# Patient Record
Sex: MALE | Race: WHITE | Employment: FULL TIME | URBAN - METROPOLITAN AREA
[De-identification: names, ages, dates, MRNs, and addresses within clinical notes are randomized per-mention and may not be internally consistent; named-entity substitution may affect disease eponyms.]

---

## 2017-03-13 ENCOUNTER — HOSPITAL ENCOUNTER (OUTPATIENT)
Dept: LAB | Age: 57
Discharge: HOME | End: 2017-03-13

## 2017-03-13 ENCOUNTER — TELEPHONE (OUTPATIENT)
Dept: INTERNAL MEDICINE CLINIC | Age: 57
End: 2017-03-13

## 2017-03-13 DIAGNOSIS — E66.3 OVERWEIGHT (BMI 25.0-29.9): ICD-10-CM

## 2017-03-13 DIAGNOSIS — E78.5 DYSLIPIDEMIA: Primary | ICD-10-CM

## 2017-03-13 LAB — SENTARA SPECIMEN COL,SENBCF: NORMAL

## 2017-03-13 PROCEDURE — 99001 SPECIMEN HANDLING PT-LAB: CPT | Performed by: INTERNAL MEDICINE

## 2017-03-13 NOTE — TELEPHONE ENCOUNTER
Pt is getting PE on 03/17-he only has a lipid in CC- does he need more labs done? Call PT when this has been  Done.  He goes to  for labs

## 2017-03-13 NOTE — TELEPHONE ENCOUNTER
Nurse,     Do we need to notify someone  that RD added additional labs today? Pt went off site and had them drawn.

## 2017-03-21 NOTE — PROGRESS NOTES
61 y.o. WHITE OR  male who presents for RPE    He seems to be doing well. No exercise although he does walk quite a bit with his work. No cardiovascular complaints    Denied Gi or  complaints. The sleep apnea is controlled and energy level is ok    The allergies are controlled at this time    Past Medical History:   Diagnosis Date    Allergic rhinitis     Dyslipidemia     calculated 10 year risk score was 6.7%(2/14); 7.7% (3/15); 7.9% (2/15)    Overweight(278.02)     Sleep apnea     Dr. Moustapha Longo [place] 1990s    right T10     Past Surgical History:   Procedure Laterality Date    CARDIAC SURG PROCEDURE UNLIST  11/08    echo nl lv, ef 55%    CARDIAC SURG PROCEDURE UNLIST  2007    thallium negative    HX COLONOSCOPY      Dr Curtis Castano 7/16/10 negative     Social History     Social History    Marital status:      Spouse name: N/A    Number of children: 2    Years of education: N/A     Occupational History    , storage mgr      Social History Main Topics    Smoking status: Never Smoker    Smokeless tobacco: Never Used    Alcohol use No    Drug use: No    Sexual activity: Not on file     Other Topics Concern    Not on file     Social History Narrative     Current Outpatient Prescriptions   Medication Sig    fluticasone (FLONASE) 50 mcg/actuation nasal spray One to two sprays each nostril daily     No current facility-administered medications for this visit.       Allergies   Allergen Reactions    Pcn [Penicillins] Shortness of Breath     REVIEW OF SYSTEMS: colo 2010 Dr Chandler [facility]  Ophtho - no vision change or eye pain  Oral - no mouth pain, tongue or tooth problems  Ears - no hearing loss, ear pain, fullness, no swallowing problems  Cardiac - no CP, PND, orthopnea, edema, palpitations or syncope  Chest - no breast masses  Resp - no wheezing, chronic coughing, dyspnea  GI - no heartburn, nausea, vomiting, change in bowel habits, bleeding, hemorrhoids  Urinary - no dysuria, hematuria, flank pain, urgency, frequency  Psych - denies any anxiety or depression symptoms, no hallucinations or violent ideation  Constitutional - no wt loss, night sweats, unexplained fevers  Neuro - no focal weakness, numbness, paresthesias, incoordination, ataxia, involuntary movements  Endo - no polyuria, polydipsia, nocturia, hot flashes    Visit Vitals    /80    Pulse 62    Temp 98.1 °F (36.7 °C) (Oral)    Ht 6' 0.25\" (1.835 m)    Wt 199 lb (90.3 kg)    SpO2 98%    BMI 26.8 kg/m2     Affect is appropriate. Mood stable  No apparent distress  HEENT --Anicteric sclerae, tympanic membranes normal,  ear canals normal.  PERRL, EOMI, conjunctiva and lids normal.  Disks were sharp  Sinuses were nontender, turbinates normal, hearing normal.  Oropharynx without  erythema, normal tongue, oral mucosa and tonsils. No thyromegaly, JVD, or bruits. Lungs --Clear to auscultation, normal percussion. Heart --Regular rate and rhythm, no murmurs, rubs, gallops, or clicks. Chest wall --Nontender to palpation. PMI normal.  Abdomen -- Soft and nontender, no hepatosplenomegaly or masses.   -- normal penis, no scrotal masses, testicular tenderness or hernias  Prostate  -- no asymmetry, nodularity, tenderness or enlargement  Rectal  -- normal tone, guiaiac negative brown stool  Extremities -- Without cyanosis, clubbing, edema. 2+ pulses equally and bilaterally.     LABS  From 5/10 showed   gluc 97, cr 0.90, gfr>60,          wbc 5.5, hb 12.9, plt 219  From 12/10 showed gluc 89, cr 0.90,    alt 52,         chol 207, tg 104, hdl 41, ldl-c 145, wbc 5.8, hb 13.3, plt 295, psa 0.20  From 12/11 showed gluc 83, cr 0.82, gfr 102, alt 26, ldl-p 2121, chol 196, tg 163, hdl 36, ldl-c 139,  wbc 4.6, hb 13.0, plt 267, psa 0.30, tsh 3.51  From 7/12 showed       ldl-p 1614, chol 180, tg 92,   hdl 35, ldl-c 127  From 12/12 showed       ldl-p 1671, chol 159, tg 67,   hdl 41, ldl-c 105  From 2/14 showed   gluc 90, cr 0.90, gfr>60, alt 38,         chol 188, tg 86,   hdl 37, ldl-c 134, wbc 4.9, hb 13.0, plt 337, psa 0.27, ua neg  From 2/15 showed   gluc 92, cr 0.90, gfr>60, alt 28,         chol 197, tg 127, hdl 38, ldl-c 133, wbc 5.3, hb 12.9, plt 293, psa 0.20, ua neg  From 2/16 showed   gluc 85, cr 0.70, gfr>60, alt 43,         chol 198, tg 121, hdl 39, ldl-c 135, wbc 5.5, hb 13.3, plt 305, psa 0.30, ua neg, ast 44, alk phos 71, hep c neg  From 3/17 showed              chol 175, tg 85,   hdl 41, ldl-c 117    Calculated 10 year risk score was 6.5%    Patient Active Problem List   Diagnosis Code    Sleep apnea Dr. Verena Woods G47.30    Dyslipidemia E78.5    Overweight (BMI 25.0-29. 9) E66.3     ASSESSMENT AND PLAN:  1. Dyslipidemia. Continue lifestyle and dietary modification  2. Sleep apnea. Continue CPAP and f/u Dr. Verena Woods   3. His forms were filled out        RTC 3/18    Above conditions discussed at length and patient vocalized understanding.   All questions answered to patient satifaction

## 2017-03-24 ENCOUNTER — OFFICE VISIT (OUTPATIENT)
Dept: INTERNAL MEDICINE CLINIC | Age: 57
End: 2017-03-24

## 2017-03-24 VITALS
OXYGEN SATURATION: 98 % | WEIGHT: 199 LBS | SYSTOLIC BLOOD PRESSURE: 124 MMHG | BODY MASS INDEX: 26.95 KG/M2 | TEMPERATURE: 98.1 F | HEART RATE: 62 BPM | HEIGHT: 72 IN | DIASTOLIC BLOOD PRESSURE: 80 MMHG

## 2017-03-24 DIAGNOSIS — Z00.00 PHYSICAL EXAM: Primary | ICD-10-CM

## 2017-03-24 DIAGNOSIS — E66.3 OVERWEIGHT (BMI 25.0-29.9): ICD-10-CM

## 2017-03-24 DIAGNOSIS — E78.5 DYSLIPIDEMIA: ICD-10-CM

## 2017-03-24 DIAGNOSIS — G47.33 OBSTRUCTIVE SLEEP APNEA SYNDROME: ICD-10-CM

## 2017-03-24 NOTE — MR AVS SNAPSHOT
Visit Information Date & Time Provider Department Dept. Phone Encounter #  
 3/24/2017  1:00 PM Keyonna Ramires MD [facility]ist of [address] [id number] Upcoming Health Maintenance Date Due FOBT Q 1 YEAR AGE 50-75 7/23/2009 DTaP/Tdap/Td series (1 - Tdap) 11/13/2009 INFLUENZA AGE 9 TO ADULT 8/1/2016 Allergies as of 3/24/2017  Review Complete On: 3/24/2017 By: Keyonna Ramires MD  
  
 Severity Noted Reaction Type Reactions Pcn [Penicillins]  01/05/2012    Shortness of Breath Current Immunizations  Reviewed on 2/21/2016 Name Date Hepatitis B Vaccine 1/1/2001 Influenza Vaccine 1/9/2013 Influenza Vaccine Split 1/5/2012 Influenza Vaccine Whole 11/25/2009 PPD 1/7/2013, 11/25/2009 TB Skin Test (PPD) Intradermal 3/16/2015 10:23 AM, 2/24/2015, 2/12/2014 TD Vaccine 11/12/2009, 1/1/2004 Not reviewed this visit Vitals BP Pulse Temp Height(growth percentile) Weight(growth percentile) SpO2  
 124/80 62 98.1 °F (36.7 °C) (Oral) 6' 0.25\" (1.835 m) 199 lb (90.3 kg) 98% BMI Smoking Status 26.8 kg/m2 Never Smoker Vitals History BMI and BSA Data Body Mass Index Body Surface Area  
 26.8 kg/m 2 2.15 m 2 Your Updated Medication List  
  
   
This list is accurate as of: 3/24/17  1:47 PM.  Always use your most recent med list.  
  
  
  
  
 fluticasone 50 mcg/actuation nasal spray Commonly known as:  Lennice Boss One to two sprays each nostril daily Introducing [facility]! [address] introduces [employer] patient portal. Now you can access parts of your medical record, email your doctor's office, and request medication refills online. 1. In your internet browser, go to [email or web address] 2. Click on the First Time User? Click Here link in the Sign In box. You will see the New Member Sign Up page. 3. Enter your [employer] Access Code exactly as it appears below. You will not need to use this code after youve completed the sign-up process. If you do not sign up before the expiration date, you must request a new code. · [employer] Access Code: [email or web address] Expires: 6/9/2017  8:32 AM 
 
4. Enter the last four digits of your Social Security Number (xxxx) and Date of Birth (mm/dd/yyyy) as indicated and click Submit. You will be taken to the next sign-up page. 5. Create a [employer] ID. This will be your [employer] login ID and cannot be changed, so think of one that is secure and easy to remember. 6. Create a [employer] password. You can change your password at any time. 7. Enter your Password Reset Question and Answer. This can be used at a later time if you forget your password. 8. Enter your e-mail address. You will receive e-mail notification when new information is available in [address] 9. Click Sign Up. You can now view and download portions of your medical record. 10. Click the Download Summary menu link to download a portable copy of your medical information. If you have questions, please visit the Frequently Asked Questions section of the [employer] website. Remember, [employer] is NOT to be used for urgent needs. For medical emergencies, dial 911. Now available from your iPhone and Android! Please provide this summary of care documentation to your next provider. Your primary care clinician is listed as Twan Hilton. If you have any questions after today's visit, please call [phone].

## 2017-03-24 NOTE — PROGRESS NOTES
1. Have you been to the ER, urgent care clinic or hospitalized since your last visit? NO.     2. Have you seen or consulted any other health care providers outside of the [address] [place] [address] since your last visit (Include any pap smears or colon screening)? NO      Do you have an Advanced Directive? NO    Would you like information on Advanced Directives?  YES

## 2017-03-27 ENCOUNTER — DOCUMENTATION ONLY (OUTPATIENT)
Dept: INTERNAL MEDICINE CLINIC | Age: 57
End: 2017-03-27

## 2017-09-21 DIAGNOSIS — Z00.00 PHYSICAL EXAM: ICD-10-CM

## 2018-01-26 ENCOUNTER — TELEPHONE (OUTPATIENT)
Dept: INTERNAL MEDICINE CLINIC | Age: 58
End: 2018-01-26

## 2018-01-26 DIAGNOSIS — Z00.00 ROUTINE GENERAL MEDICAL EXAMINATION AT A HEALTH CARE FACILITY: ICD-10-CM

## 2018-01-26 DIAGNOSIS — E78.5 DYSLIPIDEMIA: ICD-10-CM

## 2018-01-26 DIAGNOSIS — Z00.00 ROUTINE GENERAL MEDICAL EXAMINATION AT A HEALTH CARE FACILITY: Primary | ICD-10-CM

## 2018-03-27 ENCOUNTER — HOSPITAL ENCOUNTER (OUTPATIENT)
Dept: LAB | Age: 58
Discharge: HOME | End: 2018-03-27

## 2018-03-27 LAB
A-G RATIO,AGRAT: 1.8 RATIO (ref 1.1–2.6)
ABSOLUTE LYMPHOCYTE COUNT, 10803: 1.5 K/UL (ref 1–4.8)
ALBUMIN SERPL-MCNC: 4.5 G/DL (ref 3.5–5)
ALP SERPL-CCNC: 64 U/L (ref 25–115)
ALT SERPL-CCNC: 34 U/L (ref 5–40)
ANION GAP SERPL CALC-SCNC: 15 MMOL/L
AST SERPL W P-5'-P-CCNC: 36 U/L (ref 10–37)
BASOPHILS # BLD: 0.1 K/UL (ref 0–0.2)
BASOPHILS NFR BLD: 2 % (ref 0–2)
BILIRUB SERPL-MCNC: 0.4 MG/DL (ref 0.2–1.2)
BUN SERPL-MCNC: 13 MG/DL (ref 6–22)
CALCIUM SERPL-MCNC: 9.6 MG/DL (ref 8.4–10.4)
CHLORIDE SERPL-SCNC: 103 MMOL/L (ref 98–110)
CHOLEST SERPL-MCNC: 202 MG/DL (ref 110–200)
CO2 SERPL-SCNC: 26 MMOL/L (ref 20–32)
CREAT SERPL-MCNC: 0.8 MG/DL (ref 0.5–1.2)
EOSINOPHIL # BLD: 0.3 K/UL (ref 0–0.5)
EOSINOPHIL NFR BLD: 5 % (ref 0–6)
ERYTHROCYTE [DISTWIDTH] IN BLOOD BY AUTOMATED COUNT: 12.8 % (ref 10–15.5)
GFRAA, 66117: >60
GFRNA, 66118: >60
GLOBULIN,GLOB: 2.5 G/DL (ref 2–4)
GLUCOSE SERPL-MCNC: 93 MG/DL (ref 70–99)
GRANULOCYTES,GRANS: 54 % (ref 40–75)
HCT VFR BLD AUTO: 38.9 % (ref 39.3–51.6)
HDLC SERPL-MCNC: 37 MG/DL (ref 40–59)
HDLC SERPL-MCNC: 5.5 MG/DL (ref 0–5)
HGB BLD-MCNC: 12.7 G/DL (ref 13.1–17.2)
LDLC SERPL CALC-MCNC: 143 MG/DL (ref 50–99)
LYMPHOCYTES, LYMLT: 28 % (ref 20–45)
MCH RBC QN AUTO: 31 PG (ref 26–34)
MCHC RBC AUTO-ENTMCNC: 33 G/DL (ref 31–36)
MCV RBC AUTO: 95 FL (ref 80–95)
MONOCYTES # BLD: 0.6 K/UL (ref 0.1–1)
MONOCYTES NFR BLD: 11 % (ref 3–12)
NEUTROPHILS # BLD AUTO: 2.9 K/UL (ref 1.8–7.7)
PLATELET # BLD AUTO: 290 K/UL (ref 140–440)
PMV BLD AUTO: 10.8 FL (ref 9–13)
POTASSIUM SERPL-SCNC: 4.5 MMOL/L (ref 3.5–5.5)
PROT SERPL-MCNC: 7 G/DL (ref 6.4–8.3)
RBC # BLD AUTO: 4.09 M/UL (ref 3.8–5.8)
SENTARA SPECIMEN COL,SENBCF: NORMAL
SODIUM SERPL-SCNC: 144 MMOL/L (ref 133–145)
TRIGL SERPL-MCNC: 113 MG/DL (ref 40–149)
TSH SERPL DL<=0.005 MIU/L-ACNC: 3 MCU/ML (ref 0.27–4.2)
VLDLC SERPL CALC-MCNC: 23 MG/DL (ref 8–30)
WBC # BLD AUTO: 5.3 K/UL (ref 4–11)

## 2018-03-27 PROCEDURE — 99001 SPECIMEN HANDLING PT-LAB: CPT | Performed by: INTERNAL MEDICINE

## 2018-03-29 NOTE — PROGRESS NOTES
61 y.o. WHITE OR  male who presents for RPE    No exercise outside of walking for his work. No cardiovascular complaints    Denied Gi or  complaints. The sleep apnea is controlled and energy level is ok    The allergies are controlled at this time    Past Medical History:   Diagnosis Date    Allergic rhinitis     Dyslipidemia     calculated 10 year risk score was 6.7%(2/14); 7.7% (3/15); 7.9% (2/15); 6.3% (3/17); 9.3% (4/18)    Overweight(278.02)     Sleep apnea     Dr. Ericka Wan Zoster 1990s    right T10     Past Surgical History:   Procedure Laterality Date    CARDIAC SURG PROCEDURE UNLIST  11/08    echo nl lv, ef 55%    CARDIAC SURG PROCEDURE UNLIST  2007    thallium negative    HX COLONOSCOPY      Dr Alisson Abernathy 7/16/10 negative     Social History     Social History    Marital status:      Spouse name: N/A    Number of children: 2    Years of education: N/A     Occupational History          Social History Main Topics    Smoking status: Never Smoker    Smokeless tobacco: Never Used    Alcohol use No    Drug use: No    Sexual activity: Not on file     Other Topics Concern    Not on file     Social History Narrative     Current Outpatient Prescriptions   Medication Sig    fluticasone (FLONASE) 50 mcg/actuation nasal spray One to two sprays each nostril daily     No current facility-administered medications for this visit.       Allergies   Allergen Reactions    Pcn [Penicillins] Shortness of Breath     REVIEW OF SYSTEMS: colo 7/10 Dr Alonzo Cockayne  Ophtho  no vision change or eye pain  Oral  no mouth pain, tongue or tooth problems  Ears  no hearing loss, ear pain, fullness, no swallowing problems  Cardiac  no CP, PND, orthopnea, edema, palpitations or syncope  Chest  no breast masses  Resp  no wheezing, chronic coughing, dyspnea  GI  no heartburn, nausea, vomiting, change in bowel habits, bleeding, hemorrhoids  Urinary  no dysuria, hematuria, flank pain, urgency, frequency  Psych  denies any anxiety or depression symptoms, no hallucinations or violent ideation  Constitutional  no wt loss, night sweats, unexplained fevers  Neuro  no focal weakness, numbness, paresthesias, incoordination, ataxia, involuntary movements  Endo - no polyuria, polydipsia, nocturia, hot flashes    Visit Vitals    /84    Pulse 65    Temp 98.1 °F (36.7 °C) (Oral)    Resp 14    Ht 6' 0.25\" (1.835 m)    Wt 207 lb (93.9 kg)    SpO2 98%    BMI 27.88 kg/m2     Affect is appropriate. Mood stable  No apparent distress  HEENT --Anicteric sclerae, tympanic membranes normal,  ear canals normal.  PERRL, EOMI, conjunctiva and lids normal.  Disks were sharp  Sinuses were nontender, turbinates normal, hearing normal.  Oropharynx without  erythema, normal tongue, oral mucosa and tonsils. No thyromegaly, JVD, or bruits. Lungs --Clear to auscultation, normal percussion. Heart --Regular rate and rhythm, no murmurs, rubs, gallops, or clicks. Chest wall --Nontender to palpation. PMI normal.  Abdomen -- Soft and nontender, no hepatosplenomegaly or masses.   -- normal penis, no scrotal masses, testicular tenderness or hernias  Prostate  -- no asymmetry, nodularity, tenderness or enlargement  Rectal  -- normal tone, guiaiac negative brown stool  Extremities -- Without cyanosis, clubbing, edema. 2+ pulses equally and bilaterally.     LABS  From 5/10 showed   gluc 97, cr 0.90, gfr>60,           wbc 5.5, hb 12.9, plt 219  From 12/10 showed gluc 89, cr 0.90,    alt 52,         chol 207, tg 104, hdl 41, ldl-c 145, wbc 5.8, hb 13.3, plt 295, psa 0.20  From 12/11 showed gluc 83, cr 0.82, gfr 102, alt 26, ldl-p 2121, chol 196, tg 163, hdl 36, ldl-c 139,  wbc 4.6, hb 13.0, plt 267, psa 0.30, tsh 3.51  From 7/12 showed       ldl-p 1614, chol 180, tg 92,   hdl 35, ldl-c 127  From 12/12 showed       ldl-p 1671, chol 159, tg 67,   hdl 41, ldl-c 105  From 2/14 showed   gluc 90, cr 0.90, gfr>60, alt 38, chol 188, tg 86,   hdl 37, ldl-c 134, wbc 4.9, hb 13.0, plt 337, psa 0.27, ua neg  From 2/15 showed   gluc 92, cr 0.90, gfr>60, alt 28,         chol 197, tg 127, hdl 38, ldl-c 133, wbc 5.3, hb 12.9, plt 293, psa 0.20, ua neg  From 2/16 showed   gluc 85, cr 0.70, gfr>60, alt 43,         chol 198, tg 121, hdl 39, ldl-c 135, wbc 5.5, hb 13.3, plt 305, psa 0.30, ua neg, ast 44, alk phos 71, hep c neg  From 3/17 showed              chol 175, tg 85,   hdl 41, ldl-c 117    Results for orders placed or performed in visit on 03/27/18   LIPID PANEL   Result Value Ref Range    Triglyceride 113 40 - 149 mg/dL    HDL Cholesterol 37 (L) 40 - 59 mg/dL    Cholesterol, total 202 (H) 110 - 200 mg/dL    CHOLESTEROL/HDL 5.5 0.0 - 5.0    LDL, calculated 143 (H) 50 - 99 mg/dL    VLDL, calculated 23 8 - 30 mg/dL   METABOLIC PANEL, COMPREHENSIVE   Result Value Ref Range    Glucose 93 70 - 99 mg/dL    BUN 13 6 - 22 mg/dL    Creatinine 0.8 0.5 - 1.2 mg/dL    Sodium 144 133 - 145 mmol/L    Potassium 4.5 3.5 - 5.5 mmol/L    Chloride 103 98 - 110 mmol/L    CO2 26 20 - 32 mmol/L    AST (SGOT) 36 10 - 37 U/L    ALT (SGPT) 34 5 - 40 U/L    Alk.  phosphatase 64 25 - 115 U/L    Bilirubin, total 0.4 0.2 - 1.2 mg/dL    Calcium 9.6 8.4 - 10.4 mg/dL    Protein, total 7.0 6.4 - 8.3 g/dL    Albumin 4.5 3.5 - 5.0 g/dL    A-G Ratio 1.8 1.1 - 2.6 ratio    Globulin 2.5 2.0 - 4.0 g/dL    Anion gap 15.0 mmol/L    GFRAA >60.0 >60.0    GFRNA >60.0 >60.0   TSH 3RD GENERATION   Result Value Ref Range    TSH 3.00 0.27 - 4.20 mcU/mL   CBC WITH AUTOMATED DIFF   Result Value Ref Range    WBC 5.3 4.0 - 11.0 K/uL    RBC 4.09 3.80 - 5.80 M/uL    HGB 12.7 (L) 13.1 - 17.2 g/dL    HCT 38.9 (L) 39.3 - 51.6 %    MCV 95 80 - 95 fL    MCH 31 26 - 34 pg    MCHC 33 31 - 36 g/dL    RDW 12.8 10.0 - 15.5 %    PLATELET 785 549 - 194 K/uL    MPV 10.8 9.0 - 13.0 fL    NEUTROPHILS 54 40 - 75 %    Lymphocytes 28 20 - 45 %    MONOCYTES 11 3 - 12 %    EOSINOPHILS 5 0 - 6 %    BASOPHILS 2 0 - 2 %    ABS. NEUTROPHILS 2.9 1.8 - 7.7 K/uL    ABSOLUTE LYMPHOCYTE COUNT 1.5 1.0 - 4.8 K/uL    ABS. MONOCYTES 0.6 0.1 - 1.0 K/uL    ABS. EOSINOPHILS 0.3 0.0 - 0.5 K/uL    ABS. BASOPHILS 0.1 0.0 - 0.2 K/uL     Calculated 10 year risk score was 9.3%    Patient Active Problem List   Diagnosis Code    Sleep apnea Dr. Katty Drake G47.30    Dyslipidemia E78.5    Overweight (BMI 25.0-29. 9) E66.3     ASSESSMENT AND PLAN:  1. Dyslipidemia. Long discussion. Declined statin or further risk stratification w ca score. Risks and benefits of statins discussed at length, call if he changes his mind  2. Sleep apnea. Continue CPAP and f/u Dr. Katty Drake   3. His forms were filled out  4. Mild anemia. Declined eval for now but will recheck in 6 mos and go from there        RTC 10/18    Above conditions discussed at length and patient vocalized understanding.   All questions answered to patient satifaction

## 2018-04-02 ENCOUNTER — OFFICE VISIT (OUTPATIENT)
Dept: INTERNAL MEDICINE CLINIC | Age: 58
End: 2018-04-02

## 2018-04-02 VITALS
OXYGEN SATURATION: 98 % | SYSTOLIC BLOOD PRESSURE: 126 MMHG | DIASTOLIC BLOOD PRESSURE: 84 MMHG | RESPIRATION RATE: 14 BRPM | HEART RATE: 65 BPM | WEIGHT: 207 LBS | HEIGHT: 72 IN | BODY MASS INDEX: 28.04 KG/M2 | TEMPERATURE: 98.1 F

## 2018-04-02 DIAGNOSIS — D64.9 ANEMIA, UNSPECIFIED TYPE: ICD-10-CM

## 2018-04-02 DIAGNOSIS — G47.33 OBSTRUCTIVE SLEEP APNEA SYNDROME: ICD-10-CM

## 2018-04-02 DIAGNOSIS — E78.5 DYSLIPIDEMIA: ICD-10-CM

## 2018-04-02 DIAGNOSIS — Z00.00 PHYSICAL EXAM: Primary | ICD-10-CM

## 2018-04-02 DIAGNOSIS — E66.3 OVERWEIGHT (BMI 25.0-29.9): ICD-10-CM

## 2018-04-02 NOTE — PROGRESS NOTES
1. Have you been to the ER, urgent care clinic or hospitalized since your last visit? NO.     2. Have you seen or consulted any other health care providers outside of the [address] [place] [address] since your last visit (Include any pap smears or colon screening)? NO      Do you have an Advanced Directive? NO    Would you like information on Advanced Directives?  NO

## 2018-04-03 ENCOUNTER — TELEPHONE (OUTPATIENT)
Dept: INTERNAL MEDICINE CLINIC | Age: 58
End: 2018-04-03

## 2018-09-30 DIAGNOSIS — D64.9 ANEMIA, UNSPECIFIED TYPE: ICD-10-CM

## 2019-04-08 ENCOUNTER — HOSPITAL ENCOUNTER (OUTPATIENT)
Dept: LAB | Age: 59
Discharge: HOME | End: 2019-04-08

## 2019-04-08 PROCEDURE — 99001 SPECIMEN HANDLING PT-LAB: CPT

## 2019-04-09 LAB
ERYTHROCYTE [DISTWIDTH] IN BLOOD BY AUTOMATED COUNT: 13 % (ref 10–15.5)
HCT VFR BLD AUTO: 40.1 % (ref 39.3–51.6)
HGB BLD-MCNC: 12.4 G/DL (ref 13.1–17.2)
MCH RBC QN AUTO: 31 PG (ref 26–34)
MCHC RBC AUTO-ENTMCNC: 31 G/DL (ref 31–36)
MCV RBC AUTO: 100 FL (ref 80–95)
PLATELET # BLD AUTO: 260 K/UL (ref 140–440)
PMV BLD AUTO: 11.1 FL (ref 9–13)
PSA SERPL-MCNC: 0.19 NG/ML
RBC # BLD AUTO: 4.02 M/UL (ref 3.8–5.8)
WBC # BLD AUTO: 4.6 K/UL (ref 4–11)

## 2019-04-15 NOTE — PROGRESS NOTES
60 y.o. WHITE OR  male who presents for RPE No exercise outside of walking for his work. Walks the grandsons a few times a week also  No cardiovascular complaints Denied Gi or  complaints. Specifically, no bleeding episodes The sleep apnea is controlled and energy level is good The allergies are controlled at this time Past Medical History:  
Diagnosis Date  Allergic rhinitis  Dyslipidemia   
 calculated 10 year risk score was 6.7%(2/14); 7.7% (3/15); 7.9% (2/15); 6.3% (3/17); 9.3% (4/18)  Overweight(278.02)  Sleep apnea Dr. Esquivel Mac  Zoster 1990s  
 right T10 Past Surgical History:  
Procedure Laterality Date  CARDIAC SURG PROCEDURE UNLIST  11/08  
 echo nl lv, ef 55%  CARDIAC SURG PROCEDURE UNLIST  2007  
 thallium negative  HX COLONOSCOPY Dr Jozef Hameed 7/16/10 negative Social History Socioeconomic History  Marital status:  Spouse name: Not on file  Number of children: 2  
 Years of education: Not on file  Highest education level: Not on file Occupational History  Occupation:  Social Needs  Financial resource strain: Not on file  Food insecurity:  
  Worry: Not on file Inability: Not on file  Transportation needs:  
  Medical: Not on file Non-medical: Not on file Tobacco Use  Smoking status: Never Smoker  Smokeless tobacco: Never Used Substance and Sexual Activity  Alcohol use: No  
 Drug use: No  
 Sexual activity: Not on file Lifestyle  Physical activity:  
  Days per week: Not on file Minutes per session: Not on file  Stress: Not on file Relationships  Social connections:  
  Talks on phone: Not on file Gets together: Not on file Attends Buddhist service: Not on file Active member of club or organization: Not on file Attends meetings of clubs or organizations: Not on file Relationship status: Not on file  Intimate partner violence: Fear of current or ex partner: Not on file Emotionally abused: Not on file Physically abused: Not on file Forced sexual activity: Not on file Other Topics Concern  Not on file Social History Narrative  Not on file Current Outpatient Medications Medication Sig  
 fluticasone (FLONASE) 50 mcg/actuation nasal spray One to two sprays each nostril daily No current facility-administered medications for this visit. Allergies Allergen Reactions  Pcn [Penicillins] Shortness of Breath REVIEW OF SYSTEMS: colo 7/10 Dr Chandrakant Nicolas Ophtho  no vision change or eye pain Oral  no mouth pain, tongue or tooth problems Ears  no hearing loss, ear pain, fullness, no swallowing problems Cardiac  no CP, PND, orthopnea, edema, palpitations or syncope Chest  no breast masses Resp  no wheezing, chronic coughing, dyspnea GI  no heartburn, nausea, vomiting, change in bowel habits, bleeding, hemorrhoids Urinary  no dysuria, hematuria, flank pain, urgency, frequency Psych  denies any anxiety or depression symptoms, no hallucinations or violent ideation Constitutional  no wt loss, night sweats, unexplained fevers Neuro  no focal weakness, numbness, paresthesias, incoordination, ataxia, involuntary movements Endo - no polyuria, polydipsia, nocturia, hot flashes Visit Vitals /85 Pulse 73 Temp 98.2 °F (36.8 °C) (Oral) Resp 14 Ht 6' 0.25\" (1.835 m) Wt 201 lb (91.2 kg) SpO2 99% BMI 27.07 kg/m² Affect is appropriate. Mood stable No apparent distress HEENT --Anicteric sclerae, tympanic membranes normal,  ear canals normal. 
PERRL, EOMI, conjunctiva and lids normal.  Disks were sharp Sinuses were nontender, turbinates normal, hearing normal.  Oropharynx without 
erythema, normal tongue, oral mucosa and tonsils. No thyromegaly, JVD, or bruits. Lungs --Clear to auscultation, normal percussion. Heart --Regular rate and rhythm, no murmurs, rubs, gallops, or clicks. Chest wall --Nontender to palpation. PMI normal. 
Abdomen -- Soft and nontender, no hepatosplenomegaly or masses.   -- normal penis, no scrotal masses, testicular tenderness or hernias Prostate  -- no asymmetry, nodularity, tenderness or enlargement Rectal  -- normal tone, guiaiac negative brown stool Extremities -- Without cyanosis, clubbing, edema. 2+ pulses equally and bilaterally. LABS From 5/10 showed   gluc 97, cr 0.90, gfr>60,           wbc 5.5, hb 12.9, plt 219 From 12/10 showed gluc 89, cr 0.90,    alt 52,         chol 207, tg 104, hdl 41, ldl-c 145, wbc 5.8, hb 13.3, plt 295, psa 0.20 From 12/11 showed gluc 83, cr 0.82, gfr 102, alt 26, ldl-p 2121, chol 196, tg 163, hdl 36, ldl-c 139,  wbc 4.6, hb 13.0, plt 267, psa 0.30, tsh 3.51 From 7/12 showed       ldl-p 1614, chol 180, tg 92,   hdl 35, ldl-c 127 From 12/12 showed       ldl-p 1671, chol 159, tg 67,   hdl 41, ldl-c 105 From 2/14 showed   gluc 90, cr 0.90, gfr>60, alt 38,         chol 188, tg 86,   hdl 37, ldl-c 134, wbc 4.9, hb 13.0, plt 337, psa 0.27, ua neg From 2/15 showed   gluc 92, cr 0.90, gfr>60, alt 28,         chol 197, tg 127, hdl 38, ldl-c 133, wbc 5.3, hb 12.9, plt 293, psa 0.20, ua neg From 2/16 showed   gluc 85, cr 0.70, gfr>60, alt 43,         chol 198, tg 121, hdl 39, ldl-c 135, wbc 5.5, hb 13.3, plt 305, psa 0.30, ua neg, ast 44, alk phos 71, hep c neg From 3/17 showed              chol 175, tg 85,   hdl 41, ldl-c 117 From 4/18 showed   gluc 93, cr 0.80, gfr>60, alt 34,         chol 202, tg 113, hdl 37, ldl-c 143, wbc 5.3, hb 12.7, plt 290, tsh 3.00 From 4/19 showed               wbc 4.4, hb 12.4, plt 268, psa 0.19 Results for orders placed or performed in visit on 04/16/19 AMB POC FECAL BLOOD, OCCULT, QL 1 CARD Result Value Ref Range VALID INTERNAL CONTROL POC Yes Hemoccult (POC) Negative Negative Patient Active Problem List  
Diagnosis Code  Sleep apnea Dr. Hilary Grider G47.30  Dyslipidemia E78.5  Overweight (BMI 25.0-29. 9) E66.3 ASSESSMENT AND PLAN: 
1. Dyslipidemia. Check labs, previously declined statin 2. Sleep apnea. Continue CPAP and f/u Dr. Hilary Grider  
3. His forms were filled out 4. Mild anemia. Proceed with workup RTC 4/20 Above conditions discussed at length and patient vocalized understanding. All questions answered to patient satisfaction

## 2019-04-16 ENCOUNTER — OFFICE VISIT (OUTPATIENT)
Dept: INTERNAL MEDICINE CLINIC | Age: 59
End: 2019-04-16

## 2019-04-16 VITALS
OXYGEN SATURATION: 99 % | DIASTOLIC BLOOD PRESSURE: 85 MMHG | WEIGHT: 201 LBS | RESPIRATION RATE: 14 BRPM | HEIGHT: 72 IN | SYSTOLIC BLOOD PRESSURE: 139 MMHG | HEART RATE: 73 BPM | BODY MASS INDEX: 27.22 KG/M2 | TEMPERATURE: 98.2 F

## 2019-04-16 DIAGNOSIS — D64.9 ANEMIA, UNSPECIFIED TYPE: ICD-10-CM

## 2019-04-16 DIAGNOSIS — D64.9 ANEMIA, UNSPECIFIED TYPE: Primary | ICD-10-CM

## 2019-04-16 DIAGNOSIS — Z00.00 PHYSICAL EXAM: ICD-10-CM

## 2019-04-16 LAB
HEMOCCULT STL QL: NEGATIVE
VALID INTERNAL CONTROL?: YES

## 2019-04-16 NOTE — PROGRESS NOTES
Chief Complaint Patient presents with  Physical  
  labs 1. Have you been to the ER, urgent care clinic or hospitalized since your last visit? NO.  
 
2. Have you seen or consulted any other health care providers outside of the [address] [place] [address] since your last visit (Include any pap smears or colon screening)?  NO

## 2019-04-17 ENCOUNTER — HOSPITAL ENCOUNTER (OUTPATIENT)
Dept: LAB | Age: 59
Discharge: HOME | End: 2019-04-17

## 2019-04-17 ENCOUNTER — TELEPHONE (OUTPATIENT)
Dept: INTERNAL MEDICINE CLINIC | Age: 59
End: 2019-04-17

## 2019-04-17 LAB
A-G RATIO,AGRAT: 1.6 RATIO (ref 1.1–2.6)
ALBUMIN SERPL-MCNC: 4.5 G/DL (ref 3.5–5)
ALP SERPL-CCNC: 74 U/L (ref 25–115)
ALT SERPL-CCNC: 30 U/L (ref 5–40)
ANION GAP SERPL CALC-SCNC: 13 MMOL/L
AST SERPL W P-5'-P-CCNC: 31 U/L (ref 10–37)
BILIRUB SERPL-MCNC: 0.4 MG/DL (ref 0.2–1.2)
BILIRUB UR QL: NEGATIVE
BUN SERPL-MCNC: 17 MG/DL (ref 6–22)
CALCIUM SERPL-MCNC: 9.7 MG/DL (ref 8.4–10.4)
CHLORIDE SERPL-SCNC: 104 MMOL/L (ref 98–110)
CHOLEST SERPL-MCNC: 190 MG/DL (ref 110–200)
CO2 SERPL-SCNC: 26 MMOL/L (ref 20–32)
CREAT SERPL-MCNC: 0.9 MG/DL (ref 0.5–1.2)
FOLATE,FOL: 13.37 NG/ML
GFRAA, 66117: >60
GFRNA, 66118: >60
GLOBULIN,GLOB: 2.8 G/DL (ref 2–4)
GLUCOSE SERPL-MCNC: 93 MG/DL (ref 70–99)
GLUCOSE UR QL: NEGATIVE MG/DL
HDLC SERPL-MCNC: 38 MG/DL (ref 40–59)
HDLC SERPL-MCNC: 5 MG/DL (ref 0–5)
HGB UR QL STRIP: ABNORMAL
KETONES UR QL STRIP.AUTO: NEGATIVE MG/DL
LDLC SERPL CALC-MCNC: 130 MG/DL (ref 50–99)
LEUKOCYTE ESTERASE: NEGATIVE
NITRITE UR QL STRIP.AUTO: NEGATIVE
PH UR STRIP: 5 PH (ref 5–8)
POTASSIUM SERPL-SCNC: 4.1 MMOL/L (ref 3.5–5.5)
PROT SERPL-MCNC: 7.3 G/DL (ref 6.4–8.3)
PROT UR QL STRIP: NEGATIVE MG/DL
RBC #/AREA URNS HPF: ABNORMAL /HPF
RETIC COUNT, AUTOMATED,RETIC: 0.6 % (ref 0.5–2)
SENTARA SPECIMEN COL,SENBCF: NORMAL
SODIUM SERPL-SCNC: 143 MMOL/L (ref 133–145)
SP GR UR: 1.02 (ref 1–1.03)
TRIGL SERPL-MCNC: 111 MG/DL (ref 40–149)
UROBILINOGEN UR STRIP-MCNC: <2 MG/DL
VIT B12 SERPL-MCNC: 468 PG/ML (ref 211–911)
VLDLC SERPL CALC-MCNC: 22 MG/DL (ref 8–30)
WBC URNS QL MICRO: NEGATIVE /HPF (ref 0–2)

## 2019-04-17 PROCEDURE — 99001 SPECIMEN HANDLING PT-LAB: CPT

## 2019-04-18 NOTE — TELEPHONE ENCOUNTER
pls call    Cmp, ua, cmp, b12/fol ok  Chol shows improved lipids but still qualifying for statin - is he interested in lipitor?

## 2019-04-18 NOTE — TELEPHONE ENCOUNTER
Pt aware of message below and verbalized understanding. No further questions or concerns from pt at this time. Pt is not interested in a statin at this time.

## 2019-04-19 ENCOUNTER — TELEPHONE (OUTPATIENT)
Dept: INTERNAL MEDICINE CLINIC | Age: 59
End: 2019-04-19

## 2019-04-19 LAB
DISCLAIMER, 150294: NORMAL
METHYLMALONIC ACID: 137 NMOL/L (ref 0–378)

## 2019-04-19 NOTE — TELEPHONE ENCOUNTER
Pt notified form is at  to  for  application. Needs to come into office to do eye test with nurse. He will call back on Monday to schedule. Form is in medical records.

## 2019-04-21 LAB
ALBUMIN, 001488: 53.5 % (ref 46.6–62.6)
ALPHA-1-GLOBULIN, 001489: 1.6 % (ref 1.7–4.1)
ALPHA-2-GLOBULIN, 001490: 11.8 % (ref 5.9–13.5)
BETA GLOBULIN, 001491: 16.4 % (ref 10.9–18.9)
GAMMA GLOBULIN, 001492: 16.8 % (ref 11.6–24.4)
PE INTERPRETATION, 107352: ABNORMAL
PROT SERPL-MCNC: 7 G/DL (ref 6.4–8.3)

## 2019-04-29 ENCOUNTER — CLINICAL SUPPORT (OUTPATIENT)
Dept: INTERNAL MEDICINE CLINIC | Age: 59
End: 2019-04-29

## 2019-04-29 DIAGNOSIS — Z01.00 ENCOUNTER FOR EYE EXAM: Primary | ICD-10-CM

## 2019-10-02 ENCOUNTER — TELEPHONE (OUTPATIENT)
Dept: INTERNAL MEDICINE CLINIC | Age: 59
End: 2019-10-02

## 2019-10-02 NOTE — TELEPHONE ENCOUNTER
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 I questioned him about this request, usually not required for vacation days, but he states City requires it for 3 or more days off for any reason. He states he can say for stress relief or whatever. Call pt at [phone] if RD can do letter and when it is ready for pick-up.

## 2019-10-04 ENCOUNTER — DOCUMENTATION ONLY (OUTPATIENT)
Dept: INTERNAL MEDICINE CLINIC | Age: 59
End: 2019-10-04

## 2020-04-02 DIAGNOSIS — Z00.00 PHYSICAL EXAM: Primary | ICD-10-CM

## 2020-07-17 ENCOUNTER — HOSPITAL ENCOUNTER (OUTPATIENT)
Dept: LAB | Age: 60
Discharge: HOME | End: 2020-07-17

## 2020-07-17 LAB
A-G RATIO,AGRAT: 1.7 RATIO (ref 1.1–2.6)
ALBUMIN SERPL-MCNC: 4.4 G/DL (ref 3.5–5)
ALP SERPL-CCNC: 71 U/L (ref 40–125)
ALT SERPL-CCNC: 30 U/L (ref 5–40)
ANION GAP SERPL CALC-SCNC: 13 MMOL/L (ref 3–15)
AST SERPL W P-5'-P-CCNC: 31 U/L (ref 10–37)
BILIRUB SERPL-MCNC: 0.3 MG/DL (ref 0.2–1.2)
BUN SERPL-MCNC: 16 MG/DL (ref 6–22)
CALCIUM SERPL-MCNC: 9.7 MG/DL (ref 8.4–10.5)
CHLORIDE SERPL-SCNC: 106 MMOL/L (ref 98–110)
CHOLEST SERPL-MCNC: 200 MG/DL (ref 110–200)
CO2 SERPL-SCNC: 23 MMOL/L (ref 20–32)
CREAT SERPL-MCNC: 0.8 MG/DL (ref 0.8–1.6)
ERYTHROCYTE [DISTWIDTH] IN BLOOD BY AUTOMATED COUNT: 12.6 % (ref 10–15.5)
GFRAA, 66117: >60
GFRNA, 66118: >60
GLOBULIN,GLOB: 2.6 G/DL (ref 2–4)
GLUCOSE SERPL-MCNC: 93 MG/DL (ref 70–99)
HCT VFR BLD AUTO: 41.2 % (ref 39.3–51.6)
HDLC SERPL-MCNC: 33 MG/DL
HDLC SERPL-MCNC: 6.1 MG/DL (ref 0–5)
HGB BLD-MCNC: 12.9 G/DL (ref 13.1–17.2)
LDL/HDL RATIO,LDHD: 4.4
LDLC SERPL CALC-MCNC: 148 MG/DL (ref 50–99)
MCH RBC QN AUTO: 31 PG (ref 26–34)
MCHC RBC AUTO-ENTMCNC: 31 G/DL (ref 31–36)
MCV RBC AUTO: 98 FL (ref 80–95)
NON-HDL CHOLESTEROL, 011976: 167 MG/DL
PLATELET # BLD AUTO: 250 K/UL (ref 140–440)
PMV BLD AUTO: 11.1 FL (ref 9–13)
POTASSIUM SERPL-SCNC: 4.8 MMOL/L (ref 3.5–5.5)
PROT SERPL-MCNC: 7 G/DL (ref 6.2–8.1)
PSA SERPL-MCNC: 0.2 NG/ML
RBC # BLD AUTO: 4.2 M/UL (ref 3.8–5.8)
SENTARA SPECIMEN COL,SENBCF: NORMAL
SODIUM SERPL-SCNC: 142 MMOL/L (ref 133–145)
TRIGL SERPL-MCNC: 95 MG/DL (ref 40–149)
VLDLC SERPL CALC-MCNC: 19 MG/DL (ref 8–30)
WBC # BLD AUTO: 5.3 K/UL (ref 4–11)

## 2020-07-17 PROCEDURE — 99001 SPECIMEN HANDLING PT-LAB: CPT

## 2020-07-19 NOTE — PROGRESS NOTES
63 y.o. WHITE OR  male who presents for RPE    No exercise outside of walking for his work. They watch the grandkids several days of the week also    Denied Gi or  complaints.      The sleep apnea is controlled and energy level is good    The allergies are controlled at this time    Past Medical History:   Diagnosis Date    Allergic rhinitis     Dyslipidemia     calculated 10 year risk score was 6.7%(2/14); 7.7% (3/15); 7.9% (2/15); 6.3% (3/17); 9.3% (4/18); declined statin repeatedly; declined ca score    KAVYA (obstructive sleep apnea)     Dr. Margie Feliciano Overweight(278.02)     Zoster 1990s    right T10     Past Surgical History:   Procedure Laterality Date    CARDIAC SURG PROCEDURE UNLIST  11/08    echo nl lv, ef 55%    CARDIAC SURG PROCEDURE UNLIST  2007    thallium negative    HX COLONOSCOPY      Dr Sherren Grade 7/16/10 negative     Social History     Socioeconomic History    Marital status:      Spouse name: Not on file    Number of children: 2    Years of education: Not on file    Highest education level: Not on file   Occupational History    Occupation:    Social Needs    Financial resource strain: Not on file    Food insecurity     Worry: Not on file     Inability: Not on file   [employer] needs     Medical: Not on file     Non-medical: Not on file   Tobacco Use    Smoking status: Never Smoker    Smokeless tobacco: Never Used   Substance and Sexual Activity    Alcohol use: No    Drug use: No    Sexual activity: Not on file   Lifestyle    Physical activity     Days per week: Not on file     Minutes per session: Not on file    Stress: Not on file   Relationships    Social connections     Talks on phone: Not on file     Gets together: Not on file     Attends [employer] service: Not on file     Active member of club or organization: Not on file     Attends meetings of clubs or organizations: Not on file     Relationship status: Not on file    Intimate partner violence     Fear of current or ex partner: Not on file     Emotionally abused: Not on file     Physically abused: Not on file     Forced sexual activity: Not on file   Other Topics Concern    Not on file   Social History Narrative    Not on file     No family history on file. Immunization History   Administered Date(s) Administered    Hepatitis B Vaccine 01/01/2001    Influenza Vaccine 01/09/2013    Influenza Vaccine Split 01/05/2012    Influenza Vaccine Whole 11/25/2009    PPD 11/25/2009, 01/07/2013    TB Skin Test (PPD) Intradermal 02/12/2014, 02/24/2015, 03/16/2015    TD Vaccine 01/01/2004, 11/12/2009     Current Outpatient Medications   Medication Sig    fluticasone (FLONASE) 50 mcg/actuation nasal spray One to two sprays each nostril daily     No current facility-administered medications for this visit. Allergies   Allergen Reactions    Pcn [Penicillins] Shortness of Breath     REVIEW OF SYSTEMS: colo 7/10 Dr Dao Frazier  Ophtho  no vision change or eye pain  Oral  no mouth pain, tongue or tooth problems  Ears  no hearing loss, ear pain, fullness, no swallowing problems  Cardiac  no CP, PND, orthopnea, edema, palpitations or syncope  Chest  no breast masses  Resp  no wheezing, chronic coughing, dyspnea  GI  no heartburn, nausea, vomiting, change in bowel habits, bleeding, hemorrhoids  Urinary  no dysuria, hematuria, flank pain, urgency, frequency  Psych  denies any anxiety or depression symptoms, no hallucinations or violent ideation  Constitutional  no wt loss, night sweats, unexplained fevers  Neuro  no focal weakness, numbness, paresthesias, incoordination, ataxia, involuntary movements  Endo - no polyuria, polydipsia, nocturia, hot flashes    Visit Vitals  /80   Pulse 72   Temp 96.8 °F (36 °C) (Temporal)   Resp 14   Ht 6' 0.25\" (1.835 m)   Wt 202 lb (91.6 kg)   SpO2 95%   BMI 27.21 kg/m²     Affect is appropriate.   Mood stable  No apparent distress  HEENT --Anicteric sclerae, tympanic membranes normal,  ear canals normal.  PERRL, EOMI, conjunctiva and lids normal.  Disks were sharp  Sinuses were nontender, turbinates normal, hearing normal.  Oropharynx without  erythema, normal tongue, oral mucosa and tonsils. No thyromegaly, JVD, or bruits. Lungs --Clear to auscultation, normal percussion. Heart --Regular rate and rhythm, no murmurs, rubs, gallops, or clicks. Chest wall --Nontender to palpation. PMI normal.  Abdomen -- Soft and nontender, no hepatosplenomegaly or masses.   And rectal deferred as he is going for colo  Extremities -- Without cyanosis, clubbing, edema. 2+ pulses equally and bilaterally.     LABS  From 5/10 showed   gluc 97, cr 0.90, gfr>60,          wbc 5.5, hb 12.9, plt 219  From 12/10 showed gluc 89, cr 0.90,    alt 52,         chol 207, tg 104, hdl 41, ldl-c 145, wbc 5.8, hb 13.3, plt 295, psa 0.20  From 12/11 showed gluc 83, cr 0.82, gfr 102, alt 26, ldl-p 2121, chol 196, tg 163, hdl 36, ldl-c 139,  wbc 4.6, hb 13.0, plt 267, psa 0.30, tsh 3.51  From 7/12 showed       ldl-p 1614, chol 180, tg 92,   hdl 35, ldl-c 127  From 12/12 showed       ldl-p 1671, chol 159, tg 67,   hdl 41, ldl-c 105  From 2/14 showed   gluc 90, cr 0.90, gfr>60, alt 38,         chol 188, tg 86,   hdl 37, ldl-c 134, wbc 4.9, hb 13.0, plt 337, psa 0.27, ua neg  From 2/15 showed   gluc 92, cr 0.90, gfr>60, alt 28,         chol 197, tg 127, hdl 38, ldl-c 133, wbc 5.3, hb 12.9, plt 293, psa 0.20, ua neg  From 2/16 showed   gluc 85, cr 0.70, gfr>60, alt 43,         chol 198, tg 121, hdl 39, ldl-c 135, wbc 5.5, hb 13.3, plt 305, psa 0.30, ua neg, ast 44, alk phos 71, hep c neg  From 3/17 showed              chol 175, tg 85,   hdl 41, ldl-c 117  From 4/18 showed   gluc 93, cr 0.80, gfr>60, alt 34,         chol 202, tg 113, hdl 37, ldl-c 143, wbc 5.3, hb 12.7, plt 290, tsh 3.00  From 4/19 showed                         wbc 4.4, hb 12.4, plt 268, psa 0.19    Patient Active Problem List Diagnosis Code    Sleep apnea Dr. Espana Erp G47.30    Dyslipidemia E78.5    Overweight with body mass index (BMI) of 25 to 25.9 in adult E66.3, Z68.25     ASSESSMENT AND PLAN:  1. Dyslipidemia. Declined statin and further stratification w ca score despite another long discussion  2. Sleep apnea. Continue CPAP and f/u Dr. Espana Erp   3. His forms were filled out  4. Colon screening. Referral sent  5. Dysphagia. Long discussion, referral sent  6. Yearly flu, shingrix advocated      RTC 7/21    Above conditions discussed at length and patient vocalized understanding.   All questions answered to patient satisfaction

## 2020-07-24 ENCOUNTER — OFFICE VISIT (OUTPATIENT)
Dept: INTERNAL MEDICINE CLINIC | Age: 60
End: 2020-07-24

## 2020-07-24 VITALS
DIASTOLIC BLOOD PRESSURE: 80 MMHG | RESPIRATION RATE: 14 BRPM | BODY MASS INDEX: 27.36 KG/M2 | OXYGEN SATURATION: 95 % | HEIGHT: 72 IN | TEMPERATURE: 96.8 F | SYSTOLIC BLOOD PRESSURE: 136 MMHG | WEIGHT: 202 LBS | HEART RATE: 72 BPM

## 2020-07-24 DIAGNOSIS — E78.5 DYSLIPIDEMIA: ICD-10-CM

## 2020-07-24 DIAGNOSIS — Z00.00 PHYSICAL EXAM: ICD-10-CM

## 2020-07-24 DIAGNOSIS — G47.33 OBSTRUCTIVE SLEEP APNEA SYNDROME: ICD-10-CM

## 2020-07-24 DIAGNOSIS — Z00.00 PHYSICAL EXAM: Primary | ICD-10-CM

## 2020-07-24 DIAGNOSIS — R13.10 DYSPHAGIA, UNSPECIFIED TYPE: ICD-10-CM

## 2020-07-24 DIAGNOSIS — Z12.11 SCREEN FOR COLON CANCER: ICD-10-CM

## 2020-07-24 DIAGNOSIS — E66.3 OVERWEIGHT WITH BODY MASS INDEX (BMI) OF 25 TO 25.9 IN ADULT: ICD-10-CM

## 2021-01-08 LAB — SARS-COV-2, NAA: DETECTED

## 2021-01-21 DIAGNOSIS — Z00.00 PHYSICAL EXAM: ICD-10-CM

## 2021-06-01 ENCOUNTER — HOSPITAL ENCOUNTER (INPATIENT)
Age: 61
LOS: 1 days | Discharge: HOME HEALTH | DRG: 065 | End: 2021-06-02
Attending: EMERGENCY MEDICINE | Admitting: INTERNAL MEDICINE
Payer: COMMERCIAL

## 2021-06-01 ENCOUNTER — TELEPHONE (OUTPATIENT)
Dept: INTERNAL MEDICINE CLINIC | Age: 61
End: 2021-06-01

## 2021-06-01 ENCOUNTER — APPOINTMENT (OUTPATIENT)
Dept: MRI IMAGING | Age: 61
DRG: 065 | End: 2021-06-01
Attending: INTERNAL MEDICINE
Payer: COMMERCIAL

## 2021-06-01 ENCOUNTER — APPOINTMENT (OUTPATIENT)
Dept: VASCULAR SURGERY | Age: 61
DRG: 065 | End: 2021-06-01
Attending: INTERNAL MEDICINE
Payer: COMMERCIAL

## 2021-06-01 ENCOUNTER — APPOINTMENT (OUTPATIENT)
Dept: CT IMAGING | Age: 61
DRG: 065 | End: 2021-06-01
Attending: EMERGENCY MEDICINE
Payer: COMMERCIAL

## 2021-06-01 DIAGNOSIS — E78.5 DYSLIPIDEMIA: ICD-10-CM

## 2021-06-01 DIAGNOSIS — I63.9 ACUTE STROKE DUE TO ISCHEMIA (HCC): ICD-10-CM

## 2021-06-01 DIAGNOSIS — G47.33 OBSTRUCTIVE SLEEP APNEA SYNDROME: ICD-10-CM

## 2021-06-01 DIAGNOSIS — I10 ESSENTIAL HYPERTENSION: ICD-10-CM

## 2021-06-01 DIAGNOSIS — R29.90 STROKE-LIKE SYMPTOMS: Primary | ICD-10-CM

## 2021-06-01 LAB
ANION GAP SERPL CALC-SCNC: 3 MMOL/L (ref 3–18)
ATRIAL RATE: 49 BPM
BASOPHILS # BLD: 0.1 K/UL (ref 0–0.1)
BASOPHILS NFR BLD: 1 % (ref 0–2)
BUN SERPL-MCNC: 21 MG/DL (ref 7–18)
BUN/CREAT SERPL: 24 (ref 12–20)
CALCIUM SERPL-MCNC: 9.3 MG/DL (ref 8.5–10.1)
CALCULATED P AXIS, ECG09: 48 DEGREES
CALCULATED R AXIS, ECG10: -10 DEGREES
CALCULATED T AXIS, ECG11: 27 DEGREES
CHLORIDE SERPL-SCNC: 112 MMOL/L (ref 100–111)
CO2 SERPL-SCNC: 27 MMOL/L (ref 21–32)
CREAT SERPL-MCNC: 0.86 MG/DL (ref 0.6–1.3)
DIAGNOSIS, 93000: NORMAL
DIFFERENTIAL METHOD BLD: ABNORMAL
EOSINOPHIL # BLD: 0.1 K/UL (ref 0–0.4)
EOSINOPHIL NFR BLD: 3 % (ref 0–5)
ERYTHROCYTE [DISTWIDTH] IN BLOOD BY AUTOMATED COUNT: 12.1 % (ref 11.6–14.5)
GLUCOSE BLD STRIP.AUTO-MCNC: 98 MG/DL (ref 70–110)
GLUCOSE SERPL-MCNC: 99 MG/DL (ref 74–99)
HCT VFR BLD AUTO: 40.5 % (ref 36–48)
HGB BLD-MCNC: 13.6 G/DL (ref 13–16)
INR PPP: 1 (ref 0.8–1.2)
LYMPHOCYTES # BLD: 1.3 K/UL (ref 0.9–3.6)
LYMPHOCYTES NFR BLD: 24 % (ref 21–52)
MCH RBC QN AUTO: 31.4 PG (ref 24–34)
MCHC RBC AUTO-ENTMCNC: 33.6 G/DL (ref 31–37)
MCV RBC AUTO: 93.5 FL (ref 74–97)
MONOCYTES # BLD: 0.5 K/UL (ref 0.05–1.2)
MONOCYTES NFR BLD: 9 % (ref 3–10)
NEUTS SEG # BLD: 3.3 K/UL (ref 1.8–8)
NEUTS SEG NFR BLD: 63 % (ref 40–73)
P-R INTERVAL, ECG05: 176 MS
PLATELET # BLD AUTO: 249 K/UL (ref 135–420)
PMV BLD AUTO: 10.1 FL (ref 9.2–11.8)
POTASSIUM SERPL-SCNC: 3.9 MMOL/L (ref 3.5–5.5)
PROTHROMBIN TIME: 13.3 SEC (ref 11.5–15.2)
Q-T INTERVAL, ECG07: 438 MS
QRS DURATION, ECG06: 98 MS
QTC CALCULATION (BEZET), ECG08: 395 MS
RBC # BLD AUTO: 4.33 M/UL (ref 4.35–5.65)
SODIUM SERPL-SCNC: 142 MMOL/L (ref 136–145)
TROPONIN I SERPL-MCNC: <0.02 NG/ML (ref 0–0.04)
TSH SERPL DL<=0.05 MIU/L-ACNC: 3.49 UIU/ML (ref 0.36–3.74)
VENTRICULAR RATE, ECG03: 49 BPM
WBC # BLD AUTO: 5.3 K/UL (ref 4.6–13.2)

## 2021-06-01 PROCEDURE — 99223 1ST HOSP IP/OBS HIGH 75: CPT | Performed by: PSYCHIATRY & NEUROLOGY

## 2021-06-01 PROCEDURE — 74011250636 HC RX REV CODE- 250/636: Performed by: PSYCHIATRY & NEUROLOGY

## 2021-06-01 PROCEDURE — 84484 ASSAY OF TROPONIN QUANT: CPT

## 2021-06-01 PROCEDURE — 74011250637 HC RX REV CODE- 250/637: Performed by: INTERNAL MEDICINE

## 2021-06-01 PROCEDURE — 99285 EMERGENCY DEPT VISIT HI MDM: CPT

## 2021-06-01 PROCEDURE — 70450 CT HEAD/BRAIN W/O DYE: CPT

## 2021-06-01 PROCEDURE — 74011250636 HC RX REV CODE- 250/636: Performed by: INTERNAL MEDICINE

## 2021-06-01 PROCEDURE — 70551 MRI BRAIN STEM W/O DYE: CPT

## 2021-06-01 PROCEDURE — 2709999900 HC NON-CHARGEABLE SUPPLY

## 2021-06-01 PROCEDURE — 85610 PROTHROMBIN TIME: CPT

## 2021-06-01 PROCEDURE — 80048 BASIC METABOLIC PNL TOTAL CA: CPT

## 2021-06-01 PROCEDURE — 84443 ASSAY THYROID STIM HORMONE: CPT

## 2021-06-01 PROCEDURE — 99223 1ST HOSP IP/OBS HIGH 75: CPT | Performed by: INTERNAL MEDICINE

## 2021-06-01 PROCEDURE — 85025 COMPLETE CBC W/AUTO DIFF WBC: CPT

## 2021-06-01 PROCEDURE — 82962 GLUCOSE BLOOD TEST: CPT

## 2021-06-01 PROCEDURE — 74011250637 HC RX REV CODE- 250/637: Performed by: EMERGENCY MEDICINE

## 2021-06-01 PROCEDURE — 65660000000 HC RM CCU STEPDOWN

## 2021-06-01 PROCEDURE — 93880 EXTRACRANIAL BILAT STUDY: CPT

## 2021-06-01 PROCEDURE — 74011250636 HC RX REV CODE- 250/636: Performed by: EMERGENCY MEDICINE

## 2021-06-01 PROCEDURE — 93005 ELECTROCARDIOGRAM TRACING: CPT

## 2021-06-01 RX ORDER — CLOPIDOGREL BISULFATE 75 MG/1
75 TABLET ORAL DAILY
Status: DISCONTINUED | OUTPATIENT
Start: 2021-06-02 | End: 2021-06-01

## 2021-06-01 RX ORDER — FAMOTIDINE 20 MG/1
20 TABLET, FILM COATED ORAL EVERY EVENING
Status: DISCONTINUED | OUTPATIENT
Start: 2021-06-01 | End: 2021-06-02 | Stop reason: HOSPADM

## 2021-06-01 RX ORDER — ACETAMINOPHEN 325 MG/1
650 TABLET ORAL
Status: DISCONTINUED | OUTPATIENT
Start: 2021-06-01 | End: 2021-06-01 | Stop reason: SDUPTHER

## 2021-06-01 RX ORDER — SODIUM CHLORIDE 0.9 % (FLUSH) 0.9 %
5-40 SYRINGE (ML) INJECTION AS NEEDED
Status: DISCONTINUED | OUTPATIENT
Start: 2021-06-01 | End: 2021-06-02 | Stop reason: HOSPADM

## 2021-06-01 RX ORDER — CLOPIDOGREL BISULFATE 75 MG/1
75 TABLET ORAL DAILY
Status: DISCONTINUED | OUTPATIENT
Start: 2021-06-02 | End: 2021-06-02 | Stop reason: HOSPADM

## 2021-06-01 RX ORDER — ATORVASTATIN CALCIUM 40 MG/1
80 TABLET, FILM COATED ORAL
Status: DISCONTINUED | OUTPATIENT
Start: 2021-06-01 | End: 2021-06-02 | Stop reason: HOSPADM

## 2021-06-01 RX ORDER — GUAIFENESIN 100 MG/5ML
324 LIQUID (ML) ORAL
Status: COMPLETED | OUTPATIENT
Start: 2021-06-01 | End: 2021-06-01

## 2021-06-01 RX ORDER — ACETAMINOPHEN 325 MG/1
650 TABLET ORAL
Status: DISCONTINUED | OUTPATIENT
Start: 2021-06-01 | End: 2021-06-02 | Stop reason: HOSPADM

## 2021-06-01 RX ORDER — CLOPIDOGREL 300 MG/1
300 TABLET, FILM COATED ORAL ONCE
Status: COMPLETED | OUTPATIENT
Start: 2021-06-01 | End: 2021-06-01

## 2021-06-01 RX ORDER — ACETAMINOPHEN 650 MG/1
650 SUPPOSITORY RECTAL
Status: DISCONTINUED | OUTPATIENT
Start: 2021-06-01 | End: 2021-06-02 | Stop reason: HOSPADM

## 2021-06-01 RX ORDER — GUAIFENESIN 100 MG/5ML
81 LIQUID (ML) ORAL DAILY
Status: DISCONTINUED | OUTPATIENT
Start: 2021-06-02 | End: 2021-06-01

## 2021-06-01 RX ORDER — SODIUM CHLORIDE 0.9 % (FLUSH) 0.9 %
5-40 SYRINGE (ML) INJECTION EVERY 8 HOURS
Status: DISCONTINUED | OUTPATIENT
Start: 2021-06-01 | End: 2021-06-02 | Stop reason: HOSPADM

## 2021-06-01 RX ORDER — PROMETHAZINE HYDROCHLORIDE 12.5 MG/1
12.5 TABLET ORAL
Status: DISCONTINUED | OUTPATIENT
Start: 2021-06-01 | End: 2021-06-02 | Stop reason: HOSPADM

## 2021-06-01 RX ORDER — POLYETHYLENE GLYCOL 3350 17 G/17G
17 POWDER, FOR SOLUTION ORAL DAILY PRN
Status: DISCONTINUED | OUTPATIENT
Start: 2021-06-01 | End: 2021-06-02 | Stop reason: HOSPADM

## 2021-06-01 RX ORDER — GUAIFENESIN 100 MG/5ML
81 LIQUID (ML) ORAL DAILY
Status: DISCONTINUED | OUTPATIENT
Start: 2021-06-01 | End: 2021-06-02 | Stop reason: HOSPADM

## 2021-06-01 RX ORDER — ONDANSETRON 2 MG/ML
4 INJECTION INTRAMUSCULAR; INTRAVENOUS
Status: DISCONTINUED | OUTPATIENT
Start: 2021-06-01 | End: 2021-06-02 | Stop reason: HOSPADM

## 2021-06-01 RX ORDER — SODIUM CHLORIDE 9 MG/ML
125 INJECTION, SOLUTION INTRAVENOUS CONTINUOUS
Status: DISCONTINUED | OUTPATIENT
Start: 2021-06-01 | End: 2021-06-02 | Stop reason: HOSPADM

## 2021-06-01 RX ORDER — ASPIRIN 81 MG/1
81 TABLET ORAL DAILY
Status: DISCONTINUED | OUTPATIENT
Start: 2021-06-02 | End: 2021-06-01

## 2021-06-01 RX ORDER — SODIUM CHLORIDE 9 MG/ML
1000 INJECTION, SOLUTION INTRAVENOUS ONCE
Status: COMPLETED | OUTPATIENT
Start: 2021-06-01 | End: 2021-06-01

## 2021-06-01 RX ORDER — LABETALOL HCL 20 MG/4 ML
5 SYRINGE (ML) INTRAVENOUS
Status: DISCONTINUED | OUTPATIENT
Start: 2021-06-01 | End: 2021-06-02 | Stop reason: HOSPADM

## 2021-06-01 RX ORDER — HEPARIN SODIUM 5000 [USP'U]/ML
5000 INJECTION, SOLUTION INTRAVENOUS; SUBCUTANEOUS EVERY 8 HOURS
Status: DISCONTINUED | OUTPATIENT
Start: 2021-06-01 | End: 2021-06-02 | Stop reason: HOSPADM

## 2021-06-01 RX ADMIN — FAMOTIDINE 20 MG: 20 TABLET ORAL at 18:28

## 2021-06-01 RX ADMIN — HEPARIN SODIUM 5000 UNITS: 5000 INJECTION INTRAVENOUS; SUBCUTANEOUS at 21:00

## 2021-06-01 RX ADMIN — ASPIRIN 324 MG: 81 TABLET, CHEWABLE ORAL at 09:50

## 2021-06-01 RX ADMIN — HEPARIN SODIUM 5000 UNITS: 5000 INJECTION INTRAVENOUS; SUBCUTANEOUS at 14:46

## 2021-06-01 RX ADMIN — Medication 10 ML: at 21:00

## 2021-06-01 RX ADMIN — ATORVASTATIN CALCIUM 80 MG: 40 TABLET, FILM COATED ORAL at 21:10

## 2021-06-01 RX ADMIN — CLOPIDOGREL BISULFATE 300 MG: 300 TABLET, FILM COATED ORAL at 14:45

## 2021-06-01 RX ADMIN — SODIUM CHLORIDE 125 ML/HR: 900 INJECTION, SOLUTION INTRAVENOUS at 17:05

## 2021-06-01 RX ADMIN — SODIUM CHLORIDE 1000 ML/HR: 900 INJECTION, SOLUTION INTRAVENOUS at 17:05

## 2021-06-01 RX ADMIN — ACETAMINOPHEN 650 MG: 325 TABLET ORAL at 21:10

## 2021-06-01 NOTE — ED TRIAGE NOTES
Patient arrives via EMS d/t complaints of dizizness and left arm weakness that started at approximately 4 this morning.  Patient states it felt as though \"my arm and brain were not communicating\" Patient denies headache, numbness, tingling

## 2021-06-01 NOTE — CONSULTS
NEUROLOGY CONSULT NOTE    Patient ID:  Maggie West  [id number]  63 y.o.  1960    Date of Consultation:  June 1, 2021    Referring Physician: Tawanda Rojas MD    Reason for Consultation: Left arm more than leg weakness        Subjective:       History of Present Illness:     Mr Alexis Street is a 67-year-old man, , right-hand-dominant, with history of hyperlipidemia, KAVYA, evaluated today for left arm more than leg weakness. The patient is a . When he woke up this morning at 4:00 he felt left arm weakness and a sensation like the arm was not really doing what it supposed to do. The daughter is present at the bedside and stated that few years ago he had left arm and leg tingling/numbness. He is not using any illicit drugs. He has no other complaints today. No swallowing problems. No headaches. No vision problems. The patient had Covid infection in January 2021. He has no history of irregular heart rate.     Patient Active Problem List    Diagnosis Date Noted    Stroke-like symptom 06/01/2021    Acute stroke due to ischemia ([facility] [address] 06/01/2021    Overweight with body mass index (BMI) of 25 to 25.9 in adult 03/13/2017    Dyslipidemia 02/04/2014    Sleep apnea Dr. Kindra Hopper      Past Medical History:   Diagnosis Date    Allergic rhinitis     Benign fundic gland polyps of stomach 09/15/2020    Dr Selene Goff    COVID-19 virus infection 01/2021    urgent care    Dyslipidemia     calculated 10 year risk score was 6.7%(2/14); 7.7% (3/15); 7.9% (2/15); 6.3% (3/17); 9.3% (4/18); declined statin repeatedly; declined ca score    KAVYA (obstructive sleep apnea)     Dr. Frederick Essex Overweight(278.02)     Zoster 1990s    right T10      Past Surgical History:   Procedure Laterality Date    HX COLONOSCOPY      Dr Melissa Diaz 7/16/10 neg; Dr Selene Goff 9/15/20 neg    [id number] [facility]  11/08    echo nl lv, ef 55%    [place] CARDIAC SURG PROCEDURE UNLIST  2007    thallium negative      Prior to Admission medications    Not on File     Allergies   Allergen Reactions    Pcn [Penicillins] Shortness of Breath      Social History     Tobacco Use    Smoking status: Never Smoker    Smokeless tobacco: Never Used   Substance Use Topics    Alcohol use: No      No family history on file. Review of Systems    Constitutional: No recent weight change, fever,fatigue, sleep difficulties, or loss of appetite. ENT/Mouth:  No hearing loss, ringing in the ears, chronic sinus problem, nose bleeds sore throat, voice change, hoarseness, swollen glands in neck, or difficulties with chewing and swallowing. Cardiovascular:  No chest pain/angina pectoris, palpitations,swelling of feet/ankles/hands, or calf pain while walking. Respiratory: No chronic or frequent coughs, spitting up blood, shortness of breath, asthma, or wheezing. Gastrointestinal: No abdominal pain, heartburn, nausea, vomiting, constipation, frequent diarrhea, rectal bleeding, or blood in stool. Genitourinary: No frequent urination, burning or painful urination, blood in urine, incontinence or dribbling. Musculoskeletal:   No joint pain, stiffness/swelling, weakness of muscles, muscle pain/cramp, or back pain. Integument:   No rash/itching, change in skin color, change in hair/nails, or change in color/size of moles. Neurological:  No dizziness/vertigo, loss of consciousness, numbness/tingling sensation, tremors, weakness in limbs, difficulty with balance, frequent or recurring headaches, memory loss or confusion. Psychiatric:   No nervousness, depression, hallucinations, paranoia or suspiciousness. Endocrine: No excessive thirst or urination, heat or cold intolerance. Hematologic/Lymphatic: No bleeding/bruising tendency, phlebitis, or past transfusion.        Objective:     Patient Vitals for the past 8 hrs:   BP Temp Pulse Resp SpO2 Height Weight   06/01/21 1500 (!) 133/91 -- 64 14 95 % -- --   06/01/21 1345 (!) 144/90 -- 63 14 98 % -- --   06/01/21 1330 (!) 145/90 -- 62 16 99 % -- --   06/01/21 1315 (!) 183/95 -- 62 13 98 % -- --   06/01/21 1300 (!) 144/97 -- (!) 49 10 98 % -- --   06/01/21 1254 139/73 -- -- -- 98 % -- --   06/01/21 1145 (!) 157/96 -- (!) 59 16 97 % -- --   06/01/21 1130 (!) 153/105 -- (!) 56 13 98 % -- --   06/01/21 1115 (!) 165/105 -- (!) 56 14 98 % -- --   06/01/21 1100 (!) 168/108 -- (!) 54 10 97 % -- --   06/01/21 1045 (!) 145/119 -- (!) 58 13 97 % -- --   06/01/21 1030 (!) 169/89 -- (!) 54 13 96 % -- --   06/01/21 1000 (!) 156/89 -- -- 15 94 % -- --   06/01/21 0945 (!) 171/100 -- (!) 55 15 94 % -- --   06/01/21 0930 (!) 175/101 -- 61 15 97 % -- --   06/01/21 0915 (!) 164/85 -- 66 11 97 % -- --   06/01/21 0857 -- -- -- -- 97 % -- --   06/01/21 0855 (!) 174/99 98 °F (36.7 °C) 71 16 97 % 6' 1\" (1.854 m) 86.2 kg (190 lb)       General Exam  No acute distress, normal body habitus    HEENT: Normocephalic, atraumatic, Sclera anicteric, normal conjunctiva  Mucous membranes: normal color and hydration     CV: No carotid bruits,   Heart: regular to rate and rhythm. No murmurs . On monitor there are evidence of bigeminy at times. RESP:  CTAB     Neurologic Exam:    MENTAL STATUS:     The patient is awake, alert, and oriented x 4. Fund of knowledge is adequate. Speech is fluent and memory appears to be intact, both long and short term. CRANIAL NERVES:   II -Pupils are midline, symmetric, both reactive to light and accommodation. Visual fields are normal.    III, IV, VI - Extraocular movements are intact and there is no nystagmus. V - Facial sensation is intact to pinprick and light touch. VII - Face is with subtle asymmetry on the left lower face. VIII - Hearing is present. IX, X, XII- Palate rises symmetrically. Gag is present. Tongue is in the midline.                                                   XI - Shoulder shrugging and head turning intact    MOTOR:          Tone is normal.  Muscle bulk is normal.  The patient is 5/5 in all four limbs with exception of left upper extremity 4/5 with left pronator drift. No involuntary movements    SENSATION:  Sensory examination is intact to pinprick, light touch and position sense testing. REFLEXES: 2+ and symmetrical. Plantars are down going. COORDINATION: Cerebellar examination reveals no gross ataxia or dysmetria. GAIT: Gait is not tested at this time. Data Review:    Recent Results (from the past 24 hour(s))   CBC WITH AUTOMATED DIFF    Collection Time: 06/01/21  9:01 AM   Result Value Ref Range    WBC 5.3 4.6 - 13.2 K/uL    RBC 4.33 (L) 4.35 - 5.65 M/uL    HGB 13.6 13.0 - 16.0 g/dL    HCT 40.5 36.0 - 48.0 %    MCV 93.5 74.0 - 97.0 FL    MCH 31.4 24.0 - 34.0 PG    MCHC 33.6 31.0 - 37.0 g/dL    RDW 12.1 11.6 - 14.5 %    PLATELET 862 652 - 070 K/uL    MPV 10.1 9.2 - 11.8 FL    NEUTROPHILS 63 40 - 73 %    LYMPHOCYTES 24 21 - 52 %    MONOCYTES 9 3 - 10 %    EOSINOPHILS 3 0 - 5 %    BASOPHILS 1 0 - 2 %    ABS. NEUTROPHILS 3.3 1.8 - 8.0 K/UL    ABS. LYMPHOCYTES 1.3 0.9 - 3.6 K/UL    ABS. MONOCYTES 0.5 0.05 - 1.2 K/UL    ABS. EOSINOPHILS 0.1 0.0 - 0.4 K/UL    ABS.  BASOPHILS 0.1 0.0 - 0.1 K/UL    DF AUTOMATED     METABOLIC PANEL, BASIC    Collection Time: 06/01/21  9:01 AM   Result Value Ref Range    Sodium 142 136 - 145 mmol/L    Potassium 3.9 3.5 - 5.5 mmol/L    Chloride 112 (H) 100 - 111 mmol/L    CO2 27 21 - 32 mmol/L    Anion gap 3 3.0 - 18 mmol/L    Glucose 99 74 - 99 mg/dL    BUN 21 (H) 7.0 - 18 MG/DL    Creatinine 0.86 0.6 - 1.3 MG/DL    BUN/Creatinine ratio 24 (H) 12 - 20      GFR est AA >60 >60 ml/min/1.73m2    GFR est non-AA >60 >60 ml/min/1.73m2    Calcium 9.3 8.5 - 10.1 MG/DL   TROPONIN I    Collection Time: 06/01/21  9:01 AM   Result Value Ref Range    Troponin-I, QT <0.02 0.0 - 0.045 NG/ML   PROTHROMBIN TIME + INR    Collection Time: 06/01/21  9:01 AM   Result Value Ref Range    Prothrombin time 13.3 11.5 - 15.2 sec    INR 1.0 0.8 - 1.2 TSH 3RD GENERATION    Collection Time: 06/01/21  9:01 AM   Result Value Ref Range    TSH 3.49 0.36 - 3.74 uIU/mL   EKG, 12 LEAD, INITIAL    Collection Time: 06/01/21 10:23 AM   Result Value Ref Range    Ventricular Rate 49 BPM    Atrial Rate 49 BPM    P-R Interval 176 ms    QRS Duration 98 ms    Q-T Interval 438 ms    QTC Calculation (Bezet) 395 ms    Calculated P Axis 48 degrees    Calculated R Axis -10 degrees    Calculated T Axis 27 degrees    Diagnosis       Sinus bradycardia with sinus arrhythmia  Incomplete right bundle branch block  Borderline ECG  No previous ECGs available     DUPLEX CAROTID BILATERAL    Collection Time: 06/01/21  2:11 PM   Result Value Ref Range    Right cca dist sys 69.9 cm/s    Right CCA dist webb 14.9 cm/s    RIGHT COMMON CAROTID ARTERY MID S 80.59 cm/s    RIGHT COMMON CAROTID ARTERY MID D 14.37 cm/s    Right CCA prox sys 119.4 cm/s    Right CCA prox webb 12.8 cm/s    Right Bulb PSV 90.4 cm/s    Right Bulb EDV 9.1 cm/s    Right ICA dist sys 106.5 cm/s    Right ICA dist webb 27.6 cm/s    Right ICA mid sys 95.6 cm/s    Right ICA mid webb 31.5 cm/s    Right ICA prox sys 77.1 cm/s    Right ICA prox webb 21.5 cm/s    Right vertebral sys 40.9 cm/s    RIGHT VERTEBRAL ARTERY D 8.00 cm/s    Right ICA/CCA sys 1.5     Left CCA dist sys 60.7 cm/s    Left CCA dist webb 15.4 cm/s    LEFT COMMON CAROTID ARTERY MID S 114.66 cm/s    LEFT COMMON CAROTID ARTERY MID D 17.60 cm/s    Left CCA prox sys 100.4 cm/s    Left CCA prox webb 18.9 cm/s    Left Bulb PSV 72.9 cm/s    Left Bulb EDV 15.4 cm/s    Left ICA dist sys 105.9 cm/s    Left ICA dist webb 31.8 cm/s    Left ICA mid sys 73.2 cm/s    Left ICA mid webb 24.1 cm/s    Left ICA prox sys 69.5 cm/s    Left ICA prox webb 20.0 cm/s    Left vertebral sys 50.0 cm/s    LEFT VERTEBRAL ARTERY D 21.20 cm/s    Left ICA/CCA sys 1.74     Right eca sys 85.0 cm/s    Right subclavian sys 79.4 cm/s    Left ECA sys 85.3 cm/s    Left subclavian sys 127.5 cm/s    RIGHT EXTERNAL CAROTID ARTERY D 4.7 cm/s    LEFT SUBCLAVIAN ARTERY D 0.0 cm/s    LEFT EXTERNAL CAROTID ARTERY D 5.0 cm/s    RIGHT SUBCLAVIAN ARTERY D 0.0 cm/s         Radiology studies:   Head CT negative for acute pathology. Brain MRI significant for right basal ganglia acute stroke. Ultrasound carotids:   Right Carotid    There is intimal thickening present in the right CCA. Carotid bulb has intimal thickening plaque. There is mild stenosis in the right ICA (<50%) and at the proximal segment. The right ICA has mild and homogeneous plaque. There is no stenosis in the right ECA. The right vertebral is antegrade. There is no stenosis in the right vertebral artery. The right subclavian is normal.   Left Carotid    There is intimal thickening present in the left CCA. Carotid bulb has homogeneous plaque. There is mild stenosis in the left ICA (<50%) and at the proximal segment . The left ICA has mild and homogeneous plaque. There is no stenosis in the left ECA. The left vertebral is antegrade. There is no stenosis in the left vertebral artery. The left subclavian is normal.         80  minutes were spent on the patient of which more than 50% was spent in coordination of care and counseling (time spent with patient/family face to face, physical exam, reviewing laboratory and imaging investigations, speaking with physicians and nursing staff involved in this patient's care)    Assessment: This is a 50-year-old man, with past medical history of hyperlipidemia, KAVYA, who works as a , evaluated today for acute onset left hemiparesis particularly involving the arm. On exam the patient has mild left upper extremity weakness with pronator drift. Brain MRI confirmed an acute stroke right basal ganglia. Etiology suspected to be artery to artery or small vessel ischemic changes in the setting of hypertension.       Plan:     -Plavix 300 mg x 1 today loading dose, after that continue tomorrow 75 mg every day   -Aspirin 81 mg chewable start today  -Continue dual antiplatelet therapy for 3 months due to evidence of intracranial and  extracranial atherosclerotic disease and after that continue with aspirin 325 mg  -Lipitor 81 mg   -We will continue to monitor on telemetry -during my examination there was evidence of bigeminy which is short-lived that can place him at risk of underlying A. fib but we can talk with cardiology about this if any concerns  -Echo with bubble study  -PT OT  -Based on DMV recommendation unfortunately he should not drive for the next 6 months  - will follow          Suki Dorman MD  Adult Neurologist  6/1/2021

## 2021-06-01 NOTE — H&P
History & Physical    Patient: Steff Cervantes MRN: [id number]  CSN: [id number]    YOB: 1960  Age: 63 y.o. Sex: male      DOA: 6/1/2021  CC:  Left arm weakness and imbalance     PCP: Tammie Bosworth, MD       HPI:     Steff Cervantes is a 63 y.o. male with medical co-morbidities including KAVYA on CPAP, hyperlipidemia not on statin, presented from home with acute onset of left arm weakness, numbness and a sensation of imbalance started about 4 AM.  He has been in normal state of health without similar symptom in the past. He woke up this AM thinking that he slept on his left arm but the numbness and weakness lasted for a few hours. He denied feeling weak in his leg but felt imbalance and felt his body favored the left side. He had associated difficulty in swallowing when eating his breakfast this AM. No chest pain. No Nausea/vomiting. In the ER, he was found with elevated BP, bradycardia on his EKG. CT head was negative. Negative troponin. Aspirin 325 mg was given after he was evaluated by teleneurology for suspected stroke. His left arm weakness and numbness is now resolved         Review of Systems  GENERAL: No fever, No chill, No malaise   HEENT: No change in vision, no ear ache, no sore throat or sinus congestion. NECK: No pain or stiffness. PULMONARY: No shortness of breath, no cough or wheeze. Cardiovascular: no pnd / orthopnea, no Chest Pain  GASTROINTESTINAL: No abd pain, No nausea/vomiting, No diarrhea, No bright red blood per rectum. GENITOURINARY: No urinary frequency, No urgency or pain with urination. MUSCULOSKELETAL: No joint or muscle pain, no back pain, no recent trauma. DERMATOLOGIC: No rash, no itching, no lesions. ENDOCRINE: No polyuria, polydipsia, No recent change in weight. HEMATOLOGICAL: No easy bruising or bleeding.    NEUROLOGIC: No headache, No seizures, +left-sided weakness/numbness, imbalance         Past Medical History:   Diagnosis Date  Allergic rhinitis     Benign fundic gland polyps of stomach 09/15/2020    Dr Varun Rodríguez    COVID-19 virus infection 01/2021    urgent care    Dyslipidemia     calculated 10 year risk score was 6.7%(2/14); 7.7% (3/15); 7.9% (2/15); 6.3% (3/17); 9.3% (4/18); declined statin repeatedly; declined ca score    KAVYA (obstructive sleep apnea)     Dr. Marisol Mejia Overweight(278.02)     Zoster 1990s    right T10       Past Surgical History:   Procedure Laterality Date    HX COLONOSCOPY      Dr Bell Severino 7/16/10 neg; Dr Varun Rodríguez 9/15/20 neg    Christiano Lozano  11/08    echo nl lv, ef 55%    GA CARDIAC SURG PROCEDURE UNLIST  2007    thallium negative       No family history on file. Social History     Socioeconomic History    Marital status:      Spouse name: Not on file    Number of children: 2    Years of education: Not on file    Highest education level: Not on file   Occupational History    Occupation:    Tobacco Use    Smoking status: Never Smoker    Smokeless tobacco: Never Used   Substance and Sexual Activity    Alcohol use: No    Drug use: No     Social Determinants of Health     Financial Resource Strain:     Difficulty of Paying Living Expenses:    Food Insecurity:     Worried About Running Out of Food in the Last Year:     [address] in the Last Year:    Transportation Needs:     Lack of Transportation (Medical):      Lack of Transportation (Non-Medical):    Physical Activity:     Days of Exercise per Week:     Minutes of Exercise per Session:    Stress:     Feeling of Stress :    Social Connections:     Frequency of Communication with Friends and Family:     Frequency of Social Gatherings with Friends and Family:     Attends Judaism Services:     Active Member of Clubs or Organizations:     Attends Club or Organization Meetings:     Marital Status:        Prior to Admission medications    Not on File       Allergies   Allergen Reactions    Pcn [Penicillins] Shortness of Breath              Physical Exam:      Visit Vitals  BP (!) 156/89   Pulse (!) 55   Temp 98 °F (36.7 °C)   Resp 15   Ht 6' 1\" (1.854 m)   Wt 86.2 kg (190 lb)   SpO2 94%   BMI 25.07 kg/m²       Physical Exam:  Tele: sinus   General:  Cooperative, Not in acute distress, speaks in full sentence while in bed  HEENT: PERRL, EOMI, supple neck, no JVD, dry oral mucosa  Cardiovascular: S1S2 regular, no rub/gallop   Pulmonary: air entry bilaterally, no wheezing, no crackle  GI:  Soft, non tender, non distended, +bs, no guarding   Extremities:  No pedal edema, +distal pulses appreciated   Neuro: AOx3, moving all extremities, 4/5 weakness in the left upper extremity at proximal/distal end. Normal strength on the right upper extremity. 4/5 strength left lower extremity at proximal/distal end. Sensation intact. Normal strength on right lower extremity. CN intact     Lab/Data Review:  Labs: Results:       Chemistry Recent Labs     06/01/21  0901   GLU 99      K 3.9   *   CO2 27   BUN 21*   CREA 0.86   CA 9.3   AGAP 3   BUCR 24*      CBC w/Diff Recent Labs     06/01/21  0901   WBC 5.3   RBC 4.33*   HGB 13.6   HCT 40.5      GRANS 63   LYMPH 24   EOS 3      Coagulation Recent Labs     06/01/21  0901   PTP 13.3   INR 1.0       Iron/Ferritin No results for input(s): IRON in the last 72 hours. No lab exists for component: TIBCCALC   BNP No results for input(s): BNPP in the last 72 hours. Cardiac Enzymes No results for input(s): CPK, CKND1, MACK in the last 72 hours. No lab exists for component: CKRMB, TROIP   Liver Enzymes No results for input(s): TP, ALB, TBIL, AP in the last 72 hours.     No lab exists for component: SGOT, GPT, DBIL   Thyroid Studies Lab Results   Component Value Date/Time    TSH 3.00 03/27/2018 10:16 AM          All Micro Results     None          Imaging Reviewed:  CT Results (most recent):  Results from Hospital Encounter encounter on 06/01/21    CT HEAD WO CONT    Narrative  CT OF THE HEAD WITHOUT CONTRAST. CLINICAL HISTORY: CODE S. Left-sided weakness on awakening at 4:00 AM, last seen  normal at 11:00 PM.    TECHNIQUE: Helical scan obtained of the head were obtained from the skull vertex  through the base of the skull without intravenous contrast.    All CT scans are  performed using dose optimization techniques as appropriate to the performed  exam including the following: Automated exposure control, adjustment of mA  and/or kV according to patient size, and use of iterative reconstructive  technique. COMPARISON: 5/4/2010. FINDINGS:    The sulcal pattern and ventricular system are normal in size and configuration  for age. No intracranial hemorrhage. No mass effect. The visualized paranasal  sinuses are clear. The mastoid air cells are clear. The visualized bony  structures are unremarkable. Impression  No acute hemorrhage. Stable, unremarkable exam.    Findings called to Dr. Davonte Diaz at 9:19 AM on 6/1/2021. Assessment:   Active Problems:  1. Acute Left arm weakness, numbness, possible Stroke-like symptom   2. Gait imbalance, possible stroke associated   3. Hypertension   4. Hyperlipidemia  5. KAVYA on CPAP   6. Odynophagia, ? GERD    ADDENDUM: acute CVA on MRI brain. Spoke with neurology for further care. Add plavix for 21 days      Plan:     Admitted to stroke unit for further monitor. Stroke order set placed. Allow for permissive hypertension at this time. Labetalol prn for SBP>210. CT head noted. MRI brain and Echo ordered. SPT/PT/OT consult  Start ASA, Atorvastatin. Ordered for his CPAP at bedtime.    pepcid   Neurology consult follow up       Risk of deterioration:  []Low    [x]Moderate  []High     Prophylaxis:  []Lovenox  []Coumadin  [x]Hep SQ  []SCDs  [x]H2B/PPI     Disposition:  []Home w/ Family   [x] PT,OT,RN   []SNF/LTC   []SAH/Rehab     Discussed Code Status:         [x]Full Code      []DNR ___________________________________________________     Care Plan discussed with:    [x]Patient   [x]Family    []ED Care Manager  [x]ED Doc   []Specialist :  Total Time Coordinating Admission:  60   minutes    []Total Critical Care Time:       Stacey Pond MD  6/1/2021, 11:01 AM

## 2021-06-01 NOTE — TELEPHONE ENCOUNTER
Pt wife calling to let RD know patient did have a stroke. Says they are pulling his license to drive for 6 months. She doesn't know what to do. Should they file medical disability?     Advised her to talk to people at hospital

## 2021-06-01 NOTE — ED NOTES
Patient is in the ED awaiting an inpatient bed.   The neurologist came to the nurses station and asked for an order for 1000 mL of normal saline then 125 mL an hour and I placed that order for her however the patient is now under the care of the hospitalist and the neurologist.Hardeep Evans,  3:16 PM

## 2021-06-01 NOTE — ED NOTES
Patient presenting with slight facial droop and c/o feeling more \"off balance\" when ambulating.  Dr. Evelyn Simms made aware

## 2021-06-01 NOTE — PROGRESS NOTES
MRI Screening form needs to be filled out and faxed to [phone] MRI can be scheduled. If unable to obtain information from pt, [facility] needs to be contacted.  If pt is claustro or will need pain meds, please have ordered in advance in order to facilitate exam.

## 2021-06-01 NOTE — PROGRESS NOTES
completed the initial Spiritual Assessment of the patient in bed 7 of the emergency room where he came in this morning as a Code S patient was  sent directly to CT scan. and offered Pastoral Care support upon his return to the emergency room. Patient admits that he does  not have or wants an advance directive. Patient does not have any Jew/cultural needs that will affect patients preferences in health care. Chaplains will continue to follow and will provide pastoral care on an as needed/requested basis.     Cresenciano Nyhan  Spiritual Care Department  [phone]

## 2021-06-01 NOTE — PROGRESS NOTES
ARU/IPR REFERRAL CONTACT NOTE  [id number] [place] [address] for Physical Rehabilitation    RE:  Hari Warren Thank you for the opportunity to review this patient's case for admission to [id number] [place] [address] for Physical Rehabilitation. Based on our pre-admission screening:     [place] ] Our Team/Medical Director is following this case. Comments:  Referral received; pending therapy assessments and recommendations. Please be advised admission to ARU will be based on meeting admission requirements and authorization from Karyna Tidwell. Thank you for this referral.   Please do not hesitate to call if you need further information or have additional questions. Regards,    WILBUR Mills PTA for Rosita Gregorio, [facility]  Admissions [facility] for Physical Rehabilitation  [phone]

## 2021-06-01 NOTE — ED PROVIDER NOTES
EMERGENCY DEPARTMENT HISTORY AND PHYSICAL EXAM    8:54 AM    Date: 6/1/2021  Patient Name: Elfego Martínez    History of Presenting Illness     Chief Complaint   Patient presents with    Dizziness    Extremity Weakness       History Provided By: Patient  Location/Duration/Severity/Modifying factors   Patient is a 48-year-old male with no reported medical history presented to the emergency department with complaints of left upper extremity, hand forearm and bicep initially numbness upon awakening from sleep this morning which he reports progressed to discoordination when using the left hand and left upper extremity. He says it felt slightly weak and that he was not able to do what he wanted to do with the left arm. 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 He also notes that he was dizzy and felt off balance though he describes it more of a lightheaded sensation he feels like he was listing to his left side.           PCP: Rafael Hernandez MD    Current Facility-Administered Medications   Medication Dose Route Frequency Provider Last Rate Last Admin    atorvastatin (LIPITOR) tablet 80 mg  80 mg Oral QHS Brenda Hall MD        labetaloL (NORMODYNE;TRANDATE) 20 mg/4 mL (5 mg/mL) injection 5 mg  5 mg IntraVENous Q10MIN PRN Brenda Hall MD        heparin (porcine) injection 5,000 Units  5,000 Units SubCUTAneous Q8H Brenda Hall MD   5,000 Units at 06/01/21 1446    famotidine (PEPCID) tablet 20 mg  20 mg Oral QPM Brenda Hall MD        acetaminophen (TYLENOL) tablet 650 mg  650 mg Oral Q6H PRN Brenda Hall MD        Or   [facility] acetaminophen (TYLENOL) suppository 650 mg  650 mg Rectal Q6H PRN Brenda Hall MD        [START ON 6/2/2021] clopidogreL (PLAVIX) tablet 75 mg  75 mg Oral DAILY Brenda Hall MD        0.9% sodium chloride infusion  125 mL/hr IntraVENous CONTINUOUS Marian Strong MD        0.9% sodium chloride infusion  1,000 mL/hr IntraVENous ONCE Virl Felty, MD        aspirin chewable tablet 81 mg  81 mg Oral DAILY Virl Felty, MD           Past History     Past Medical History:  Past Medical History:   Diagnosis Date    Allergic rhinitis     Benign fundic gland polyps of stomach 09/15/2020    Dr Corinne Brought    COVID-19 virus infection 01/2021    urgent care    Dyslipidemia     calculated 10 year risk score was 6.7%(2/14); 7.7% (3/15); 7.9% (2/15); 6.3% (3/17); 9.3% (4/18); declined statin repeatedly; declined ca score    KAVYA (obstructive sleep apnea)     Dr. Rajeev Reece Overweight(278.02)     Zoster 1990s    right T10       Past Surgical History:  Past Surgical History:   Procedure Laterality Date   Kayla Lau 7/16/10 neg; Dr Corinne Brought 9/15/20 neg    [id number] [facility]  11/08    echo nl lv, ef 55%    [place] CARDIAC SURG PROCEDURE UNLIST  2007    thallium negative       Family History:  No family history on file. Social History:  Social History     Tobacco Use    Smoking status: Never Smoker    Smokeless tobacco: Never Used   Substance Use Topics    Alcohol use: No    Drug use: No       Allergies: Allergies   Allergen Reactions    Pcn [Penicillins] Shortness of Breath       I reviewed and confirmed the above information with patient and updated as necessary. Review of Systems     Review of Systems   Constitutional: Negative for fever. HENT: Negative for congestion and rhinorrhea. Eyes: Negative for visual disturbance. Respiratory: Negative for cough and shortness of breath. Cardiovascular: Negative for chest pain. Gastrointestinal: Negative for abdominal pain, diarrhea, nausea and vomiting. Genitourinary: Negative for dysuria and urgency. Musculoskeletal: Negative for back pain, gait problem, myalgias and neck pain. Skin: Negative for rash. Neurological: Positive for dizziness and numbness. Negative for light-headedness and headaches.        Physical Exam     Visit Vitals  BP (!) 133/91   Pulse 64   Temp 98 °F (36.7 °C)   Resp 14   Ht 6' 1\" (1.854 m)   Wt 86.2 kg (190 lb)   SpO2 95%   BMI 25.07 kg/m²       Physical Exam  Constitutional:       General: He is not in acute distress. Appearance: Normal appearance. He is normal weight. He is not ill-appearing or toxic-appearing. HENT:      Head: Normocephalic and atraumatic. Right Ear: External ear normal.      Left Ear: External ear normal.      Nose: Nose normal.      Mouth/Throat:      Mouth: Mucous membranes are moist.      Pharynx: No oropharyngeal exudate or posterior oropharyngeal erythema. Eyes:      Conjunctiva/sclera: Conjunctivae normal.      Pupils: Pupils are equal, round, and reactive to light. Cardiovascular:      Rate and Rhythm: Normal rate and regular rhythm. Pulses: Normal pulses. Heart sounds: Normal heart sounds. No murmur heard. No friction rub. Pulmonary:      Effort: Pulmonary effort is normal.      Breath sounds: Normal breath sounds. No wheezing, rhonchi or rales. Abdominal:      General: Abdomen is flat. Tenderness: There is no abdominal tenderness. There is no guarding or rebound. Musculoskeletal:         General: No swelling or tenderness. Normal range of motion. Cervical back: Normal range of motion and neck supple. Right lower leg: No edema. Left lower leg: No edema. Skin:     General: Skin is warm and dry. Capillary Refill: Capillary refill takes less than 2 seconds. Neurological:      General: No focal deficit present. Mental Status: He is alert. Motor: No weakness. Comments: Questionable leftward tongue deviation on protrusion. He is oriented x4. Subjective weakness and numbness. Has symmetric strength and no drift.          Diagnostic Study Results     Labs -  Recent Results (from the past 24 hour(s))   CBC WITH AUTOMATED DIFF    Collection Time: 06/01/21  9:01 AM   Result Value Ref Range    WBC 5.3 4.6 - 13.2 K/uL    RBC 4.33 (L) 4.35 - 5.65 M/uL    HGB 13.6 13.0 - 16.0 g/dL    HCT 40.5 36.0 - 48.0 %    MCV 93.5 74.0 - 97.0 FL    MCH 31.4 24.0 - 34.0 PG    MCHC 33.6 31.0 - 37.0 g/dL    RDW 12.1 11.6 - 14.5 %    PLATELET 311 567 - 621 K/uL    MPV 10.1 9.2 - 11.8 FL    NEUTROPHILS 63 40 - 73 %    LYMPHOCYTES 24 21 - 52 %    MONOCYTES 9 3 - 10 %    EOSINOPHILS 3 0 - 5 %    BASOPHILS 1 0 - 2 %    ABS. NEUTROPHILS 3.3 1.8 - 8.0 K/UL    ABS. LYMPHOCYTES 1.3 0.9 - 3.6 K/UL    ABS. MONOCYTES 0.5 0.05 - 1.2 K/UL    ABS. EOSINOPHILS 0.1 0.0 - 0.4 K/UL    ABS.  BASOPHILS 0.1 0.0 - 0.1 K/UL    DF AUTOMATED     METABOLIC PANEL, BASIC    Collection Time: 06/01/21  9:01 AM   Result Value Ref Range    Sodium 142 136 - 145 mmol/L    Potassium 3.9 3.5 - 5.5 mmol/L    Chloride 112 (H) 100 - 111 mmol/L    CO2 27 21 - 32 mmol/L    Anion gap 3 3.0 - 18 mmol/L    Glucose 99 74 - 99 mg/dL    BUN 21 (H) 7.0 - 18 MG/DL    Creatinine 0.86 0.6 - 1.3 MG/DL    BUN/Creatinine ratio 24 (H) 12 - 20      GFR est AA >60 >60 ml/min/1.73m2    GFR est non-AA >60 >60 ml/min/1.73m2    Calcium 9.3 8.5 - 10.1 MG/DL   TROPONIN I    Collection Time: 06/01/21  9:01 AM   Result Value Ref Range    Troponin-I, QT <0.02 0.0 - 0.045 NG/ML   PROTHROMBIN TIME + INR    Collection Time: 06/01/21  9:01 AM   Result Value Ref Range    Prothrombin time 13.3 11.5 - 15.2 sec    INR 1.0 0.8 - 1.2     TSH 3RD GENERATION    Collection Time: 06/01/21  9:01 AM   Result Value Ref Range    TSH 3.49 0.36 - 3.74 uIU/mL   EKG, 12 LEAD, INITIAL    Collection Time: 06/01/21 10:23 AM   Result Value Ref Range    Ventricular Rate 49 BPM    Atrial Rate 49 BPM    P-R Interval 176 ms    QRS Duration 98 ms    Q-T Interval 438 ms    QTC Calculation (Bezet) 395 ms    Calculated P Axis 48 degrees    Calculated R Axis -10 degrees    Calculated T Axis 27 degrees    Diagnosis       Sinus bradycardia with sinus arrhythmia  Incomplete right bundle branch block  Borderline ECG  No previous ECGs available     DUPLEX CAROTID BILATERAL    Collection Time: 06/01/21  2:11 PM   Result Value Ref Range    Right cca dist sys 69.9 cm/s    Right CCA dist webb 14.9 cm/s    RIGHT COMMON CAROTID ARTERY MID S 80.59 cm/s    RIGHT COMMON CAROTID ARTERY MID D 14.37 cm/s    Right CCA prox sys 119.4 cm/s    Right CCA prox webb 12.8 cm/s    Right Bulb PSV 90.4 cm/s    Right Bulb EDV 9.1 cm/s    Right ICA dist sys 106.5 cm/s    Right ICA dist webb 27.6 cm/s    Right ICA mid sys 95.6 cm/s    Right ICA mid webb 31.5 cm/s    Right ICA prox sys 77.1 cm/s    Right ICA prox webb 21.5 cm/s    Right vertebral sys 40.9 cm/s    RIGHT VERTEBRAL ARTERY D 8.00 cm/s    Right ICA/CCA sys 1.5     Left CCA dist sys 60.7 cm/s    Left CCA dist webb 15.4 cm/s    LEFT COMMON CAROTID ARTERY MID S 114.66 cm/s    LEFT COMMON CAROTID ARTERY MID D 17.60 cm/s    Left CCA prox sys 100.4 cm/s    Left CCA prox webb 18.9 cm/s    Left Bulb PSV 72.9 cm/s    Left Bulb EDV 15.4 cm/s    Left ICA dist sys 105.9 cm/s    Left ICA dist webb 31.8 cm/s    Left ICA mid sys 73.2 cm/s    Left ICA mid webb 24.1 cm/s    Left ICA prox sys 69.5 cm/s    Left ICA prox webb 20.0 cm/s    Left vertebral sys 50.0 cm/s    LEFT VERTEBRAL ARTERY D 21.20 cm/s    Left ICA/CCA sys 1.74     Right eca sys 85.0 cm/s    Right subclavian sys 79.4 cm/s    Left ECA sys 85.3 cm/s    Left subclavian sys 127.5 cm/s    RIGHT EXTERNAL CAROTID ARTERY D 4.7 cm/s    LEFT SUBCLAVIAN ARTERY D 0.0 cm/s    LEFT EXTERNAL CAROTID ARTERY D 5.0 cm/s    RIGHT SUBCLAVIAN ARTERY D 0.0 cm/s         Radiologic Studies -   MRI BRAIN WO CONT   Final Result         1. Acute right basal ganglia infarct. 2.  Brain is otherwise unremarkable. CT HEAD WO CONT   Final Result      No acute hemorrhage. Stable, unremarkable exam.      Findings called to Dr. Socorro Berg at 9:19 AM on 6/1/2021. Medical Decision Making   I am the first provider for this patient.     I reviewed the vital signs, available nursing notes, past medical history, past surgical history, family history and social history. Vital Signs-Reviewed the patient's vital signs. EKG: EKG interpretation of June 1, 2021 sinus bradycardia rate of 49 bpm, mild sinus rhythm is present, incomplete right bundle branch block. No ST elevation or depression. No pathologic Q waves. No T wave inversion. Overall sinus bradycardia with sinus arrhythmia and incomplete right bundle branch block. Otherwise no acute changes. Records Reviewed: Nursing Notes, Old Medical Records, Previous Radiology Studies and Previous Laboratory Studies (Time of Review: 8:54 AM)    ED Course: Progress Notes, Reevaluation, and Consults:  ED Course as of Jun 01 1603   Tue Jun 01, 2021   0848 First evaluation of the patient. [HANNAH]   0534 Case was discussed with teleneurology, they recommend a full dose aspirin, they recommend the patient be admitted for MRI and full stroke/TIA work-up. [HANNAH]      ED Course User Index  [HANNAH] Ly Gallardo DO         Provider Notes (Medical Decision Making):   MDM  Number of Diagnoses or Management Options  Stroke-like symptoms  Diagnosis management comments: 59-year-old male presenting with left-sided numbness as well as loss coordination of left upper extremity, on his arrival here his NIH is 0. Patient was made a stroke call down on arrival given unilateral symptoms. The differential diagnosis would be ischemic stroke, hemorrhagic stroke, TIA, peripheral neuropathy, electrolyte derangement, metabolic encephalopathy, etc.    Case was discussed with teleneurology, teleneurologist as recommended admission for a stroke work-up. Recommend MRI as well as risk modification. The patient was agreeable with the admission plan. Alteplase Exclusion Note    The inclusion/exclusion criteria for alteplase were reviewed with patient and/or decision maker at the bedside.  Patient is not candidate for alteplase because Symptom onset fall outside the therapeutic window or onset undetermined    DO Alia Ritchie DO 4:02 PM        Critical Care Time: CRITICAL CARE NOTE:    I have spent 50 minutes of critical care time involved in lab review, consultations with specialist, family decision-making, and documentation. During this entire length of time I was immediately available to the patient. Critical Care: The reason for providing this level of medical care for this critically ill patient was due a critical illness that impaired one or more vital organ systems such that there was a high probability of imminent or life threatening deterioration in the patients condition. This care involved high complexity decision making to assess, manipulate, and support vital system functions, to treat this vital organ system failure and to prevent further life threatening deterioration of the patients condition. Time is exclusive of procedural and teaching time. Alia Hartley DO      Core Measures:  For Hospitalized Patients:    1. Hospitalization Decision Time:  The decision to hospitalize the patient was made by Dr. Nilay Abdi at [phone] on 6/1/2021    2. Aspirin: Aspirin was given    4:02 PM  Patient is being admitted to the hospital by Dr. Hilda Price, Hospitalist. The results of their tests and reasons for their admission have been discussed with them and/or available family. They convey agreement and understanding for the need to be admitted and for their admission diagnosis. CONDITIONS ON ADMISSION:  Sepsis is not present at the time of admission. Deep Vein Thrombosis is not present at the time of admission. Thrombosis is not present at the time of admission. Urinary Tract Infection is not present at the time of admission. Pneumonia is not present at the time of admission. MRSA is not present at the time of admission. Wound infection is not present at the time of admission. Pressure Ulcer is not present at the time of admission. CLINICAL IMPRESSION:    1.  Stroke-like symptoms 2. Acute stroke due to ischemia ([facility] [address]    3. Dyslipidemia    4. Obstructive sleep apnea syndrome    5. Essential hypertension        Procedures    Diagnosis     Clinical Impression:   1. Stroke-like symptoms    2. Acute stroke due to ischemia ([facility] [address]    3. Dyslipidemia    4. Obstructive sleep apnea syndrome    5. Essential hypertension        Disposition: Admit    Follow-up Information    None          Patient's Medications   Start Taking    No medications on file   Continue Taking    No medications on file   These Medications have changed    No medications on file   Stop Taking    FLUTICASONE (FLONASE) 50 MCG/ACTUATION NASAL SPRAY    One to two sprays each nostril daily       Nathan Cuevas DO   Emergency Medicine   June 1, 2021, 8:54 AM     This note is dictated utilizing Dragon voice recognition software. Unfortunately this leads to occasional typographical errors using the voice recognition. I apologize in advance if the situation occurs. If questions occur please do not hesitate to contact me directly.     Nathan Cuevas, DO

## 2021-06-01 NOTE — Clinical Note
Patient Class[de-identified] OBSERVATION [104]   Type of Bed: Telemetry [19]   Cardiac Monitoring Required?: Yes   Reason for Observation: CVA/TIA   Admitting Diagnosis: Stroke-like symptom [[id number]]   Admitting Physician: Robert Brown [[id number]]   Attending Physician: Clydell Scheuermann

## 2021-06-02 ENCOUNTER — APPOINTMENT (OUTPATIENT)
Dept: GENERAL RADIOLOGY | Age: 61
DRG: 065 | End: 2021-06-02
Attending: FAMILY MEDICINE
Payer: COMMERCIAL

## 2021-06-02 ENCOUNTER — APPOINTMENT (OUTPATIENT)
Dept: NON INVASIVE DIAGNOSTICS | Age: 61
DRG: 065 | End: 2021-06-02
Attending: INTERNAL MEDICINE
Payer: COMMERCIAL

## 2021-06-02 VITALS
RESPIRATION RATE: 18 BRPM | TEMPERATURE: 97.6 F | HEIGHT: 73 IN | WEIGHT: 201 LBS | SYSTOLIC BLOOD PRESSURE: 160 MMHG | HEART RATE: 52 BPM | BODY MASS INDEX: 26.64 KG/M2 | OXYGEN SATURATION: 100 % | DIASTOLIC BLOOD PRESSURE: 94 MMHG

## 2021-06-02 LAB
ALBUMIN SERPL-MCNC: 3.5 G/DL (ref 3.4–5)
ALBUMIN/GLOB SERPL: 1.1 {RATIO} (ref 0.8–1.7)
ALP SERPL-CCNC: 62 U/L (ref 45–117)
ALT SERPL-CCNC: 41 U/L (ref 16–61)
ANION GAP SERPL CALC-SCNC: 5 MMOL/L (ref 3–18)
AST SERPL-CCNC: 36 U/L (ref 10–38)
BILIRUB SERPL-MCNC: 0.5 MG/DL (ref 0.2–1)
BUN SERPL-MCNC: 14 MG/DL (ref 7–18)
BUN/CREAT SERPL: 18 (ref 12–20)
CALCIUM SERPL-MCNC: 8.5 MG/DL (ref 8.5–10.1)
CHLORIDE SERPL-SCNC: 113 MMOL/L (ref 100–111)
CHOLEST SERPL-MCNC: 205 MG/DL
CO2 SERPL-SCNC: 24 MMOL/L (ref 21–32)
CREAT SERPL-MCNC: 0.78 MG/DL (ref 0.6–1.3)
ECHO AO ROOT DIAM: 4.31 CM
ECHO LA AREA 4C: 22.39 CM2
ECHO LA VOL 2C: 75.39 ML (ref 18–58)
ECHO LA VOL 4C: 63.27 ML (ref 18–58)
ECHO LA VOL BP: 74.54 ML (ref 18–58)
ECHO LA VOL/BSA BIPLANE: 34.51 ML/M2 (ref 16–28)
ECHO LA VOLUME INDEX A2C: 34.9 ML/M2 (ref 16–28)
ECHO LA VOLUME INDEX A4C: 29.29 ML/M2 (ref 16–28)
ECHO LV E' LATERAL VELOCITY: 9.95 CM/S
ECHO LV E' SEPTAL VELOCITY: 8.23 CM/S
ECHO LV INTERNAL DIMENSION DIASTOLIC: 4.73 CM (ref 4.2–5.9)
ECHO LV INTERNAL DIMENSION SYSTOLIC: 3.04 CM
ECHO LV IVSD: 1.22 CM (ref 0.6–1)
ECHO LV MASS 2D: 207.8 G (ref 88–224)
ECHO LV MASS INDEX 2D: 96.2 G/M2 (ref 49–115)
ECHO LV POSTERIOR WALL DIASTOLIC: 1.13 CM (ref 0.6–1)
ECHO LVOT CARDIAC OUTPUT: 5.39 LITER/MINUTE
ECHO LVOT DIAM: 2.4 CM
ECHO LVOT PEAK GRADIENT: 5.18 MMHG
ECHO LVOT PEAK VELOCITY: 113.79 CM/S
ECHO LVOT SV: 108.9 ML
ECHO LVOT VTI: 24.16 CM
ECHO MV A VELOCITY: 52.76 CM/S
ECHO MV E DECELERATION TIME (DT): 199.97 MS
ECHO MV E VELOCITY: 56.65 CM/S
ECHO MV E/A RATIO: 1.07
ECHO MV E/E' LATERAL: 5.69
ECHO MV E/E' RATIO (AVERAGED): 6.29
ECHO MV E/E' SEPTAL: 6.88
ECHO RV TAPSE: 3.34 CM (ref 1.5–2)
ERYTHROCYTE [DISTWIDTH] IN BLOOD BY AUTOMATED COUNT: 12.3 % (ref 11.6–14.5)
EST. AVERAGE GLUCOSE BLD GHB EST-MCNC: 117 MG/DL
GLOBULIN SER CALC-MCNC: 3.3 G/DL (ref 2–4)
GLUCOSE BLD STRIP.AUTO-MCNC: 86 MG/DL (ref 70–110)
GLUCOSE BLD STRIP.AUTO-MCNC: 92 MG/DL (ref 70–110)
GLUCOSE SERPL-MCNC: 88 MG/DL (ref 74–99)
HBA1C MFR BLD: 5.7 % (ref 4.2–5.6)
HCT VFR BLD AUTO: 38.6 % (ref 36–48)
HDLC SERPL-MCNC: 38 MG/DL (ref 40–60)
HDLC SERPL: 5.4 {RATIO} (ref 0–5)
HGB BLD-MCNC: 12.7 G/DL (ref 13–16)
LDLC SERPL CALC-MCNC: 144.4 MG/DL (ref 0–100)
LIPID PROFILE,FLP: ABNORMAL
LVOT MG: 2.33 MMHG
MCH RBC QN AUTO: 31.5 PG (ref 24–34)
MCHC RBC AUTO-ENTMCNC: 32.9 G/DL (ref 31–37)
MCV RBC AUTO: 95.8 FL (ref 74–97)
PLATELET # BLD AUTO: 226 K/UL (ref 135–420)
PMV BLD AUTO: 10.6 FL (ref 9.2–11.8)
POTASSIUM SERPL-SCNC: 4 MMOL/L (ref 3.5–5.5)
PROT SERPL-MCNC: 6.8 G/DL (ref 6.4–8.2)
RBC # BLD AUTO: 4.03 M/UL (ref 4.35–5.65)
SODIUM SERPL-SCNC: 142 MMOL/L (ref 136–145)
TRIGL SERPL-MCNC: 113 MG/DL (ref ?–150)
VLDLC SERPL CALC-MCNC: 22.6 MG/DL
WBC # BLD AUTO: 5.8 K/UL (ref 4.6–13.2)

## 2021-06-02 PROCEDURE — 92526 ORAL FUNCTION THERAPY: CPT

## 2021-06-02 PROCEDURE — 92522 EVALUATE SPEECH PRODUCTION: CPT

## 2021-06-02 PROCEDURE — 92610 EVALUATE SWALLOWING FUNCTION: CPT

## 2021-06-02 PROCEDURE — 97165 OT EVAL LOW COMPLEX 30 MIN: CPT

## 2021-06-02 PROCEDURE — 36415 COLL VENOUS BLD VENIPUNCTURE: CPT

## 2021-06-02 PROCEDURE — 74011250636 HC RX REV CODE- 250/636: Performed by: INTERNAL MEDICINE

## 2021-06-02 PROCEDURE — 74230 X-RAY XM SWLNG FUNCJ C+: CPT

## 2021-06-02 PROCEDURE — 82962 GLUCOSE BLOOD TEST: CPT

## 2021-06-02 PROCEDURE — 97161 PT EVAL LOW COMPLEX 20 MIN: CPT

## 2021-06-02 PROCEDURE — 85027 COMPLETE CBC AUTOMATED: CPT

## 2021-06-02 PROCEDURE — 92611 MOTION FLUOROSCOPY/SWALLOW: CPT

## 2021-06-02 PROCEDURE — 83036 HEMOGLOBIN GLYCOSYLATED A1C: CPT

## 2021-06-02 PROCEDURE — 74011250636 HC RX REV CODE- 250/636: Performed by: EMERGENCY MEDICINE

## 2021-06-02 PROCEDURE — 80053 COMPREHEN METABOLIC PANEL: CPT

## 2021-06-02 PROCEDURE — 80061 LIPID PANEL: CPT

## 2021-06-02 PROCEDURE — 99238 HOSP IP/OBS DSCHRG MGMT 30/<: CPT | Performed by: FAMILY MEDICINE

## 2021-06-02 PROCEDURE — 74011250637 HC RX REV CODE- 250/637: Performed by: INTERNAL MEDICINE

## 2021-06-02 PROCEDURE — 74011000250 HC RX REV CODE- 250: Performed by: FAMILY MEDICINE

## 2021-06-02 PROCEDURE — 93306 TTE W/DOPPLER COMPLETE: CPT

## 2021-06-02 PROCEDURE — 99233 SBSQ HOSP IP/OBS HIGH 50: CPT | Performed by: PSYCHIATRY & NEUROLOGY

## 2021-06-02 PROCEDURE — 2709999900 HC NON-CHARGEABLE SUPPLY

## 2021-06-02 PROCEDURE — 93306 TTE W/DOPPLER COMPLETE: CPT | Performed by: INTERNAL MEDICINE

## 2021-06-02 RX ORDER — CLOPIDOGREL BISULFATE 75 MG/1
75 TABLET ORAL DAILY
Qty: 21 TABLET | Refills: 0 | Status: SHIPPED | OUTPATIENT
Start: 2021-06-02 | End: 2021-09-28 | Stop reason: SDUPTHER

## 2021-06-02 RX ORDER — GUAIFENESIN 100 MG/5ML
81 LIQUID (ML) ORAL DAILY
Qty: 30 TABLET | Refills: 0 | Status: SHIPPED | OUTPATIENT
Start: 2021-06-03 | End: 2022-01-11

## 2021-06-02 RX ORDER — SODIUM CHLORIDE 9 MG/ML
10 INJECTION INTRAMUSCULAR; INTRAVENOUS; SUBCUTANEOUS
Status: COMPLETED | OUTPATIENT
Start: 2021-06-02 | End: 2021-06-02

## 2021-06-02 RX ORDER — ATORVASTATIN CALCIUM 80 MG/1
80 TABLET, FILM COATED ORAL
Qty: 30 TABLET | Refills: 0 | Status: SHIPPED | OUTPATIENT
Start: 2021-06-02 | End: 2022-01-11 | Stop reason: SDUPTHER

## 2021-06-02 RX ADMIN — SODIUM CHLORIDE 125 ML/HR: 900 INJECTION, SOLUTION INTRAVENOUS at 06:06

## 2021-06-02 RX ADMIN — BARIUM SULFATE 700 MG: 700 TABLET ORAL at 11:12

## 2021-06-02 RX ADMIN — ASPIRIN 81 MG: 81 TABLET, CHEWABLE ORAL at 09:09

## 2021-06-02 RX ADMIN — Medication 10 ML: at 06:06

## 2021-06-02 RX ADMIN — SODIUM CHLORIDE 10 ML: 9 INJECTION, SOLUTION INTRAMUSCULAR; INTRAVENOUS; SUBCUTANEOUS at 11:03

## 2021-06-02 RX ADMIN — BARIUM SULFATE 30 ML: 400 PASTE ORAL at 11:12

## 2021-06-02 RX ADMIN — BARIUM SULFATE 45 G: 960 POWDER, FOR SUSPENSION ORAL at 11:11

## 2021-06-02 RX ADMIN — HEPARIN SODIUM 5000 UNITS: 5000 INJECTION INTRAVENOUS; SUBCUTANEOUS at 12:04

## 2021-06-02 RX ADMIN — CLOPIDOGREL BISULFATE 75 MG: 75 TABLET ORAL at 16:23

## 2021-06-02 RX ADMIN — Medication 10 ML: at 13:10

## 2021-06-02 RX ADMIN — HEPARIN SODIUM 5000 UNITS: 5000 INJECTION INTRAVENOUS; SUBCUTANEOUS at 04:14

## 2021-06-02 RX ADMIN — FAMOTIDINE 20 MG: 20 TABLET ORAL at 16:23

## 2021-06-02 NOTE — TELEPHONE ENCOUNTER
Patient's daughter, Ousmane Davies, is calling stating the hospital advised them to contact his PCP for disability. She is looking for some guidance.

## 2021-06-02 NOTE — TELEPHONE ENCOUNTER
Will need to see final outcome of the stroke and his recovery  Will need to get input from neurologist usually  They can get the forms for us to fill out but I wont be able to fill it untiul he is seen after his diuscharge

## 2021-06-02 NOTE — PROGRESS NOTES
Problem: Dysphagia (Adult)  Goal: *Acute Goals and Plan of Care (Insert Text)  Description: Patient will:  1. Tolerate PO trials with 0 s/s overt distress in 4/5 trials  2. Utilize compensatory swallow strategies/maneuvers (decrease bite/sip, size/rate, alt. liq/sol) with min cues in 4/5 trials  3. Perform oral-motor/laryngeal exercises to increase oropharyngeal swallow function with min cues  4. Complete an objective swallow study (i.e., MBSS) to assess swallow integrity, r/o aspiration, and determine of safest LRD, min A as indicated/ordered by MD-met 6/2/21    Rec:     Reg solid with thin liquids  Aspiration/reflux precautions  HOB >45 during po intake, remain >30 for 30-45 minutes after po   Small bites/sips; alternate liquid/solid with slow feeding rate   Double swallow per bite/sip   Oral care TID  Meds per pt preference  Outpatient GI consult     Outcome: Progressing Towards Goal    SPEECH PATHOLOGY MODIFIED BARIUM SWALLOW STUDY & TREATMENT    Patient: Leandro Albert (45 y.o. male)  Date: 6/2/2021  Primary Diagnosis: Stroke-like symptom [R29.90]  Acute stroke due to ischemia [facility]) [I63.9]  Precautions: Aspiration, Fall    ASSESSMENT :  Based on the objective data described below, the patient presents with min oropharyngeal dysphagia. Pt tolerating all consistencies presented (thin liquid +/- straw, pudding, regular solid and 13 mm Ba pill with thin liquid wash) with positive airway protection noted across multiple trials. Deficits include mildly decreased base of tongue strength resulting in mild vallecular residue and decreased opening of upper esophageal sphincter resulting in mild-mod pyriform sinus residuals. Pt with associated globus sensation localized to the level of C5/C6. Pt able to minimally improve pharyngeal residue with cued double swallow and small bites/sips. Recommend continue current diet with use of the above mentioned compensatory strategies/aspiration precautions.   Further recommend outpatient GI to address suspected esophageal dysphagia. TREATMENT :  Treatment provided post diagnostic testing including oropharyngeal anatomy/physiology, MBS results, diet recommendations and compensatory strategies/positioning. Pt able to verbalize understanding. Will follow x1-2 visits. Patient will benefit from skilled intervention to address the above impairments. Patient's rehabilitation potential is considered to be Excellent   Factors which may influence rehabilitation potential include:   []              None noted  []              Mental ability/status  [x]              Medical condition  []              Home/family situation and support systems  []              Safety awareness  []              Pain tolerance/management  []              Other:      PLAN :  Recommendations and Planned Interventions:  As above   Frequency/Duration: Patient will be followed by speech-language pathology x1-2 visits to address goals.   Discharge Recommendations: Outpatient GI      SUBJECTIVE:   Patient stated I've had times where it gets stuck and I have to drink water to get it down    OBJECTIVE:     Past Medical History:   Diagnosis Date    Allergic rhinitis     Benign fundic gland polyps of stomach 09/15/2020    Dr Peng Figures    COVID-19 virus infection 01/2021    urgent care    Dyslipidemia     calculated 10 year risk score was 6.7%(2/14); 7.7% (3/15); 7.9% (2/15); 6.3% (3/17); 9.3% (4/18); declined statin repeatedly; declined ca score    KAVYA (obstructive sleep apnea)     Dr. Thorpe Ast    Overweight(278.02)     Zoster 1990s    right T10     Past Surgical History:   Procedure Laterality Date    HX COLONOSCOPY      Dr Carine Khan 7/16/10 neg; Dr Ginette Sheikh 9/15/20 neg    [place] CARDIAC SURG PROCEDURE UNLIST  11/08    echo nl lv, ef 55%    [place] CARDIAC SURG PROCEDURE UNLIST  2007    thallium negative     Prior Level of Function/Home Situation:   Home Situation  Home Environment: Private residence  # Steps to Enter: 3  One/Two Story Residence: Two story, live on 1st floor  # of Interior Steps: 12  Height of Each Step (in): 7 inches  Interior Rails: Right  Lift Chair Available: No  Living Alone: No  Support Systems: Spouse/Significant Other/Partner, Child(jv)  Patient Expects to be Discharged to[de-identified] Private residence  Current DME Used/Available at Home: CPAP, Blood pressure cuff  Diet prior to admission: regular/thin liquids   Current Diet:  regular/thin liquids    Radiologist:    Film Views: Lateral;Fluoro  Patient Position: 90 in chair    Trial 1:   Consistency Presented: Thin liquid;Pudding; Solid;Pill/Tablet   Bolus Acceptance: No impairment   Bolus Formation/Control: No impairment:     Propulsion: No impairment   Oral Residue: None   Initiation of Swallow: No impairment   Timing: No impairment   Penetration: None   Aspiration/Timing: No evidence of aspiration   Pharyngeal Clearance: Less than 10%; Vallecular residue;Pyriform residue    Attempted Modifications: Double swallow;Small sips and bites; Alternate liquids/solids   Effective Modifications: Small sips and bites; Double swallow   Cues for Modifications: Minimal         Decreased Tongue Base Retraction?: Yes  Laryngeal Elevation: WFL (within functional limits)  Aspiration/Penetration Score: 1 (No penetration or aspiration-Contrast does not enter the airway)  Pharyngeal-Esophageal Segment: Decreased relaxation of upper esophageal segment  Pharyngeal Dysfunction: Crico-pharyngeal dysfunction;Decreased tongue base retraction  Oral Phase Severity: Minimal  Pharyngeal Phase Severity: Minimal    8-point Penetration-Aspiration Scale: Score 1    PAIN:  Pt reports 0/10 pain or discomfort prior to MBS. Pt reports 0/10 pain or discomfort post MBS. COMMUNICATION/EDUCATION:   [x]  Patient educated regarding MBS results and diet recommendations. [x]  Patient/family have participated as able in goal setting and plan of care.   []  Patient/family agree to work toward stated goals and plan of care. []  Patient understands intent and goals of therapy, but is neutral about his/her participation. []  Patient is unable to participate in goal setting and plan of care.     Thank you for this referral,  Claudeen Stank, M.S., [id number] [facility]  Speech-Language Pathologist

## 2021-06-02 NOTE — PROGRESS NOTES
Problem: Pain  Goal: *Control of Pain  Outcome: Progressing Towards Goal     Problem: Patient Education: Go to Patient Education Activity  Goal: Patient/Family Education  Outcome: Progressing Towards Goal     Problem: Falls - Risk of  Goal: *Absence of Falls  Description: Document Devere Newbury Fall Risk and appropriate interventions in the flowsheet.   Outcome: Progressing Towards Goal  Note: Fall Risk Interventions:                                Problem: Patient Education: Go to Patient Education Activity  Goal: Patient/Family Education  Outcome: Progressing Towards Goal     Problem: TIA/CVA Stroke: 0-24 hours  Goal: Activity/Safety  Outcome: Progressing Towards Goal  Goal: Consults, if ordered  Outcome: Progressing Towards Goal  Goal: Diagnostic Test/Procedures  Outcome: Progressing Towards Goal  Goal: Nutrition/Diet  Outcome: Progressing Towards Goal  Goal: Discharge Planning  Outcome: Progressing Towards Goal  Goal: Medications  Outcome: Progressing Towards Goal  Goal: Respiratory  Outcome: Progressing Towards Goal  Goal: Treatments/Interventions/Procedures  Outcome: Progressing Towards Goal  Goal: Minimize risk of bleeding post-thrombolytic infusion  Outcome: Progressing Towards Goal  Goal: Monitor for complications post-thrombolytic infusion  Outcome: Progressing Towards Goal  Goal: Psychosocial  Outcome: Progressing Towards Goal  Goal: *Hemodynamically stable  Outcome: Progressing Towards Goal  Goal: *Neurologically stable  Description: Absence of additional neurological deficits    Outcome: Progressing Towards Goal  Goal: *Verbalizes anxiety and depression are reduced or absent  Outcome: Progressing Towards Goal  Goal: *Absence of Signs of Aspiration on Current Diet  Outcome: Progressing Towards Goal  Goal: *Absence of deep venous thrombosis signs and symptoms(Stroke Metric)  Outcome: Progressing Towards Goal  Goal: *Ability to perform ADLs and demonstrates progressive mobility and function  Outcome: Progressing Towards Goal  Goal: *Stroke education started(Stroke Metric)  Outcome: Progressing Towards Goal  Goal: *Dysphagia screen performed(Stroke Metric)  Outcome: Progressing Towards Goal  Goal: *Rehab consulted(Stroke Metric)  Outcome: Progressing Towards Goal

## 2021-06-02 NOTE — PROGRESS NOTES
ARU/IPR REFERRAL CONTACT NOTE  [address] [place] [address] for Physical Rehabilitation    RE: Leanna Moon   Thank you for the opportunity to review this patient's case for admission to [address] [place] [address] for Physical Rehabilitation. Based on our pre-admission screening:     [x ] This patient does not meet criteria for admission to [facility] for Physical      Rehabilitation due to:    [x ] Too functional, per documentation, patient does not require both Physical and Occupational Therapy Services at an acute rehabilitation level of intensity. Again, Thank you for this referral. Should you have any questions please do not hesitate to call. Sincerely,  Belkys Conway. Castillo Castillo, [address]  Castillo Castillo, RN  Admissions [facility] for Physical Rehabilitation  [phone]

## 2021-06-02 NOTE — PROGRESS NOTES
Reason for Admission:  Stroke-like symptom [R29.90]  Acute stroke due to ischemia ([facility] [address] [I63.9]                 RUR Score:    9            Plan for utilizing home health: If needed                      Likelihood of Readmission:   LOW                         Transition of Care Plan:              Initial assessment completed with patient and spouse/SO. Cognitive status of patient: oriented to time, place, person and situation. Face sheet information confirmed:  yes. The patient designates spouse to participate in his discharge plan and to receive any needed information. This patient lives in a single family home with patient and spouse. Patient is able to navigate steps as needed. Prior to hospitalization, patient was considered to be independent with ADLs/IADLS : yes . Patient has a current ACP document on file: no      Healthcare Decision Maker:     Click here to complete [address] including selection of the Healthcare Decision Maker Relationship (ie \"Primary\")    The patient and spouse will be available to transport patient home upon discharge. The patient already has none reported, and CPAP medical equipment available in the home. Patient is not currently active with home health. Patient has not stayed in a skilled nursing facility or rehab. This patient is on dialysis :no    List of available Home Health agencies were provided and reviewed with the patient prior to discharge. Freedom of choice signed: yes, for [facility]. Currently, the discharge plan is Home with [address] [place] [address] [facility]. The patient states that he can obtain his medications from the pharmacy, and take his medications as directed. Patient's current insurance is [employer]       Care Management Interventions  PCP Verified by CM:  Yes  Mode of Transport at Discharge: Self  Physical Therapy Consult: Yes  Occupational Therapy Consult: Yes  Speech Therapy Consult: Yes  Current Support Network: Lives with Spouse  Confirm Follow Up Transport: Family  The Plan for Transition of Care is Related to the Following Treatment Goals : ARU VS HH  The Patient and/or Patient Representative was Provided with a Choice of Provider and Agrees with the Discharge Plan?: Yes  Freedom of Choice List was Provided with Basic Dialogue that Supports the Patient's Individualized Plan of Care/Goals, Treatment Preferences and Shares the Quality Data Associated with the Providers?: Yes  Discharge Location  Discharge Placement: Rehab hospital/unit acute        Senthil Hoover RN BSN  Care Manager  [phone]

## 2021-06-02 NOTE — DISCHARGE INSTRUCTIONS
Patient Education        Learning About CPAP for Sleep Apnea  What is CPAP? CPAP is a small machine that you use at home every night while you sleep. It increases air pressure in your throat to keep your airway open. When you have sleep apnea, this can help you sleep better so you feel much better. CPAP stands for \"continuous positive airway pressure. \"  The CPAP machine will have one of the following:  · A mask that covers your nose and mouth  · Prongs that fit into your nose  · A mask that covers your nose only, which is the most common type. This type is called NCPAP. The N stands for \"nasal.\"  Why is it done? CPAP is usually the best treatment for obstructive sleep apnea. It is the first treatment choice and the most widely used. CPAP:  · Helps you have more normal sleep, so you feel less sleepy and more alert during the daytime. · May help keep heart failure or other heart problems from getting worse. · May help lower your blood pressure. If you use CPAP, your bed partner may also sleep better. That's because you aren't snoring or restless. Your doctor may suggest CPAP if you have:  · Moderate to severe sleep apnea. · Sleep apnea and coronary artery disease (CAD). · Sleep apnea and heart failure. What are the side effects? Some people who use CPAP have:  · A dry or stuffy nose and a sore throat. · Irritated skin on the face. · Sore eyes. · Bloating. How can you care for yourself? If using CPAP is not comfortable, or if you have certain side effects, work with your doctor to fix them. Here are some things you can try:  · Be sure the mask or nasal prongs fit well. · See if your doctor can adjust the pressure of your CPAP. · If your nose is dry, try a humidifier. · If your nose is runny or stuffy, try decongestant medicine or a steroid nasal spray. Be safe with medicines. Read and follow all instructions on the label. Do not use the medicine longer than the label says.   If these things don't help, you might try a different type of machine. Some machines have air pressure that adjusts on its own. Others have air pressures that are different when you breathe in than when you breathe out. This may reduce discomfort caused by too much pressure in your nose. Where can you learn more? Go to http://www.gray.com/  Enter Demond Valles in the search box to learn more about \"Learning About CPAP for Sleep Apnea. \"  Current as of: October 26, 2020               Content Version: 12.8  © 2006-2021 [employer]. Care instructions adapted under license by [employer] (which disclaims liability or warranty for this information). If you have questions about a medical condition or this instruction, always ask your healthcare professional. [address] any warranty or liability for your use of this information. Patient Education        Learning About Screening for Heart Attack and Stroke Risk  What is screening for heart attack and stroke risk? Screening for heart attack and stroke risk is a way for your doctor to check your chance of having a problem called atherosclerosis. This problem is also called hardening of the arteries. It is the starting point for most heart and blood flow problems, such as coronary artery disease, heart attack, stroke, and peripheral arterial disease. You and your doctor can use your risk score to decide if you want to take steps to lower your risk. How can you find out your risk? Your doctor looks at things that put you at risk for a heart attack and stroke. He or she might look at many things, such as:  · Your cholesterol levels. · Your blood pressure. · Your age. · Your race. · Whether you are male or female. · Whether or not you smoke. Your doctor might use a tool to calculate a risk score for you. There are different tools that doctors use. They may show that your risk is higher or lower than it really is. But the tools give you and your doctor a good idea about your risk. What happens after screening? Knowing your risk can help you and your doctor talk about whether to take steps to lower your risk. A heart-healthy lifestyle is important for everyone. Some people also take medicine to lower their risk. You and your doctor can work together to decide what is best for you. Where can you learn more? Go to http://www.gray.com/  Enter M928 in the search box to learn more about \"Learning About Screening for Heart Attack and Stroke Risk. \"  Current as of: August 31, 2020               Content Version: 12.8  © 2712-5748 [employer]. Care instructions adapted under license by [employer] (which disclaims liability or warranty for this information). If you have questions about a medical condition or this instruction, always ask your healthcare professional. [address] any warranty or liability for your use of this information. Patient Education        High Blood Pressure: Care Instructions  Overview     It's normal for blood pressure to go up and down throughout the day. But if it stays up, you have high blood pressure. Another name for high blood pressure is hypertension. Despite what a lot of people think, high blood pressure usually doesn't cause headaches or make you feel dizzy or lightheaded. It usually has no symptoms. But it does increase your risk of stroke, heart attack, and other problems. You and your doctor will talk about your risks of these problems based on your blood pressure. Your doctor will give you a goal for your blood pressure. Your goal will be based on your health and your age. Lifestyle changes, such as eating healthy and being active, are always important to help lower blood pressure. You might also take medicine to reach your blood pressure goal.  Follow-up care is a key part of your treatment and safety.  Be sure to make and go to all appointments, and call your doctor if you are having problems. It's also a good idea to know your test results and keep a list of the medicines you take. How can you care for yourself at home? Medical treatment  · If you stop taking your medicine, your blood pressure will go back up. You may take one or more types of medicine to lower your blood pressure. Be safe with medicines. Take your medicine exactly as prescribed. Call your doctor if you think you are having a problem with your medicine. · Talk to your doctor before you start taking aspirin every day. Aspirin can help certain people lower their risk of a heart attack or stroke. But taking aspirin isn't right for everyone, because it can cause serious bleeding. · See your doctor regularly. You may need to see the doctor more often at first or until your blood pressure comes down. · If you are taking blood pressure medicine, talk to your doctor before you take decongestants or anti-inflammatory medicine, such as ibuprofen. Some of these medicines can raise blood pressure. · Learn how to check your blood pressure at home. Lifestyle changes  · Stay at a healthy weight. This is especially important if you put on weight around the waist. Losing even 10 pounds can help you lower your blood pressure. · If your doctor recommends it, get more exercise. Walking is a good choice. Bit by bit, increase the amount you walk every day. Try for at least 30 minutes on most days of the week. You also may want to swim, bike, or do other activities. · Avoid or limit alcohol. Talk to your doctor about whether you can drink any alcohol. · Try to limit how much sodium you eat to less than 2,300 milligrams (mg) a day. Your doctor may ask you to try to eat less than 1,500 mg a day. · Eat plenty of fruits (such as bananas and oranges), vegetables, legumes, whole grains, and low-fat dairy products. · Lower the amount of saturated fat in your diet. Saturated fat is found in animal products such as milk, cheese, and meat. Limiting these foods may help you lose weight and also lower your risk for heart disease. · Do not smoke. Smoking increases your risk for heart attack and stroke. If you need help quitting, talk to your doctor about stop-smoking programs and medicines. These can increase your chances of quitting for good. When should you call for help? Call  911 anytime you think you may need emergency care. This may mean having symptoms that suggest that your blood pressure is causing a serious heart or blood vessel problem. Your blood pressure may be over 180/120. For example, call 911 if:    · You have symptoms of a heart attack. These may include:  ? Chest pain or pressure, or a strange feeling in the chest.  ? Sweating. ? Shortness of breath. ? Nausea or vomiting. ? Pain, pressure, or a strange feeling in the back, neck, jaw, or upper belly or in one or both shoulders or arms. ? Lightheadedness or sudden weakness. ? A fast or irregular heartbeat.     · You have symptoms of a stroke. These may include:  ? Sudden numbness, tingling, weakness, or loss of movement in your face, arm, or leg, especially on only one side of your body. ? Sudden vision changes. ? Sudden trouble speaking. ? Sudden confusion or trouble understanding simple statements. ? Sudden problems with walking or balance. ? A sudden, severe headache that is different from past headaches.     · You have severe back or belly pain. Do not wait until your blood pressure comes down on its own. Get help right away. Call your doctor now or seek immediate care if:    · Your blood pressure is much higher than normal (such as 180/120 or higher), but you don't have symptoms.     · You think high blood pressure is causing symptoms, such as:  ? Severe headache.  ? Blurry vision.    Watch closely for changes in your health, and be sure to contact your doctor if:    · Your blood pressure measures higher than your doctor recommends at least 2 times. That means the top number is higher or the bottom number is higher, or both.     · You think you may be having side effects from your blood pressure medicine. Where can you learn more? Go to http://www.gray.com/  Enter [id number] in the search box to learn more about \"High Blood Pressure: Care Instructions. \"  Current as of: August 31, 2020               Content Version: 12.8  © 2006-2021 [employer]. Care instructions adapted under license by [employer] (which disclaims liability or warranty for this information). If you have questions about a medical condition or this instruction, always ask your healthcare professional. [place] [address] any warranty or liability for your use of this information. Patient Education        Learning About Medicines That Lower Your Risk of Another Stroke  Introduction  After you have a stroke, going home may be hard, both for you and for your loved ones. There is a lot to think about. Remember to take one day at a time. An important part of your treatment will be to prevent another stroke. And a big part of that is taking medicines. Some of the medicines your doctor may have you take include:  · Aspirin or other blood thinners. They help prevent blood clots. Most strokes are caused by blood clots. · Statins and other medicines to lower cholesterol. High cholesterol raises your stroke risk. · Medicines for high blood pressure or diabetes. These conditions raise your stroke risk. All medicines can cause side effects. So it is important to understand the pros and cons of any medicine you take. It is also important to take your medicines exactly as your doctor tells you to. Be sure to tell your doctor if you take any other prescription medicine, over-the-counter medicine, vitamins, supplements, or herbal remedies.   Have a pill plan  You may have several pills to take every day. Having a plan for how you will remember to take them may help ease your fears and stress. To make a pill plan, write a list of your medicines. Then write down the details for each one. Include when you started using each medicine, when you take it, how much you take each time (number of pills and milligrams in each pill), and any side effects. Before you leave the hospital, be sure to ask questions if you don't understand or need help with your pill plan. And be sure you know who to call when you have questions at home. Blood thinners  One of the best things you can do to prevent another stroke is to take a medicine called a blood thinner. These medicines don't really thin your blood. They work by helping to prevent blood clots. Blood clots can cause a stroke if they block a blood vessel in the brain. So when you prevent blood clots, you help prevent a stroke. Antiplatelets are a type of blood thinner. They help keep platelets from sticking together and forming blood clots. (A platelet is a type of blood cell.)  Examples of antiplatelets include:  · Aspirin (Justyna, Bufferin, Ecotrin). · Aspirin combined with dipyridamole (Aggrenox). · Clopidogrel (Plavix). Another type of blood thinner, called an anticoagulant, may be used if you also have atrial fibrillation. This is a heart rhythm problem. It raises your risk of having a stroke. Be sure to learn how to take your medicine safely. Blood thinners can cause serious bleeding problems. Statins  Statins lower the amount of cholesterol in your blood. If you have too much cholesterol, it starts to build up in blood vessels. And that's how most heart and blood flow problems, including strokes, start. Statins also reduce inflammation around the cholesterol buildup. This may lower the risk that the buildup will break apart and cause a blood clot that can lead to a stroke.   Examples of statins include:  · Atorvastatin (Lipitor). · Lovastatin (Mevacor). · Pravastatin (Pravachol). · Simvastatin (Zocor). Blood pressure medicines  If you have high blood pressure, you may take medicines to lower it. High blood pressure damages blood vessels. Damaged vessels clog up more easily. And that can cause a stroke. If you were taking blood pressure pills before, your doctor may have you keep taking them. Or your doctor may have you take a different type. Blood pressure medicines may include:  · ACE (angiotensin-converting enzyme) inhibitors. · Angiotensin II receptor blockers (ARBs). · Beta-blockers. · Diuretics (water pills). · Calcium channel blockers. Follow-up care is a key part of your treatment and safety. Be sure to make and go to all appointments, and call your doctor if you are having problems. It's also a good idea to know your test results and keep a list of the medicines you take. Where can you learn more? Go to http://www.gray.com/  Enter F251 in the search box to learn more about \"Learning About Medicines That Lower Your Risk of Another Stroke. \"  Current as of: March 4, 2020               Content Version: 12.8  © 6256-4275 [employer]. Care instructions adapted under license by [employer] (which disclaims liability or warranty for this information). If you have questions about a medical condition or this instruction, always ask your healthcare professional. [place] [address] any warranty or liability for your use of this information. Patient Education        Learning About an Ischemic Stroke  What is an ischemic stroke? An ischemic (say \"gtv-UHT-wszy\") stroke occurs when a blood clot blocks a blood vessel in the brain. This means that blood cannot flow to some part of the brain. Without blood and the oxygen it carries, this part of the brain starts to die.  The part of the body controlled by the damaged area of the brain cannot work properly. This is different from a hemorrhagic (say \"cjz-wev-JI-vannesa\") stroke, which happens when a blood vessel in the brain has burst open or has started to leak. The brain damage from a stroke starts within minutes. Quick treatment can help limit damage to the brain and make recovery more likely. People who have had a stroke may have a hard time talking, understanding things, and making decisions. They may have to relearn daily activities, such as how to eat, bathe, and dress. How well someone recovers from a stroke depends on how quickly the person gets to the hospital, where in the brain the stroke happened, and how severe it was. Training and therapy also make a difference in how well people recover. What are the symptoms? If you have any of these symptoms, call 911 or other emergency services right away:   · You have symptoms of a stroke. These may include:  ? Sudden numbness, tingling, weakness, or loss of movement in your face, arm, or leg, especially on only one side of your body. ? Sudden vision changes. ? Sudden trouble speaking. ? Sudden confusion or trouble understanding simple statements. ? Sudden problems with walking or balance. ? A sudden, severe headache that is different from past headaches. See your doctor if you have symptoms that seem like a stroke, even if they go away quickly. You may have had a transient ischemic attack (TIA), sometimes called a mini-stroke. A TIA is a warning that a stroke may happen soon. Getting early treatment for a TIA can help prevent a stroke. What causes an ischemic stroke? An ischemic stroke is caused by a blood clot that blocks blood flow in the brain. The most common causes of blood clots include:  · Hardening of the arteries (atherosclerosis). This is caused by high blood pressure, diabetes, high cholesterol, or smoking. · Atrial fibrillation. How is ischemic stroke treated?   Emergency treatment may be done to restore blood flow to the brain using medicine or a procedure. You may take medicines to help prevent another stroke. Ask your doctor if a stroke rehab program is right for you. How can you prevent another stroke? · Work with your doctor to treat any health problems you have. High blood pressure, high cholesterol, atrial fibrillation, and diabetes all raise your chances of having a stroke. · Be safe with medicines. Take your medicine exactly as prescribed. Call your doctor if you think you are having a problem with your medicine. · Have a healthy lifestyle. ? Do not smoke or allow others to smoke around you. If you need help quitting, talk to your doctor about stop-smoking programs and medicines. These can increase your chances of quitting for good. Smoking makes a stroke more likely. ? Limit alcohol to 2 drinks a day for men and 1 drink a day for women. ? Lose weight if you need to. A healthy weight will help you keep your heart and body healthy. ? Be active. Ask your doctor what type and level of activity is safe for you.  ? Eat heart-healthy foods, like fruits, vegetables, and high-fiber foods. Follow-up care is a key part of your treatment and safety. Be sure to make and go to all appointments, and call your doctor if you are having problems. It's also a good idea to know your test results and keep a list of the medicines you take. Where can you learn more? Go to http://www.gray.com/  Enter D161 in the search box to learn more about \"Learning About an Ischemic Stroke. \"  Current as of: March 4, 2020               Content Version: 12.8  © 2006-2021 [employer]. Care instructions adapted under license by [employer] (which disclaims liability or warranty for this information). If you have questions about a medical condition or this instruction, always ask your healthcare professional. [address] any warranty or liability for your use of this information. Patient Education        Learning About Stroke Rehabilitation  What is stroke rehabilitation? Stroke rehabilitation (rehab) is training and therapy to help you relearn to do everyday things you have not been able to do since your stroke. The focus will depend on how the stroke has affected your ability to do the things you want and need to do. Rehab begins in the hospital. It starts as soon as you are able. You will have a team of doctors, nurses, and therapists to help you relearn daily activities. This can include eating, bathing, and dressing. You also may need help to learn how to walk or talk again. If the stroke damaged your memory, you will learn ways to improve it. You will get better faster if you begin rehab soon after the stroke. But even with rehab, you may not be able to do all the things you could before the stroke. You may recover the most in the first few weeks or months after your stroke. But you can keep getting better for years. It just may happen more slowly. And it may take a long time and a lot of hard work. Don't give up hope. After the hospital, you may go to a rehab facility or a nursing home for a while. Or you may go home. Wherever you go, keep working on your rehab and do a little every day. It's going to be important for you to get the support you need. Let your loved ones help you. Involve them in your treatment. Talk to others who have had a stroke, and find out how they handled ups and downs. How can stroke rehab specialists help? Your stroke rehab team will include doctors and nurses who specialize in stroke rehab, as well as other professionals. Each team member will help you in specific ways. The team may include the following professionals. Rehab doctor    A rehab doctor is a specialist in charge of your rehab program. He or she may also work on special problems, such as muscle cramps and spasms.   Rehab nurses    Rehab nurses can help you relearn basic activities of daily life. For example, they can help you learn how to: Take care of your health, including a schedule for medicine. Get from your bed to a wheelchair. Bathe. Control your bowels or bladder. Physical therapist    A stroke often takes away a person's ability to move in certain ways. A physical therapist helps you get back as much movement, balance, and coordination as possible. Physical therapy usually includes exercises. The exercises can help you get back your ability to walk and move as much as possible. It's important to practice these exercises over and over again. Your therapist may also help you learn to use a wheelchair or walker. And he or she may teach you how to use stairs safely. Occupational therapist    An occupational therapist helps you practice daily tasks like eating, bathing, dressing, and writing. For example, he or she may help you learn how to:  Prepare meals and clean your house. Drive your car. Use tools and devices that can help if you no longer have full use of both hands. For example, velcro can replace buttons on clothing. Get grab bars for your bathroom. Make your home safe if you have strength, balance, or vision problems. Speech therapist    A speech therapist can help you relearn how to talk or find new ways to express yourself. Swallowing is sometimes a problem after a stroke. This therapist can help you improve your ability to swallow. A speech therapist can also help you work on reading and writing skills. Psychologist or counselor    Emotions like fear, anxiety, anger, sadness, frustration, and grief are common after a stroke. A psychologist or counselor can help you deal with your emotions. They can also help you get treatment if you have depression. Vocational counselor    Stroke can leave you with disabilities that make it hard to do your job. A vocational counselor can help you return to your job or find a new one.  He or she can help you:  Identify your current skills and prepare a new resume. Search for a job. Understand the laws that protect disabled workers. Recreational therapist    A recreational therapist helps you return to doing things you enjoy. This may include the arts, hobbies, sports, or leisure activities. Follow-up care is a key part of your treatment and safety. Be sure to make and go to all appointments, and call your doctor if you are having problems. It's also a good idea to know your test results and keep a list of the medicines you take. Where can you learn more? Go to http://www.gray.com/  Enter T064 in the search box to learn more about \"Learning About Stroke Rehabilitation. \"  Current as of: March 4, 2020               Content Version: 12.8  © 2006-2021 [employer]. Care instructions adapted under license by [employer] (which disclaims liability or warranty for this information). If you have questions about a medical condition or this instruction, always ask your healthcare professional. [place] [address] any warranty or liability for your use of this information. Patient Education        Learning About Emotional Changes After a Stroke  Introduction  After a stroke, many people feel different without knowing why. For example, some people find it hard to control their emotions. They may cry or laugh for no reason. Or they may feel down or even hopeless. Some people may find they're acting differently. They may act too quickly or on impulse. Or they may be more anxious and hesitant at times. If these changes happen to you, they can be upsetting. And they can be confusing to you and your loved ones. But these changes may get better with time as your brain heals. Let your loved ones know what's happening. Their support and understanding can help you deal with these feelings.  And with time and support from the people around you, you can learn ways to adjust to life after a stroke. How can a stroke affect your emotions? After a stroke, some people feel like they have lost control of their emotions. These feelings can come from one or both of these two causes:  · A stroke can affect parts of the brain that control how you feel. · A stroke can leave you with upsetting body changes that take away some of your independence. For example, some people may feel:  · Sad or angry about the loss of the lifestyle they had before. · Isolated by speech and language problems. · Frustrated by the slow pace of recovery. · Worried about the future. These feelings are normal and expected. These strong feelings are more likely to happen if you need to stay in bed for long periods of time. And it is more likely to be a problem at night. It may help to have a radio playing softly in the bedroom or a dim light beside the bed. How can you deal with your emotions after a stroke? To deal with your emotions:  · Be easy on yourself. Let go of mistakes. · Give yourself credit for the progress you have made. · Make time for things that you enjoy. · Join a stroke support group. Your rehab team or local hospital can help you find one. Is depression common? It is common to feel sad about changes caused by the stroke. Sometimes the injury to the brain from the stroke can cause depression. If you think you might be depressed, tell your doctor right away. The sooner you know if you are depressed, the sooner you can get treatment. Treatment can help you feel better. Your doctor will want to know if in the past 2 weeks:  · You have lost interest or pleasure in doing things. · You have been feeling sad, hopeless, or empty. Your doctor may also ask about sleep troubles or changes in eating. How can friends and family help? Your loved ones can help you by following these tips:  · A person who has had a stroke may tend to have strong emotional reactions.  Remember that these are a result of the stroke. Try not to become too upset by them. · Don't avoid a loved one who's had a stroke. Contact with and support from family members is very important to recovery. · Watch for signs of depression in people who have had a stroke. Urge them to talk to their doctor if they think they might be depressed. Follow-up care is a key part of your treatment and safety. Be sure to make and go to all appointments, and call your doctor if you are having problems. It's also a good idea to know your test results and keep a list of the medicines you take. Where can you learn more? Go to http://www.gray.com/  Enter E171 in the search box to learn more about \"Learning About Emotional Changes After a Stroke. \"  Current as of: March 4, 2020               Content Version: 12.8  © 4863-9182 [employer]. Care instructions adapted under license by [employer] (which disclaims liability or warranty for this information). If you have questions about a medical condition or this instruction, always ask your healthcare professional. [place] [address] any warranty or liability for your use of this information. Patient Education        Learning About [facility] for Stroke  What is long-term care for stroke? Long-term care for stroke is care for people who are leaving the hospital after a stroke but who still need some medical care or other help while they work on getting better. Choosing the right type of long-term care is a very personal decision. It's important to look carefully at your options. You want to be sure that the level of care is right and that you will feel comfortable. Your doctor or other members of your medical team can help you find which type of long-term care would be best for you. What are the types of long-term care for stroke patients? Each type of care center offers a different level of care.  These centers may have shared or private rooms. An assisted living center or residential care center:   · Has a range of services. These may include meals, cleaning, and laundry. And they may offer help with personal needs like bathing, grooming, and dressing. · Often includes oversight by a nurse. You may be able to get help with basic care, such as getting medicines. A skilled nursing center:   · Offers nursing care up to 24 hours a day. · Provides meals and laundry. · Provides help with dressing, bathing, using the toilet, and other daily tasks. · Offers rehab therapy. A long-term acute care hospital:   · Is for stroke patients who have special medical problems. This may include things like chronic pain or not being able to breathe on your own. Follow-up care is a key part of your treatment and safety. Be sure to make and go to all appointments, and call your doctor if you are having problems. It's also a good idea to know your test results and keep a list of the medicines you take. Where can you learn more? Go to http://www.gray.com/  Enter N730 in the search box to learn more about \"Learning About Long-Term Care for Stroke. \"  Current as of: March 4, 2020               Content Version: 12.8  © 2006-2021 Healthwise[employer]. Care instructions adapted under license by [employer] (which disclaims liability or warranty for this information). If you have questions about a medical condition or this instruction, always ask your healthcare professional. [place] [address] any warranty or liability for your use of this information. Patient Education        Learning About How to Prevent a Stroke  What is a stroke? A stroke occurs when a blood vessel in the brain bursts or is blocked by a blood clot. Without blood and the oxygen it carries, part of the brain starts to die. The part of the body controlled by the damaged area of the brain can't work properly.   But there are many things you can do to help lower your stroke risk. What increases your risk for stroke? A risk factor is anything that makes you more likely to have a particular health problem. Risk factors for stroke that you can treat or change include:  · Health problems like high blood pressure, atrial fibrillation, diabetes, and high cholesterol. · Smoking. · Heavy use of alcohol. · Being overweight. · Not getting enough physical activity. Risk factors you can't change include:  · Age. The risk of stroke goes up as you get older. · Race.  Americans, Native Americans, and [place] Natives have a higher risk than those of other races. · Being female. Women have a higher risk of stroke than men. · Family history of stroke. Your doctor can help you know your risk. Then you and your can doctor talk about whether you need to lower it. What can you do to prevent a stroke? · Treat any health problems you have that raise your risk. · Adopt a heart-healthy lifestyle:  ? Don't smoke. If you need help quitting, talk to your doctor about stop-smoking programs and medicines. These can increase your chances of quitting for good. ? Limit alcohol to 2 drinks a day for men and 1 drink a day for women. ? Stay at a healthy weight. Lose weight if you need to.  ? If your doctor recommends it, get more exercise. Walking is a good choice. Bit by bit, increase the amount you walk every day. Try for at least 30 minutes on most days of the week. ? Eat heart-healthy foods. These include fruits, vegetables, high-fiber foods, and fish. Choose foods that are low in sodium, saturated fat, and trans fat. · Decide with your doctor whether you will also take medicines to help lower your risk. For example, you and your doctor may decide you will take a medicine that prevents blood clots. What are the symptoms of a stroke? Symptoms of a stroke happen quickly.  A stroke may cause:  · Sudden numbness, tingling, weakness, or loss of movement in your face, arm, or leg, especially on only one side of your body. · Sudden vision changes. · Sudden trouble speaking. · Sudden confusion or trouble understanding simple statements. · Sudden problems with walking or balance. · A sudden, severe headache that is different from past headaches. FAST is a simple way to remember the main symptoms of stroke. Recognizing these symptoms helps you know when to call for medical help. FAST stands for:  · F ace drooping. · A rm weakness. · S peech difficulty. · T jarvis to call 911. It's important to call for medical help if you have stroke symptoms. Quick treatment may save your life. And it may reduce the damage in your brain so that you have fewer problems after the stroke. Follow-up care is a key part of your treatment and safety. Be sure to make and go to all appointments, and call your doctor if you are having problems. It's also a good idea to know your test results and keep a list of the medicines you take. Where can you learn more? Go to http://www.gray.com/  Enter G757 in the search box to learn more about \"Learning About How to Prevent a Stroke. \"  Current as of: March 4, 2020               Content Version: 12.8  © 2006-2021 Healthwise, Incorporated. Care instructions adapted under license by [employer] (which disclaims liability or warranty for this information). If you have questions about a medical condition or this instruction, always ask your healthcare professional. [place] [address] any warranty or liability for your use of this information. Patient Education        Learning About How to Prevent Another Stroke  What can you do to prevent another stroke? After a stroke, people feel lots of different emotions. Some people are worried that they could have another stroke. Or they may feel overwhelmed by how much there is to learn and do. Some people feel sad or depressed.   No matter what emotions you are feeling, you can give yourself some control and peace of mind by making a plan to lower your risk of having another stroke. Take your medicines  You'll need to take medicines to help prevent another stroke. Be sure to take your medicines exactly as prescribed. And don't stop taking them unless your doctor tells you to. If you stop taking your medicines, you can increase your risk of having another stroke. Some of the medicines your doctor may prescribe include:  · Aspirin or some other blood thinner to prevent blood clots. · Statins and other medicines to lower cholesterol. · Blood pressure medicines to lower blood pressure. Manage other health problems  You can help lower your chance of having another stroke by managing certain other health problems. Problems that increase your risk of having another stroke include:  · High blood pressure. · High cholesterol. · Atrial fibrillation. · Diabetes. If you have any of these health problems, you can manage them with healthy lifestyle changes along with medicine. Adopt a healthy lifestyle  · Do not smoke or allow others to smoke around you. If you need help quitting, talk to your doctor about stop-smoking programs and medicines. These can increase your chances of quitting for good. Smoking makes a stroke more likely. · Limit alcohol to 2 drinks a day for men and 1 drink a day for women. · Lose weight if you need to. Controlling your weight will help you keep your heart and body healthy. · Be active. Ask your doctor what type and level of activity is safe for you. · Eat heart-healthy foods, like fruits, vegetables, and high-fiber foods. It's also important to:  · Get a flu shot every year. · Ask for help if you think you are depressed. Do stroke rehab  Taking part in a stroke rehabilitation (rehab) program will help you to regain skills you lost or make the most of your abilities after a stroke.  It also helps you take steps to prevent another stroke. Your rehab team will give you education and support to help you build new, healthy habits. You'll learn how to manage any other health problems that you might have. Ayden Powell also learn how to exercise safely, eat a healthy diet, and quit smoking if you smoke. Ayden Powell work with your team to decide what lifestyle choices are best for you. If your doctor hasn't already suggested it, ask him or her if stroke rehab is right for you. Know stroke symptoms  Make sure you know the symptoms of stroke. FAST is a simple way to remember. Recognizing these symptoms helps you know when to call for medical help. FAST stands for:  · F ace drooping. · A rm weakness. · S peech difficulty. · T jarvis to call 911. Follow-up care is a key part of your treatment and safety. Be sure to make and go to all appointments, and call your doctor if you are having problems. It's also a good idea to know your test results and keep a list of the medicines you take. Where can you learn more? Go to http://www.gray.com/  Enter K239 in the search box to learn more about \"Learning About How to Prevent Another Stroke. \"  Current as of: March 4, 2020               Content Version: 12.8  © 2006-2021 Healthwise, Incorporated. Care instructions adapted under license by [employer] (which disclaims liability or warranty for this information). If you have questions about a medical condition or this instruction, always ask your healthcare professional. [place] [address] any warranty or liability for your use of this information. Patient Education        Learning About Risk Factors for Stroke  What puts you at risk for having a stroke? Your chances of having a stroke depend on your risk factors. Some risks can be lowered by medicines and lifestyle changes. Others can't. This list includes some of the risk factors for having a stroke.  You and your doctor or nurse can use it to discuss your risk and how to lower it. By making a plan to lower your risk, you can give yourself some control and peace of mind. Risk factors you can control with medicines and lifestyle changes  High blood pressure. It pushes blood through the arteries with too much force. Over time, this damages the walls of the arteries. Atherosclerosis    This problem is also called hardening of the arteries. It happens when fatty deposits build up inside arteries. Diabetes. This means that there's a problem in your body that causes sugar to stay in your blood. You end up with high blood sugar. Over time, high blood sugar can lead to hardening of the arteries. High cholesterol. This can lead to the buildup of plaque in artery walls. Atrial fibrillation. This is also known as an irregular heartbeat. It increases the risk of blood clots that could cause a stroke. Risk factors you can control with lifestyle changes  Smoking. Smoking, or even inhaling secondhand smoke, increases your risk of heart attack and stroke. Being overweight. Being overweight makes it more likely you will develop high blood pressure, heart problems, and diabetes. These conditions make a stroke more likely. Drinking too much alcohol. This means more than 2 drinks a day for men and 1 drink a day for women. Not getting enough physical activity. If you aren't active, you have a higher risk of health conditions that make a stroke more likely. Risk factors you can't control  Age. The risk of stroke goes up as you get older. Being female. Women have a higher risk of stroke than men. Race.  Americans, Native Americans, and [place] Natives have a higher risk than those of other races. Family history of stroke. Your chances of having a stroke are higher if other people in your family have had one. Previous stroke or TIA. After you've had a stroke, you're at risk for another one. Where can you learn more?   Go to http://www.gray.com/  Enter R220 in the search box to learn more about \"Learning About Risk Factors for Stroke. \"  Current as of: March 4, 2020               Content Version: 12.8  © 2006-2021 Healthwise, [facility]. Care instructions adapted under license by [employer] (which disclaims liability or warranty for this information). If you have questions about a medical condition or this instruction, always ask your healthcare professional. [place] [address] any warranty or liability for your use of this information. Patient Education        Learning About FAST: Stroke Warning Signs  What is FAST? FAST is a simple way to remember the main symptoms of stroke. Recognizing these symptoms helps you know when to call for medical help. FAST stands for:  · F ace drooping. · A rm weakness. · S peech difficulty. · T jarvis to call 911. Other stroke symptoms can include sudden confusion, a severe headache, and problems with vision or balance. What happens when you have a stroke? A stroke occurs when a blood vessel to the brain bursts or is blocked by a blood clot. Within minutes, the nerve cells in that part of the brain die. As a result, the part of the body controlled by those cells cannot work properly. The effects of a stroke may range from mild to severe. They may get better, or they may last the rest of your life. A stroke can affect vision, speech, behavior, thought processes, and your ability to move. It can cause symptoms that may include:  · Sudden numbness, tingling, weakness, or loss of movement in your face, arm, or leg, especially on only one side of your body. · Sudden vision changes. · Sudden trouble speaking. · Sudden confusion or trouble understanding simple statements. · Sudden problems with walking or balance. · A sudden, severe headache that is different from past headaches. Why is it important to get help FAST?   Quick treatment may save your life. And it may reduce the damage in your brain so that you have fewer problems after the stroke. When you know stroke symptoms, you will know when it's important to call for medical help. Where can you learn more? Go to http://www.gray.com/  Enter W631 in the search box to learn more about \"Learning About FAST: Stroke Warning Signs. \"  Current as of: March 4, 2020               Content Version: 12.8  © 2006-2021 [employer]. Care instructions adapted under license by [employer] (which disclaims liability or warranty for this information). If you have questions about a medical condition or this instruction, always ask your healthcare professional. [place] [address] any warranty or liability for your use of this information. Patient Education        Learning About Thrombolytic Treatment for Stroke  What is thrombolytic treatment? Thrombolytics are medicines that dissolve blood clots. Most strokes are caused by a blood clot. This kind of stroke is called an ischemic (say \"emz-GUM-[id number]\") stroke. For an ischemic stroke, this medicine can dissolve the clot quickly and help blood to flow in a normal way again. This can help limit damage to the brain. The medicine is given through a vein in your hand or arm. It travels through the bloodstream to the clot. This treatment is most often done in the hospital.  Doctors try to give the medicine within 3 hours after stroke symptoms start. Some people may be helped if they are able to get this medicine within 4½ hours of their first symptoms. An example of this medicine is called TPA (tissue plasminogen activator). How does thrombolytic treatment help during a stroke? When a blood clot moves to a blood vessel in the brain, it blocks blood flow to that area and causes a stroke. Without blood and the oxygen it carries, that part of the brain starts to die.  If blood supply isn't restored, permanent damage usually occurs. The body parts controlled by those damaged cells can't work, or function, as they should. This loss of function may be mild or severe. It may be short-term or lifelong. Thrombolytic treatment can improve recovery from a stroke. Doctors try to give it as soon as possible after the stroke happens. It can limit brain damage from a stroke by dissolving the blood clot. Without medicine to dissolve it, a blood clot in your brain is more likely to cause serious brain damage. In general, the less damage there is to the brain tissue, the less disability a stroke causes. And with less damage to brain tissue, you are more likely to recover from the stroke. No treatment can guarantee a full recovery from a stroke. But this medicine does improve your chances of having less or no disability after a stroke. What are the risks of thrombolytic treatment? The main risk of thrombolytic treatment is that it can cause serious bleeding in the body. If bleeding happens in the brain, it can cause worse stroke symptoms or even death. Doctors are very careful about using this medicine. For example, a brain scan is done before treatment to make sure you have no signs of bleeding. (Thrombolytic treatment would make any bleeding worse.) And after treatment, you are closely watched for some time to make sure you have no internal bleeding. Is there another treatment choice? When your doctor tells you that thrombolytic treatment is a choice for you, it's because he or she believes that it offers you the best chance of recovery from stroke. But because this medicine also has risks, it is up to you to choose it or not. Based on your condition, your doctor will explain what other choices you have for treatment. They may include other medicines that stop the clot from getting bigger. With or without thrombolytic treatment, you will have care to help you get better.  And you'll have medicine to prevent blood clots and control symptoms. Having a stroke can make it hard to make quick and complex decisions. Feeling afraid or anxious can make it even harder to think clearly. Your hospital staff understands this. They will explain your choices and answer your questions. And they will help you decide about your treatment. Follow-up care is a key part of your treatment and safety. Be sure to make and go to all appointments, and call your doctor if you are having problems. It's also a good idea to know your test results and keep a list of the medicines you take. Where can you learn more? Go to http://www.gray.com/  Enter R910 in the search box to learn more about \"Learning About Thrombolytic Treatment for Stroke. \"  Current as of: August 31, 2020               Content Version: 12.8  © 9905-9678 Healthwise, Incorporated. Care instructions adapted under license by [employer] (which disclaims liability or warranty for this information). If you have questions about a medical condition or this instruction, always ask your healthcare professional. [address] any warranty or liability for your use of this information.

## 2021-06-02 NOTE — PROGRESS NOTES
Problem: Mobility Impaired (Adult and Pediatric)  Goal: *Acute Goals and Plan of Care (Insert Text)  Description: Physical Therapy Goals  Initiated 6/2/2021 and to be accomplished within 7 day(s)  1. Patient will move from supine to sit and sit to supine  in bed with modified independence. 2.  Patient will transfer from bed to chair and chair to bed with modified independence using the least restrictive device. 3.  Patient will perform sit to stand with modified independence. 4.  Patient will ambulate with modified independence for 300 feet with the least restrictive device. 5.  Patient will ascend/descend 3 stairs with B handrail(s) with modified independence. PLOF: Pt lives in a 2-story home set up on the first floor with 3 CHRISTY. He was indep and very active, working as a  PTA. Outcome: Progressing Towards Goal     PHYSICAL THERAPY EVALUATION    Patient: Terrell Lemons (1 y.o. male)  Date: 6/2/2021  Primary Diagnosis: Stroke-like symptom [R29.90]  Acute stroke due to ischemia [facility]) [I63.9]        Precautions:  Fall    ASSESSMENT :  Based on the objective data described below, the patient presents with unsteadiness with gait, left sided weakness, and decreased endurance. RN cleared for PT to work with pt. Pt found sitting up in recliner, in NAD, willing to work with PT, family at bedside. Finger-to-nose and heel-to-shin tests performed bilaterally, WFL. Grossly 4/5 strength on L UE/LE, 5/5 R UE/LE. He reports slight headache but no numbness/tingling. Pt stood with SBA and amb 200 ft out in hallways in back with CGA. Pt noted to have a slow gait with increased sway at times, able to correct his LOB himself. Pt returned back sitting up in recliner and was left with call bell and all needs met. Pt will benefit from 2-3 more PT sessions to improve steadiness with gait. Recommend d/c home with [facility] and increased supervision.      Patient will benefit from skilled intervention to address the above impairments. Patient's rehabilitation potential is considered to be Good  Factors which may influence rehabilitation potential include:   []         None noted  []         Mental ability/status  [x]         Medical condition  []         Home/family situation and support systems  [x]         Safety awareness  []         Pain tolerance/management  []         Other:      PLAN :  Recommendations and Planned Interventions:   [x]           Bed Mobility Training             [x]    Neuromuscular Re-Education  [x]           Transfer Training                   []    Orthotic/Prosthetic Training  [x]           Gait Training                          []    Modalities  [x]           Therapeutic Exercises           []    Edema Management/Control  [x]           Therapeutic Activities            [x]    Family Training/Education  [x]           Patient Education  []           Other (comment):    Frequency/Duration: Patient will be followed by physical therapy 1-2 times per day/4-7 days per week to address goals. Discharge Recommendations: Home Health with increased supervision  Further Equipment Recommendations for Discharge: Possibly SPC TBD on pt progress     SUBJECTIVE:   Patient stated my left leg feels a little off.     OBJECTIVE DATA SUMMARY:     Past Medical History:   Diagnosis Date    Allergic rhinitis     Benign fundic gland polyps of stomach 09/15/2020    Dr Berta Saldana    COVID-19 virus infection 01/2021    urgent care    Dyslipidemia     calculated 10 year risk score was 6.7%(2/14); 7.7% (3/15); 7.9% (2/15); 6.3% (3/17); 9.3% (4/18); declined statin repeatedly; declined ca score    KAVYA (obstructive sleep apnea)     Dr. Cali Doctor    Overweight(278.02)     [place] 1990s    right T10     Past Surgical History:   Procedure Laterality Date    HX COLONOSCOPY      Dr Baldo Lucia 7/16/10 neg; Dr Berta Saldana 9/15/20 neg    [place] CARDIAC SUR[place] PROCEDURE UNLIST  11/08    echo nl lv, ef 55%    [place] CARDIAC SUR[place] PROCEDURE UNLIST  2007    thallium negative     Barriers to Learning/Limitations: None  Compensate with: N/A  Home Situation:  Home Situation  Home Environment: Private residence  # Steps to Enter: 3  One/Two Story Residence: Two story, live on 1st floor  # of Interior Steps: 12  Height of Each Step (in): 7 inches  Interior Rails: Right  Lift Chair Available: No  Living Alone: No  Support Systems: Spouse/Significant Other/Partner, Child(jv)  Patient Expects to be Discharged to[de-identified] Private residence  Current DME Used/Available at Home: CPAP, Blood pressure cuff  Critical Behavior:  Neurologic State: Alert  Orientation Level: Oriented X4  Cognition: Follows commands  Safety/Judgement: Fall prevention; Insight into deficits    Strength:    Strength: Generally decreased, functional (L grossly 4/5, R L grossly 5/5)  Tone & Sensation:   Tone: Normal    Sensation: Intact     Range Of Motion:  AROM: Within functional limits     Transfers:  Sit to Stand: Stand-by assistance  Stand to Sit: Stand-by assistance    Balance:   Sitting: Intact; With support  Standing: Impaired; Without support  Standing - Static: Fair  Standing - Dynamic : Fair       Ambulation/Gait Training:  Distance (ft): 200 Feet (ft)  Assistive Device: Walker, rolling  Ambulation - Level of Assistance: Contact guard assistance        Gait Abnormalities: Trunk sway increased;Decreased step clearance        Base of Support: Center of gravity altered        Step Length: Left shortened;Right shortened    Pain:  Pain level pre-treatment: 0/10   Pain level post-treatment: 0/10   Pain Intervention(s) : Medication (see MAR); Rest, Repositioning  Response to intervention: Nurse notified, See doc flow    Activity Tolerance:   Pt tolerated mobility fair, slight unsteadiness in amb  Please refer to the flowsheet for vital signs taken during this treatment.   After treatment:   [x]         Patient left in no apparent distress sitting up in chair  []         Patient left in no apparent distress in bed  [x] Call bell left within reach  [x]         Nursing notified  []         Caregiver present  []         Bed alarm activated  []         SCDs applied    COMMUNICATION/EDUCATION:   [x]         Role of Physical Therapy in the acute care setting. [x]         Fall prevention education was provided and the patient/caregiver indicated understanding. [x]         Patient/family have participated as able in goal setting and plan of care. []         Patient/family agree to work toward stated goals and plan of care. []         Patient understands intent and goals of therapy, but is neutral about his/her participation. []         Patient is unable to participate in goal setting/plan of care: ongoing with therapy staff.  []         Other:     Thank you for this referral.  Fransico Porter   Time Calculation: 14 mins      Eval Complexity: History: LOW Complexity : Zero comorbidities / personal factors that will impact the outcome / POCExam:LOW Complexity : 1-2 Standardized tests and measures addressing body structure, function, activity limitation and / or participation in recreation  Presentation: LOW Complexity : Stable, uncomplicated  Clinical Decision Making:Low Complexity    Overall Complexity:LOW

## 2021-06-02 NOTE — PROGRESS NOTES
[facility] home care at [facility]. [facility] accepted referral.     Discharge order noted for today. Pt has been accepted to [facility]. Met with patient and spouse and are agreeable to the transition plan today. Transport has been arranged through spouse. Patient's discharge summary and home health  orders have been forwarded to [facility] via [place]. Updated bedside RN,,  to the transition plan.   Discharge information has been documented on the AVS.       Raudel Teague RN BSN  Care Manager  [phone]

## 2021-06-02 NOTE — PROGRESS NOTES
Problem: Motor Speech Impaired (Adult)  Goal: *Acute Goals and Plan of Care (Insert Text)  Description:     Motor Speech Goals:   1. Utilize compensatory strategies (decrease rate, overarticulate, increase intensity) to increase intelligibility to >90% at word level with mod A visual/verbal cues in 4/5 trials. 2. Perform oral motor exercises (with and without resistance) in therapy and at home to increase oral motor strength/range-of-motion for articulation tasks with mod A with visual/verbal cues in 4/5 trials. 3. Complete articulatory agility tasks (reading-conversation) with mod A with visual/verbal cues in 4/5 trials. 4. Demonstrate functional increase in articulation skills for effective participation in communication ADLs with mod A. May benefit from outpatient SLP services upon DC from this facility to address mild dysarthria      Outcome: Progressing Towards Goal     Speech LAnguage Pathology evaluation    Patient: Ayanna Seymour (75 y.o. male)  Date: 6/2/2021  Primary Diagnosis: Stroke-like symptom [R29.90]  Acute stroke due to ischemia [facility]) [I63.9]        Precautions: aspiration       ASSESSMENT :  Based on the objective data described below, the patient presents with mild dysarthria. Oral mech exam revealed slightly weakened left orofacial strength/ROM with min left droop. No c/o numbness. Speech intelligibility >90%; however, did note blurred word boundaries. Reviewed compensatory speech strategies including decreasing rate of speech and increasing loudness. Expressive/receptive language appeared to be functional. Pt communicated in sentences of appropriate form, content, and use. Will continue to follow for speech therapy. He may benefit from OP services upon DC from this facility. Patient will benefit from skilled intervention to address the above impairments.   Patients rehabilitation potential is considered to be Good  Factors which may influence rehabilitation potential include: []              None noted  []              Mental ability/status  [x]              Medical condition  []              Home/family situation and support systems  []              Safety awareness  []              Pain tolerance/management  []              Other:      PLAN :  Recommendations and Planned Interventions: See above  Frequency/Duration: Patient will be followed by speech-language pathology 1-2 times per day/2-3 days per week to address goals. Discharge Recommendations: Home Health, Outpatient, and To Be Determined     SUBJECTIVE:   Patient stated [facility] speech is a little slurred.     OBJECTIVE:     Past Medical History:   Diagnosis Date    Allergic rhinitis     Benign fundic gland polyps of stomach 09/15/2020    Dr Briggs Fruits    COVID-19 virus infection 01/2021    urgent care    Dyslipidemia     calculated 10 year risk score was 6.7%(2/14); 7.7% (3/15); 7.9% (2/15); 6.3% (3/17); 9.3% (4/18); declined statin repeatedly; declined ca score    KAVYA (obstructive sleep apnea)     Dr. Sammi Patel    Overweight(278.02)     [place] 1990s    right T10     Past Surgical History:   Procedure Laterality Date    HX COLONOSCOPY      Dr Irvin Lee 7/16/10 neg; Dr Brigitte Mccray 9/15/20 neg    [place] CARDIAC SURG PROCEDURE UNLIST  11/08    echo nl lv, ef 55%    [place] CARDIAC SURG PROCEDURE UNLIST  2007    thallium negative     Prior Level of Function/Home Situation: see below  Home Situation  Home Environment: Private residence  # Steps to Enter: 4  One/Two Story Residence: Two story  # of Interior Steps: 12  Height of Each Step (in): 7 inches  Interior Rails: Right  Lift Chair Available: No  Living Alone: No  Support Systems: Spouse/Significant Other/Partner, Family member(s)  Patient Expects to be Discharged to[de-identified] Private residence  Current DME Used/Available at Home: CPAP, Blood pressure cuff  Mental Status:  Neurologic State: Alert  Orientation Level: Oriented X4  Cognition: Follows commands  Motor Speech:  Oral-Motor Structure/Motor Speech  Labial: Left droop (very min)  Dentition: Natural;Intact  Oral Hygiene: adequate  Lingual: Decreased strength;Decreased rate  Velum: No impairment  Mandible: No impairment  Apraxic Characteristics: None  Dysarthric Characteristics: Blended word boundaries  Intelligibility: No impairment  Intonation: WFL  Rate: WFL  Prosody: WFL  Overall Impairment Severity: Mild  Language Comprehension and Expression: functional  Voice:  Vocal Quality: No impairment       The severity rating is based on the following outcomes:    Clinical judgment     PAIN:  Pt reports 0/10 pain or discomfort prior to evaluation. Pt reports 0/10 pain or discomfort post evaluation. After treatment:   []              Patient left in no apparent distress sitting up in chair  [x]              Patient left in no apparent distress in bed  [x]              Call bell left within reach  [x]              Nursing notified  []              Caregiver present  []              Bed alarm activated    COMMUNICATION/EDUCATION:   [x] Patient educated regarding dysarthria, role of SLP, and compensatory speech strategies. [x] Patient/family have participated as able in goal setting and plan of care. []  Patient/family agree to work toward stated goals and plan of care. []  Patient understands intent and goals of therapy, but is neutral about his/her participation. []  Patient is unable to participate in goal setting and plan of care.     Thank you for this referral.    Suki Nance M.S. CCC-SLP/L  Speech-Language Pathologist

## 2021-06-02 NOTE — PROGRESS NOTES
Problem: Dysphagia (Adult)  Goal: *Acute Goals and Plan of Care (Insert Text)  Description:     Patient will:  1. Tolerate PO trials with 0 s/s overt distress in 4/5 trials  2. Utilize compensatory swallow strategies/maneuvers (decrease bite/sip, size/rate, alt. liq/sol) with min cues in 4/5 trials  3. Perform oral-motor/laryngeal exercises to increase oropharyngeal swallow function with min cues  4. Complete an objective swallow study (i.e., MBSS) to assess swallow integrity, r/o aspiration, and determine of safest LRD, min A as indicated/ordered by MD     Rec:     Reg solid with thin liquids  Aspiration precautions  HOB >45 during po intake, remain >30 for 30-45 minutes after po   Small bites/sips; alternate liquid/solid with slow feeding rate   Oral care TID  Meds per pt preference  MBS to further assess oropharyngeal swallow fxn    Outcome: Progressing Towards Goal    SPEECH LANGUAGE PATHOLOGY BEDSIDE SWALLOW EVALUATION/TREATMENT    Patient: Bell Tang (24 y.o. male)  Date: 6/2/2021  Primary Diagnosis: Stroke-like symptom [R29.90]  Acute stroke due to ischemia [facility]) [I63.9]        Precautions: aspiration     PLOF: As per H&P    ASSESSMENT :  Based on the objective data described below, the patient presents with mild oropharyngeal dysphagia. Pt with pmhx of swallow deficits with globus sensation. Oral mech exam revealed slightly weakened left orofacial strength/ROM with min left droop. No c/o numbness. Pt tolerated reg solids and thin liquids without any overt s/sx of aspiration. Laryngeal elevation appeared functional to palpation. Mastication delayed/labored with positive oral clearance across all trials. Recommend regular solid diet with thin liquids, aspiration precautions, oral care TID, and meds as tolerated. Rec MBS to further assess oropharyngeal swallow fxn and r/o aspiration as pt does have a positive history of dysphagia. D/w pt and pt's family. ST will continue to follow.      Patient will benefit from skilled intervention to address the above impairments. Patient's rehabilitation potential is considered to be Good  Factors which may influence rehabilitation potential include:   []            None noted  []            Mental ability/status  [x]            Medical condition  []            Home/family situation and support systems  []            Safety awareness  []            Pain tolerance/management  []            Other:      PLAN :  Recommendations and Planned Interventions: See above  Frequency/Duration: Patient will be followed by speech-language pathology 1-2 times per day/4-7 days per week to address goals. Discharge Recommendations: Home Health, Outpatient and To Be Determined     SUBJECTIVE:   Patient stated I've seen people about swallowing before.     OBJECTIVE:     Past Medical History:   Diagnosis Date    Allergic rhinitis     Benign fundic gland polyps of stomach 09/15/2020    Dr Armand Hilton    COVID-19 virus infection 01/2021    urgent care    Dyslipidemia     calculated 10 year risk score was 6.7%(2/14); 7.7% (3/15); 7.9% (2/15); 6.3% (3/17); 9.3% (4/18); declined statin repeatedly; declined ca score    KAVYA (obstructive sleep apnea)     Dr. Ha Ma Overweight(278.02)     [place] 1990s    right T10     Past Surgical History:   Procedure Laterality Date    HX COLONOSCOPY      Dr Panda Corcoran 7/16/10 neg; Dr Armand Hilton 9/15/20 neg    [id number] [facility]  11/08    echo nl lv, ef 55%    [place] CARDIAC SUR[place] PROCEDURE UNLIST  2007    thallium negative     Prior Level of Function/Home Situation: see below  Home Situation  Home Environment: Private residence  # Steps to Enter: 4  One/Two Story Residence: Two story  # of Interior Steps: 12  Height of Each Step (in): 7 inches  Interior Rails: Right  Lift Chair Available: No  Living Alone: No  Support Systems: Spouse/Significant Other/Partner, Family member(s)  Patient Expects to be Discharged to[de-identified] Private residence  Current DME Used/Available at Home: CPAP, Blood pressure cuff    Diet prior to admission: reg solid with thin  Current Diet:  reg solid with thin      Cognitive and Communication Status:  Neurologic State: Alert  Orientation Level: Oriented X4  Cognition: Follows commands  Oral Assessment:  Oral Assessment  Labial: Left droop (very min)  Dentition: Natural;Intact  Oral Hygiene: adequate  Lingual: Decreased strength;Decreased rate  Velum: No impairment  Mandible: No impairment  P.O. Trials:  Patient Position: 90 in chair  Vocal quality prior to P.O.: No impairment  Consistency Presented: Thin liquid; Solid;Puree  How Presented: Self-fed/presented;Cup/sip;Spoon;Straw;Successive swallows  Bolus Acceptance: No impairment  Bolus Formation/Control: Impaired  Type of Impairment: Mastication  Propulsion: No impairment  Oral Residue: None  Initiation of Swallow: No impairment  Laryngeal Elevation: Functional  Aspiration Signs/Symptoms: None  Pharyngeal Phase Characteristics: Foreign body sensation; Suspected pharyngeal residue  Effective Modifications: Small sips and bites; Alternate liquids/solids  Cues for Modifications: Minimal    Oral Phase Severity: Mild  Pharyngeal Phase Severity : Mild    PAIN:  Start of Eval: 0  End of Eval: 0     After treatment:   []            Patient left in no apparent distress sitting up in chair  [x]            Patient left in no apparent distress in bed  [x]            Call bell left within reach  [x]            Nursing notified  []            Family present  []            Caregiver present  []            Bed alarm activated    COMMUNICATION/EDUCATION:   [x]            Aspiration precautions; swallow safety; compensatory techniques. [x]            Patient/family have participated as able in goal setting and plan of care. []            Patient/family agree to work toward stated goals and plan of care. []            Patient understands intent and goals of therapy; neutral about participation.   [] Patient unable to participate in goal setting/plan of care; educ ongoing with interdisciplinary staff  [x]         Posted safety precautions in patient's room.     Thank you for this referral.    Radha Ordaz M.S. CCC-SLP/L  Speech-Language Pathologist

## 2021-06-02 NOTE — PROGRESS NOTES
Neurology Progress Note    Patient ID:  Ga Sofia  [id number]  63 y.o.  1960    Subjective:      Patient was seen today as follow up for right  Basal ganglia stroke. residual deficits mild left  sided weakness/clumsiness. Patient  is on aspirin and Plavix for intracranial and extracranial atherosclerotic disease. Echo showed normal ejection fraction with mild concentric hypertrophy. Aortic root diameter = 4.5 cm.  and A1c 5. Family is concerned due to the fact that he works as a  and due to the stroke he will not be able to do that for 6 months. No other complaints today. Current Facility-Administered Medications   Medication Dose Route Frequency    atorvastatin (LIPITOR) tablet 80 mg  80 mg Oral QHS    labetaloL (NORMODYNE;TRANDATE) 20 mg/4 mL (5 mg/mL) injection 5 mg  5 mg IntraVENous Q10MIN PRN    heparin (porcine) injection 5,000 Units  5,000 Units SubCUTAneous Q8H    famotidine (PEPCID) tablet 20 mg  20 mg Oral QPM    sodium chloride (NS) flush 5-40 mL  5-40 mL IntraVENous Q8H    sodium chloride (NS) flush 5-40 mL  5-40 mL IntraVENous PRN    acetaminophen (TYLENOL) tablet 650 mg  650 mg Oral Q6H PRN    Or    acetaminophen (TYLENOL) suppository 650 mg  650 mg Rectal Q6H PRN    polyethylene glycol (MIRALAX) packet 17 g  17 g Oral DAILY PRN    promethazine (PHENERGAN) tablet 12.5 mg  12.5 mg Oral Q6H PRN    Or    ondansetron (ZOFRAN) injection 4 mg  4 mg IntraVENous Q6H PRN    clopidogreL (PLAVIX) tablet 75 mg  75 mg Oral DAILY    0.9% sodium chloride infusion  125 mL/hr IntraVENous CONTINUOUS    aspirin chewable tablet 81 mg  81 mg Oral DAILY          Objective: Active hospital medications were reviewed    Lab results and neuroradiology studies from the last 24 hours were reviewed.       Prior to Admission medications    Not on File     Patient Vitals for the past 8 hrs:   BP Temp Pulse Resp SpO2 Height Weight   06/02/21 1200 (!) 160/94 97.6 °F (36.4 °C) (!) 52 18 100 % -- --   06/02/21 1023 (!) 146/63 -- -- -- -- 6' 1\" (1.854 m) 91.2 kg (201 lb)   06/02/21 0800 (!) 146/63 97.4 °F (36.3 °C) (!) 47 16 98 % -- --   [id number]/31 1901 - 06/02 0700  In: 1977.5 [P.O.:240; I.V.:1737.5]  Out: 6779 [UDGUS:4573]  05/31 1901 - 06/02 0700  In: 1977.5 [P.O.:240; I.V.:1737.5]  Out: 8840 [Urine:1650]RESULTRCNT(24h)Active Problems:    Stroke-like symptom (6/1/2021)      Acute stroke due to ischemia [facility]) (6/1/2021)        Additional comments:I reviewed the patient's new clinical lab test results. General Exam  No acute distress, mucous membranes normal color and hydration status    Neurologic Exam    Mental status:  Alert, oriented to person, place, time and circumstance  Language: normal fluency and comprehension  No visual spatial neglect or overt apraxia  Cranial nerves: PERRL, Extraocular movements intact and full, face symmetric to movement, Tongue midline with normal strength, palat symmetric    Motor: strength 5/5 throughout with exception of left upper extremity clumsiness and weakness 4/5      Assessment:     Geno Mi is a 63y.o.-year-old man, with history of hyperlipidemia, KAVYA evaluated today as follow-up for right basal ganglia. He remained stable to some improvement of the left sided weakness/clumsiness.        Plan:     -Continue dual antiplatelets therapy ASA 81 mg + plavix 75 mg for a total of 2 months after that continue with aspirin to 325 mg because he has have a possible shoulder pain and extracranial atherosclerotic disease  -Lipitor 80 mg  -Follow-up with cardiology for mild concentric hypertrophy and dilatation of aorta at 4.5  -Follow-up PT/OT recommendations about discharge planning    Based on DMV recommendation unfortunately he should not drive for the next 6 months    Signed:  Marcus Wei MD  Adult Neurologist  6/2/2021  2:05 PM

## 2021-06-02 NOTE — PROGRESS NOTES
Problem: Falls - Risk of  Goal: *Absence of Falls  Description: Document Jerrell Ferguson Fall Risk and appropriate interventions in the flowsheet.   Outcome: Progressing Towards Goal  Note: Fall Risk Interventions:                 Elimination Interventions: Urinal in reach, Toileting schedule/hourly rounds, Toilet paper/wipes in reach, Stay With Me (per policy), Patient to call for help with toileting needs, Elevated toilet seat, Call light in reach              Problem: TIA/CVA Stroke: 0-24 hours  Goal: *Ability to perform ADLs and demonstrates progressive mobility and function  Outcome: Progressing Towards Goal     Problem: TIA/CVA Stroke: 0-24 hours  Goal: *Absence of Signs of Aspiration on Current Diet  Outcome: Progressing Towards Goal     Problem: TIA/CVA Stroke: 0-24 hours  Goal: *Hemodynamically stable  Outcome: Progressing Towards Goal

## 2021-06-02 NOTE — PROGRESS NOTES
OCCUPATIONAL THERAPY EVALUATION/DISCHARGE    Patient: Lakshmi Giordano (69 y.o. male)  Date: 6/2/2021  Primary Diagnosis: Stroke-like symptom [R29.90]  Acute stroke due to ischemia [facility]) [I63.9]        Precautions:   Fall  PLOF: Pt reports being independent in ADLs and functional mobility w/o AD. Pt also works full time as . ASSESSMENT AND RECOMMENDATIONS:  Based on the objective data described below, the patient presents with ability to perform basic ADLs and functional transfers at/near baseline level of function. Pt performed/simulated UB/LB ADLs with Mod Ind for seated and Supervision for std aspects. Pt demonstrates appropriate safety awareness during all standing tasks. Pt's LUE is slightly weaker than RUE (4/5 grossly), no sensory deficits identified, mildly decreased coordination in L hand, which is improved significantly per pt. Pt educated on incorporating LUE into all bilateral ADLs to maximize return in function. Pt lives with supportive family available to assist as needed. Stroke Education: Patient educated on signs/symptoms of stroke and importance of early intervention. He verbalized understanding. Pt and family also educated on driving regulations in [place] regarding return to driving following CVA, they verbalized understanding. Skilled occupational therapy in acute care is not indicated at this time. Discharge Recommendations: Outpatient if L hand symptoms persist.  Further Equipment Recommendations for Discharge: Shower chair     SUBJECTIVE:   Patient stated I will be okay. I will do whatever I need to prevent this from happening again.     OBJECTIVE DATA SUMMARY:     Past Medical History:   Diagnosis Date    Allergic rhinitis     Benign fundic gland polyps of stomach 09/15/2020    Dr Berta Saldana    COVID-19 virus infection 01/2021    urgent care    Dyslipidemia     calculated 10 year risk score was 6.7%(2/14); 7.7% (3/15); 7.9% (2/15); 6.3% (3/17); 9.3% (4/18); declined statin repeatedly; declined ca score    KAVYA (obstructive sleep apnea)     Dr. Ivory Zuñiga    Overweight(278.02)     Zoster 1990s    right T10     Past Surgical History:   Procedure Laterality Date    HX COLONOSCOPY      Dr Tony Hernandez 7/16/10 neg; Dr Precious Edward 9/15/20 neg    FL CARDIAC SURG PROCEDURE UNLIST  11/08    echo nl lv, ef 55%    [place] CARDIAC SURG PROCEDURE UNLIST  2007    thallium negative     Barriers to Learning/Limitations: None  Compensate with: visual, verbal, tactile, kinesthetic cues/model    Home Situation:   Home Situation  Home Environment: Private residence  # Steps to Enter: 3  One/Two Story Residence: Two story, live on 1st floor  # of Interior Steps: 12  Height of Each Step (in): 7 inches  Interior Rails: Right  Lift Chair Available: No  Living Alone: No  Support Systems: Spouse/Significant Other/Partner, Child(jv)  Patient Expects to be Discharged to[de-identified] Private residence  Current DME Used/Available at Home: CPAP, Blood pressure cuff  Tub or Shower Type: Shower  [x]     Right hand dominant   []     Left hand dominant    Cognitive/Behavioral Status:  Neurologic State: Alert  Orientation Level: Oriented X4  Cognition: Follows commands  Safety/Judgement: Awareness of environment; Fall prevention    Skin: visible skin intact  Edema: none noted    Vision/Perceptual:    Tracking: Able to track stimulus in all quadrants w/o difficulty       Diplopia: No    Acuity: Able to read clock/calendar on wall without difficulty     Coordination: BUE  Coordination: Generally decreased, functional  Fine Motor Skills-Upper: Left Intact; Right Intact    Gross Motor Skills-Upper: Left Intact; Right Intact    Balance:  Sitting: Intact  Standing: Impaired; Without support  Standing - Static: Good  Standing - Dynamic : Fair  Strength: BUE  Strength: Generally decreased, functional   Tone & Sensation: BUE  Tone: Normal  Sensation: Intact   Range of Motion: BUE  AROM: Within functional limits   Functional Mobility and Transfers for ADLs:  Bed Mobility:     Supine to Sit: Modified independent   Transfers:  Sit to Stand: Supervision  Stand to Sit: Supervision   Toilet Transfer : Supervision    Bathroom Mobility: Supervision/set up    ADL Assessment:  Feeding: Setup  Oral Facial Hygiene/Grooming: Setup  Bathing: Supervision  Upper Body Dressing: Setup  Lower Body Dressing: Supervision  Toileting: Supervision     ADL Intervention:     Cognitive Retraining  Safety/Judgement: Awareness of environment; Fall prevention  Pain:  Pain level pre-treatment: 0/10   Pain level post-treatment: 0/10     Activity Tolerance:   Good  Please refer to the flowsheet for vital signs taken during this treatment. After treatment:   [x]  Patient left in no apparent distress sitting up in chair  []  Patient left in no apparent distress in bed  [x]  Call bell left within reach  [x]  Nursing notified  [x]  Caregiver present  []  Bed alarm activated    COMMUNICATION/EDUCATION:   [x]      Role of Occupational Therapy in the acute care setting  [x]      Home safety education was provided and the patient/caregiver indicated understanding. [x]      Patient/family have participated as able and agree with findings and recommendations. []      Patient is unable to participate in plan of care at this time. Thank you for this referral.  Ayaz Lerma OTR/L  Time Calculation: 10 mins      Eval Complexity: History: LOW Complexity : Brief history review ; Examination: LOW Complexity : 1-3 performance deficits relating to physical, cognitive , or psychosocial skils that result in activity limitations and / or participation restrictions ;    Decision Making:LOW Complexity : No comorbidities that affect functional and no verbal or physical assistance needed to complete eval tasks

## 2021-06-02 NOTE — PROGRESS NOTES
Problem: Dysphagia (Adult)  Goal: *Acute Goals and Plan of Care (Insert Text)  Description:     Patient will:  1. Tolerate PO trials with 0 s/s overt distress in 4/5 trials  2. Utilize compensatory swallow strategies/maneuvers (decrease bite/sip, size/rate, alt. liq/sol) with min cues in 4/5 trials  3. Perform oral-motor/laryngeal exercises to increase oropharyngeal swallow function with min cues  4. Complete an objective swallow study (i.e., MBSS) to assess swallow integrity, r/o aspiration, and determine of safest LRD, min A as indicated/ordered by MD-met 6/2/21    Rec:     Reg solid with thin liquids  Aspiration/reflux precautions  HOB >45 during po intake, remain >30 for 30-45 minutes after po   Small bites/sips; alternate liquid/solid with slow feeding rate   Oral care TID  Meds per pt preference  Outpatient GI consult       6/2/2021 1441 by Melisa PASTRANA  Outcome: Progressing Towards Goal     SPEECH LANGUAGE PATHOLOGY DYSPHAGIA TREATMENT    Patient: Caryle Rick (10 y.o. male)  Date: 6/2/2021  Diagnosis: Stroke-like symptom [R29.90]  Acute stroke due to ischemia [facility]) [I63.9] <principal problem not specified>       Precautions: aspiration Fall  PLOF: As per H&P      ASSESSMENT:  Pt was seen at bedside with family present per pt request. Reviewed MBS results, recommendations for GI outpatient, diet recommendations, and role of SLP. Also reviewed safe swallowing techniques/strategies and aspiration precautions. Continue to rec reg solid with thin liquids, aspiration precautions, oral care TID, and meds as tolerated. ST to follow up x 1 more visit for dysphagia. Will continue to see for speech therapy.      Progression toward goals:  []         Improving appropriately and progressing toward goals  [x]         Improving slowly and progressing toward goals  []         Not making progress toward goals and plan of care will be adjusted     PLAN:  Recommendations and Planned Interventions: See above  Patient continues to benefit from skilled intervention to address the above impairments. Continue treatment per established plan of care. Discharge Recommendations:  Outpatient for speech     SUBJECTIVE:   Patient stated Thanks for talking to my wife. OBJECTIVE:   Cognitive and Communication Status:  Neurologic State: Alert  Orientation Level: Oriented X4  Cognition: Follows commands  Safety/Judgement: Fall prevention, Insight into deficits  Dysphagia Treatment:  Oral Assessment:  Oral Assessment  Labial: Left droop (very min)  Dentition: Natural, Intact  Oral Hygiene: adequate  Lingual: Decreased strength, Decreased rate  Velum: No impairment  Mandible: No impairment  P.O. Trials:   Patient Position: 90 in chair   Vocal quality prior to P.O.: No impairment   Consistency Presented:  Thin liquid, Solid, Puree   How Presented: Self-fed/presented, Cup/sip, Spoon, Straw, Successive swallows   Bolus Acceptance: No impairment   Bolus Formation/Control: Impaired   Type of Impairment: Mastication   Propulsion: No impairment   Oral Residue: None   Initiation of Swallow: No impairment   Laryngeal Elevation: Functional   Aspiration Signs/Symptoms: None   Pharyngeal Phase Characteristics: Foreign body sensation, Suspected pharyngeal residue   Effective Modifications: Small sips and bites, Alternate liquids/solids   Cues for Modifications: Minimal       Oral Phase Severity: Mild   Pharyngeal Phase Severity : Mild       PAIN:  Start of Tx: 0  End of Tx: 0     After treatment:   []              Patient left in no apparent distress sitting up in chair  [x]              Patient left in no apparent distress in bed  [x]              Call bell left within reach  [x]              Nursing notified  []              Family present  []              Caregiver present  []              Bed alarm activated    COMMUNICATION/EDUCATION:   [x] Aspiration precautions; swallow safety; compensatory techniques  [x]        Patient/family able to participate in training and education   []  Patient unable to participate in training and education, education ongoing with staff   [] Patient understands goals and intent of therapy; neutral about participation     Thank you for this referral.    Juan Reyes M.S. CCC-SLP/L  Speech-Language Pathologist

## 2021-06-02 NOTE — TELEPHONE ENCOUNTER
Pts wife aware of message below and verbalized understanding. She already scheduled a follow up appointment with Stephanie Rosales for 6/8/21 and the hospital is referring the patient to neurology and GI. No further questions or concerns from pt at this time.

## 2021-06-02 NOTE — ROUTINE PROCESS
1950: Assumed care. Quietly resting in bed. No SOB on RA. Denies any pain or discomfort at this time. Call light within reach. Wife at bedside. 2000: Assessment done. No visual disturbances. Active ROM on all extremities. Sensations intact. No edema. 2100: Due meds given. Daughter brought in CPAP machine from home. 2110: Medicated for pain per patient request.     2200: Refused his CPAP for now    2255: Wife called. Missed call. Called for update. 0000: No change from previous assessment. 0025: Bio-med technician at bedside. Device OK for use. 0200: Sleeping.     0400: No change from previous assessment. 8252: Due med given. 0600: Slept on & off thru night. Needs attended. 0725: Bedside and Verbal shift change report given to [facility] (oncoming nurse) by me (offgoing nurse).  Report included the following information SBAR, Kardex, Intake/Output, MAR, Recent Results and Cardiac Rhythm SB.

## 2021-06-02 NOTE — PROGRESS NOTES
ARU/IPR REFERRAL CONTACT NOTE  [address] [place] [address] for Physical Rehabilitation    RE: Yamileth Licona    Referral received to review this patient's case for admission to [address] [place] [address] Physical Rehabilitation. Current status reviewed.  When appropriate, will need PT/OT evaluation/treatment on this patient to complete the pre-admission evaluation.  Will continue to follow. Thank you for this referral.  Should you have any questions please do not hesitate to call. Sincerely,  Steve Cervantes.  [address] [place] [address] Physical Rehabilitation  [phone]

## 2021-06-04 LAB
LEFT BULB EDV: 15.4 CM/S
LEFT BULB PSV: 72.9 CM/S
LEFT CCA DIST DIAS: 15.4 CM/S
LEFT CCA DIST SYS: 60.7 CM/S
LEFT CCA MID DIAS: 17.6 CM/S
LEFT CCA MID SYS: 114.66 CM/S
LEFT CCA PROX DIAS: 18.9 CM/S
LEFT CCA PROX SYS: 100.4 CM/S
LEFT ECA DIAS: 5 CM/S
LEFT ECA SYS: 85.3 CM/S
LEFT ICA DIST DIAS: 31.8 CM/S
LEFT ICA DIST SYS: 105.9 CM/S
LEFT ICA MID DIAS: 24.1 CM/S
LEFT ICA MID SYS: 73.2 CM/S
LEFT ICA PROX DIAS: 20 CM/S
LEFT ICA PROX SYS: 69.5 CM/S
LEFT ICA/CCA SYS: 1.74
LEFT SUBCLAVIAN DIAS: 0 CM/S
LEFT SUBCLAVIAN SYS: 127.5 CM/S
LEFT VERTEBRAL DIAS: 21.2 CM/S
LEFT VERTEBRAL SYS: 50 CM/S
RIGHT BULB EDV: 9.1 CM/S
RIGHT BULB PSV: 90.4 CM/S
RIGHT CCA DIST DIAS: 14.9 CM/S
RIGHT CCA DIST SYS: 69.9 CM/S
RIGHT CCA MID DIAS: 14.37 CM/S
RIGHT CCA MID SYS: 80.59 CM/S
RIGHT CCA PROX DIAS: 12.8 CM/S
RIGHT CCA PROX SYS: 119.4 CM/S
RIGHT ECA DIAS: 4.7 CM/S
RIGHT ECA SYS: 85 CM/S
RIGHT ICA DIST DIAS: 27.6 CM/S
RIGHT ICA DIST SYS: 106.5 CM/S
RIGHT ICA MID DIAS: 31.5 CM/S
RIGHT ICA MID SYS: 95.6 CM/S
RIGHT ICA PROX DIAS: 21.5 CM/S
RIGHT ICA PROX SYS: 77.1 CM/S
RIGHT ICA/CCA SYS: 1.5
RIGHT SUBCLAVIAN DIAS: 0 CM/S
RIGHT SUBCLAVIAN SYS: 79.4 CM/S
RIGHT VERTEBRAL DIAS: 8 CM/S
RIGHT VERTEBRAL SYS: 40.9 CM/S

## 2021-06-08 ENCOUNTER — TELEPHONE (OUTPATIENT)
Dept: INTERNAL MEDICINE CLINIC | Age: 61
End: 2021-06-08

## 2021-06-08 ENCOUNTER — OFFICE VISIT (OUTPATIENT)
Dept: INTERNAL MEDICINE CLINIC | Age: 61
End: 2021-06-08
Payer: COMMERCIAL

## 2021-06-08 VITALS
RESPIRATION RATE: 14 BRPM | SYSTOLIC BLOOD PRESSURE: 137 MMHG | BODY MASS INDEX: 27.17 KG/M2 | WEIGHT: 205 LBS | DIASTOLIC BLOOD PRESSURE: 75 MMHG | HEIGHT: 73 IN | HEART RATE: 54 BPM | TEMPERATURE: 97.7 F | OXYGEN SATURATION: 98 %

## 2021-06-08 DIAGNOSIS — G47.33 OBSTRUCTIVE SLEEP APNEA SYNDROME: ICD-10-CM

## 2021-06-08 DIAGNOSIS — R73.02 IGT (IMPAIRED GLUCOSE TOLERANCE): ICD-10-CM

## 2021-06-08 DIAGNOSIS — I77.810 AORTIC ROOT DILATION (HCC): ICD-10-CM

## 2021-06-08 DIAGNOSIS — E78.5 DYSLIPIDEMIA: ICD-10-CM

## 2021-06-08 DIAGNOSIS — D64.9 ANEMIA, UNSPECIFIED TYPE: ICD-10-CM

## 2021-06-08 DIAGNOSIS — I63.9 CEREBROVASCULAR ACCIDENT (CVA), UNSPECIFIED MECHANISM (HCC): Primary | ICD-10-CM

## 2021-06-08 PROBLEM — R29.90 STROKE-LIKE SYMPTOM: Status: RESOLVED | Noted: 2021-06-01 | Resolved: 2021-06-08

## 2021-06-08 PROCEDURE — 99215 OFFICE O/P EST HI 40 MIN: CPT | Performed by: INTERNAL MEDICINE

## 2021-06-08 NOTE — PROGRESS NOTES
Filipe Guzman presents today for   Chief Complaint   Patient presents with   [facility] Follow-up     Left arm weakness/numbness    Extremity Weakness          Coordination of Care:  1. Have you been to the ER, urgent care clinic since your last visit? Hospitalized since your last visit? yes    2. Have you seen or consulted any other health care providers outside of the [address] [place] [address] since your last visit? Include any pap smears or colon screening.  yes

## 2021-06-08 NOTE — PROGRESS NOTES
ICD-10-CM ICD-9-CM    1. Cerebrovascular accident (CVA), unspecified mechanism ([facility] [address]  I63.9 434.91 REFERRAL TO CARDIOLOGY   2. Aortic root dilation (HCC)  I77.810 447.71 REFERRAL TO CARDIOLOGY   3. Anemia, unspecified type  D64.9 285.9 IRON PROFILE      VITAMIN B12 & FOLATE      PROTEIN ELECTROPHORESIS   4. Dyslipidemia  E78.5 272.4 CBC W/O DIFF      METABOLIC PANEL, COMPREHENSIVE      PSA SCREENING (SCREENING)      LIPID PANEL   5. IGT (impaired glucose tolerance)  R73.02 790.22 HEMOGLOBIN A1C WITH EAG   6. Obstructive sleep apnea syndrome  G47.33 327.23        Patient being seen today for transitional care management    Patient was admitted to [facility] from 6/1-2/21               I thoroughly reviewed the discharge summary, notes, consults, labs and imaging studies in the electronic record. Pertinent details are summarized below and the record has been updated to reflect recent events    He presented with left sided weakness and incoordination. Initial CT head neg but subsequent mri showed acute right basal ganglia infarct. He was started on aspirin, Plavix, Lipitor. Seen by Dr. Mindy Valdez. Carotids negative, echo showed mild aortic root dilation and mild LVH, normal EF and negative bubble study. He is supposed to follow-up with an outside neurologist but they cannot tell me the name. Dr. Mindy Valdez suggested cardiology evaluation as well but that has not been arranged yet. 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 He is continuing on PT and OT, still with some incoordination issues and getting frustrated. Denied Gi or  complaints.      The sleep apnea is controlled and energy level is good    The allergies are controlled at this time    Past Medical History:   Diagnosis Date    Allergic rhinitis     Benign fundic gland polyps of stomach 09/15/2020    Dr Corinne Brought    Carotid stenosis 06/2021    BICA<50%    COVID-19 virus infection 01/2021    urgent care  CVA (cerebral vascular accident) ([facility] [address] 06/01/2021    mri showed acute R basal ganglia infarct; manifesting as left sided weakness/incoordination; Dr Addison Fall    Dyslipidemia     calculated 10 year risk score was 6.7%(2/14); 7.7% (3/15); 7.9% (2/15); 6.3% (3/17); 9.3% (4/18); declined statin repeatedly; declined ca score    H/O echocardiogram 06/2021    echo showed ef 55%, mild cLVH, gr 1 dd, mod aor ropot dilation 4.5cm, mild LAE, neg bubble study    KAVYA (obstructive sleep apnea)     Dr. Lazar Cough Overweight(278.02)     Zoster 1990s    right T10     Past Surgical History:   Procedure Laterality Date    HX COLONOSCOPY      Dr Ayaz Keane 7/16/10 neg; Dr Jacinta Yu 9/15/20 neg    [place] CARDIAC SURG PROCEDURE UNLIST  11/08    echo nl lv, ef 55%    [place] CARDIAC SURG PROCEDURE UNLIST  2007    thallium negative     Social History     Socioeconomic History    Marital status:      Spouse name: Not on file    Number of children: 2    Years of education: Not on file    Highest education level: Not on file   Occupational History    Occupation:    Tobacco Use    Smoking status: Never Smoker    Smokeless tobacco: Never Used   Substance and Sexual Activity    Alcohol use: No    Drug use: No    Sexual activity: Not on file   Other Topics Concern    Not on file   Social History Narrative    Not on file     Social Determinants of Health     Financial Resource Strain:     Difficulty of Paying Living Expenses:    Food Insecurity:     Worried About Running Out of Food in the Last Year:     [address] in the Last Year:    Transportation Needs:     Lack of Transportation (Medical):      Lack of Transportation (Non-Medical):    Physical Activity:     Days of Exercise per Week:     Minutes of Exercise per Session:    Stress:     Feeling of Stress :    Social Connections:     Frequency of Communication with Friends and Family:     Frequency of Social Gatherings with Friends and Family:     Attends Religion Services:     Active Member of Clubs or Organizations:     Attends Club or Organization Meetings:     Marital Status:    Intimate Partner Violence:     Fear of Current or Ex-Partner:     Emotionally Abused:     Physically Abused:     Sexually Abused:       Current Outpatient Medications   Medication Sig    aspirin 81 mg chewable tablet Take 1 Tablet by mouth daily.  atorvastatin (LIPITOR) 80 mg tablet Take 1 Tablet by mouth nightly.  clopidogreL (PLAVIX) 75 mg tab Take 1 Tablet by mouth daily. No current facility-administered medications for this visit. Allergies   Allergen Reactions    Pcn [Penicillins] Shortness of Breath     REVIEW OF SYSTEMS: colo 9/20 Dr Peng Figures  Ophtho  no vision change or eye pain  Oral  no mouth pain, tongue or tooth problems  Ears  no hearing loss, ear pain, fullness, no swallowing problems  Cardiac  no CP, PND, orthopnea, edema, palpitations or syncope  Chest  no breast masses  Resp  no wheezing, chronic coughing, dyspnea  GI  no heartburn, nausea, vomiting, change in bowel habits, bleeding, hemorrhoids  Urinary  no dysuria, hematuria, flank pain, urgency, frequency    Visit Vitals  /75   Pulse (!) 54   Temp 97.7 °F (36.5 °C) (Temporal)   Resp 14   Ht 6' 1\" (1.854 m)   Wt 205 lb (93 kg)   SpO2 98%   BMI 27.05 kg/m²   A&O x3  Affect is appropriate. Mood stable  No apparent distress  Anicteric, no JVD, adenopathy or thyromegaly. No carotid bruits or radiated murmur  Lungs clear to auscultation, no wheezes or rales  Heart showed regular rate and rhythm. No murmur, rubs, gallops  Abdomen soft nontender, no hepatosplenomegaly or masses. Extremities without edema.   Pulses 1-2+ symmetrically    LABS  From 5/10 showed   gluc 97, cr 0.90, gfr>60,          wbc 5.5, hb 12.9, plt 219  From 12/10 showed gluc 89, cr 0.90,    alt 52,         chol 207, tg 104, hdl 41, ldl-c 145, wbc 5.8, hb 13.3, plt 295, psa 0.20  From 12/11 showed gluc 83, cr 0.82, gfr 102, alt 26, ldl-p 2121, chol 196, tg 163, hdl 36, ldl-c 139,  wbc 4.6, hb 13.0, plt 267, psa 0.30, tsh 3.51  From 7/12 showed       ldl-p 1614, chol 180, tg 92,   hdl 35, ldl-c 127  From 12/12 showed       ldl-p 1671, chol 159, tg 67,   hdl 41, ldl-c 105  From 2/14 showed   gluc 90, cr 0.90, gfr>60, alt 38,         chol 188, tg 86,   hdl 37, ldl-c 134, wbc 4.9, hb 13.0, plt 337, psa 0.27, ua neg  From 2/15 showed   gluc 92, cr 0.90, gfr>60, alt 28,         chol 197, tg 127, hdl 38, ldl-c 133, wbc 5.3, hb 12.9, plt 293, psa 0.20, ua neg  From 2/16 showed   gluc 85, cr 0.70, gfr>60, alt 43,         chol 198, tg 121, hdl 39, ldl-c 135, wbc 5.5, hb 13.3, plt 305, psa 0.30, ua neg, ast 44, alk phos 71, hep c neg  From 3/17 showed              chol 175, tg 85,   hdl 41, ldl-c 117  From 4/18 showed   gluc 93, cr 0.80, gfr>60, alt 34,         chol 202, tg 113, hdl 37, ldl-c 143, wbc 5.3, hb 12.7, plt 290, tsh 3.00  From 4/19 showed                         wbc 4.4, hb 12.4, plt 268, psa 0.19    Patient Active Problem List   Diagnosis Code    Sleep apnea Dr. Espana Erp G47.30    Dyslipidemia E78.5    Overweight with body mass index (BMI) of 25 to 25.9 in adult E66.3, Z68.25    Acute stroke due to ischemia (HCC) I63.9     ASSESSMENT AND PLAN:  1. Dyslipidemia. Previously declined statin but now on full dose lipitor, recheck in fall  2. Kvng on cpap. F/u Dr. Espana Erp   3. His forms will be filled out when the come in  4. S/p R BG cva. Asa and plavix x2 mos then asa 325 alone; f/u neurology as scheduled  5. Cardiology. Will send to Dr Awa Soria group for eval for possible monitoring for afib and also the dilated aortic root  6. Yearly flu, shingrix advocated  7. Anemia. Proceed with eval        RTC 9/21    Above conditions discussed at length and patient vocalized understanding.   All questions answered to patient satisfaction

## 2021-06-10 ENCOUNTER — TELEPHONE (OUTPATIENT)
Dept: INTERNAL MEDICINE CLINIC | Age: 61
End: 2021-06-10

## 2021-06-10 DIAGNOSIS — I63.9 ACUTE STROKE DUE TO ISCHEMIA (HCC): Primary | ICD-10-CM

## 2021-06-11 ENCOUNTER — TELEPHONE (OUTPATIENT)
Dept: INTERNAL MEDICINE CLINIC | Age: 61
End: 2021-06-11

## 2021-06-11 NOTE — TELEPHONE ENCOUNTER
Home health calling stating the patient does not need physical therapy. She said he is doing great with his strength and his balance.

## 2021-06-11 NOTE — TELEPHONE ENCOUNTER
Left detailed message for [facility] ok for speech therapy. Please send any orders to office for signature.

## 2021-06-16 ENCOUNTER — TELEPHONE (OUTPATIENT)
Dept: INTERNAL MEDICINE CLINIC | Age: 61
End: 2021-06-16

## 2021-06-16 NOTE — TELEPHONE ENCOUNTER
Pt's spouse, Dandy Quiroz calling. 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 I don't see any message in chart.   Please advise her at [phone]

## 2021-06-18 NOTE — TELEPHONE ENCOUNTER
Called wife and informed her FMLA form was done and ready to be picked up ( copy was made for scanning). She doesn't know exactly what diagnostic report employer is looking for and she is going to try and find out and will call us back next week.

## 2021-06-22 ENCOUNTER — TELEPHONE (OUTPATIENT)
Dept: INTERNAL MEDICINE CLINIC | Age: 61
End: 2021-06-22

## 2021-06-22 NOTE — DISCHARGE SUMMARY
Discharge Summary    Patient: Alan Gutierrez MRN: [id number]  CSN: [id number]    YOB: 1960  Age: 63 y.o. Sex: male    DOA: 6/1/2021 LOS:  LOS: 1 day   Discharge Date: 6/2/2021     Admission Diagnoses: Stroke-like symptom [R29.90]  Acute stroke due to ischemia [facility]) [I63.9]    Discharge Diagnoses:    Acute right basal ganglia CVA  Hypertension  Hyperlipidemia  KAVYA on home CPAP    Discharge Condition: Stable    PHYSICAL EXAM  Visit Vitals  BP (!) 160/94   Pulse (!) 52   Temp 97.6 °F (36.4 °C)   Resp 18   Ht 6' 1\" (1.854 m)   Wt 91.2 kg (201 lb)   SpO2 100%   BMI 26.52 kg/m²       General: In NAD. Nontoxic-appearing. HEENT: NCAT. Sclerae anicteric, EOMI. Lungs:  Clear, no wheezes. No accessory muscle use. Heart:  RRR. Abdomen: Soft, NT/ND. Extremities: Warm, no edema or ischemia. Psych:   Mood normal.  Neurologic:  Awake and alert, moves extremities spontaneously. Hospital Course:   See admission H&P for full details of HPI. Patient was referred for hospital admission after presenting to the emergency department for evaluation of left arm weakness and gait disturbance. CT scan of the head done in the ED was negative. MRI evaluation was ordered and neurology evaluation was obtained. Testing showed evidence of an acute right basal ganglia infarct. Usual medical management has been recommended. Physical and occupational therapy evaluations as well as speech evaluation obtained. Recommendation has been for dual antiplatelet therapy as directed with transition to aspirin 325 mg daily thereafter. Statin therapy has been continued as recommended. Recommendation for discharge has been home health care services which have been ordered. Patient is medically stable for discharge with outpatient follow-up as advised. Consults:   Neurology    Significant Diagnostic Studies/Procedures:   2D echo: Interpretation Summary    Result status: Final result   · LV: Estimated LVEF is 55 - 60%. Visually measured ejection fraction. Normal cavity size and systolic function (ejection fraction normal). Mild concentric hypertrophy. Wall motion: normal. Mild (grade 1) left ventricular diastolic dysfunction. · AO: Mild sinuses of Valsalva dilatation. Moderate aortic root dilatation. Aortic root diameter = 4.5 cm. · LA: Mildly dilated left atrium. Left Atrium volume index is 35 mL/m2. · IAS: No atrial septal defect present. Agitated saline contrast study was performed. · Saline contrast was given to evaluate for intracardiac shunt. CT head:  IMPRESSION     No acute hemorrhage. Stable, unremarkable exam.         Carotid PVL:  Interpretation Summary    Carotid Duplex Bilateral     1-49% Stenosis of the Internal Carotid Arteries. Antegrade flow of the Vertebral Arteries. Normal flow of the Subclavian Arteries. Cerebrovascular Findings    Right Carotid    There is intimal thickening present in the right CCA. Carotid bulb has intimal thickening plaque. There is mild stenosis in the right ICA (<50%) and at the proximal segment. The right ICA has mild and homogeneous plaque. There is no stenosis in the right ECA. The right vertebral is antegrade. There is no stenosis in the right vertebral artery. The right subclavian is normal.   Left Carotid    There is intimal thickening present in the left CCA. Carotid bulb has homogeneous plaque. There is mild stenosis in the left ICA (<50%) and at the proximal segment . The left ICA has mild and homogeneous plaque. There is no stenosis in the left ECA. The left vertebral is antegrade. There is no stenosis in the left vertebral artery. The left subclavian is normal.           MRI brain:  IMPRESSION        1. Acute right basal ganglia infarct.   2.  Brain is otherwise unremarkable.           Discharge Medications:     Discharge Medication List as of 6/2/2021  4:39 PM      START taking these medications    Details   aspirin 81 mg chewable tablet Take 1 Tablet by mouth daily. , Normal, Disp-30 Tablet, R-0      atorvastatin (LIPITOR) 80 mg tablet Take 1 Tablet by mouth nightly., Normal, Disp-30 Tablet, R-0      clopidogreL (PLAVIX) 75 mg tab Take 1 Tablet by mouth daily. , Normal, Disp-21 Tablet, R-0                 Activity: As tolerated. Diet: Cardiac Diet    Disposition: Home with home health care services. Follow-up: with PCP in 1 week and neurology as directed. Elvia Galeas MD  [address] [place] [address]    Disclaimer: Sections of this note are dictated using utilizing voice recognition software. Minor typographical errors may be present. If questions arise, please do not hesitate to contact me or call our department.

## 2021-06-29 ENCOUNTER — OFFICE VISIT (OUTPATIENT)
Dept: CARDIOLOGY CLINIC | Age: 61
End: 2021-06-29
Payer: COMMERCIAL

## 2021-06-29 VITALS
DIASTOLIC BLOOD PRESSURE: 72 MMHG | WEIGHT: 206 LBS | HEART RATE: 56 BPM | HEIGHT: 73 IN | SYSTOLIC BLOOD PRESSURE: 138 MMHG | OXYGEN SATURATION: 97 % | BODY MASS INDEX: 27.3 KG/M2

## 2021-06-29 DIAGNOSIS — I63.9 ACUTE STROKE DUE TO ISCHEMIA (HCC): Primary | ICD-10-CM

## 2021-06-29 DIAGNOSIS — G47.33 OBSTRUCTIVE SLEEP APNEA SYNDROME: ICD-10-CM

## 2021-06-29 DIAGNOSIS — I65.23 BILATERAL CAROTID ARTERY STENOSIS: ICD-10-CM

## 2021-06-29 DIAGNOSIS — E78.5 DYSLIPIDEMIA: ICD-10-CM

## 2021-06-29 DIAGNOSIS — I51.7 LVH (LEFT VENTRICULAR HYPERTROPHY): ICD-10-CM

## 2021-06-29 PROCEDURE — 99214 OFFICE O/P EST MOD 30 MIN: CPT | Performed by: INTERNAL MEDICINE

## 2021-06-29 PROCEDURE — 93000 ELECTROCARDIOGRAM COMPLETE: CPT | Performed by: INTERNAL MEDICINE

## 2021-06-29 NOTE — TELEPHONE ENCOUNTER
I dropped this off before I left on vacation   The morning I left for vacation 2 weeks ago - left it at the

## 2021-06-29 NOTE — PROGRESS NOTES
Priti Barrett presents today for   Chief Complaint   Patient presents with    New Patient     [facility], acute stroke       [employer] preferred language for health care discussion is english/other. Is someone accompanying this pt? Yes, wife    Is the patient using any DME equipment during [address]? no    Depression Screening:  3 most recent PHQ Screens 6/29/2021   Little interest or pleasure in doing things Not at all   Feeling down, depressed, irritable, or hopeless Not at all   Total Score PHQ 2 0       Learning Assessment:  Learning Assessment 6/29/2021   PRIMARY LEARNER Patient   HIGHEST LEVEL OF EDUCATION - PRIMARY LEARNER  -   BARRIERS PRIMARY LEARNER -   CO-LEARNER CAREGIVER -   PRIMARY LANGUAGE ENGLISH   LEARNER PREFERENCE PRIMARY DEMONSTRATION   ANSWERED BY patient   RELATIONSHIP SELF       Abuse Screening:  Abuse Screening Questionnaire 6/29/2021   Do you ever feel afraid of your partner? N   Are you in a relationship with someone who physically or mentally threatens you? N   Is it safe for you to go home? Y       Fall Risk  No flowsheet data found. Pt currently taking Anticoagulant therapy? Plavix 75mg    Coordination of Care:  1. Have you been to the ER, urgent care clinic since your last visit? Hospitalized since your last visit? yes    2. Have you seen or consulted any other health care providers outside of the [address] [place] [address] since your last visit? Include any pap smears or colon screening.  no

## 2021-06-29 NOTE — PATIENT INSTRUCTIONS
Follow up with Dr. Lucrecia Zamora in 3 months  Monitor blood pressure - take blood pressure 3 times a week in the morning and evening and keep record. Bring readings to appointment.

## 2021-06-29 NOTE — TELEPHONE ENCOUNTER
The patient picked up that form. This is another one that was dropped off last week.  I have put another copy in your box

## 2021-07-02 ENCOUNTER — TELEPHONE (OUTPATIENT)
Dept: INTERNAL MEDICINE CLINIC | Age: 61
End: 2021-07-02

## 2021-07-02 NOTE — TELEPHONE ENCOUNTER
Patients wife calling-- wants to if her  should continue use of the medication he was prescribed after his stroke.  She stated if so, he needs a refill

## 2021-07-06 NOTE — TELEPHONE ENCOUNTER
Called [phone] to reach [facility], just gives busy signal, unable to leave message, will try again later.

## 2021-07-06 NOTE — TELEPHONE ENCOUNTER
Spoke with Bianka Morales, informed her that Peter Cedillo is to continue his current medications. She stated that she is able to get the Aspirin 81 mg OTC, but needed the atorvastatin and plavix refilled, medications pended.

## 2021-07-07 RX ORDER — ATORVASTATIN CALCIUM 80 MG/1
80 TABLET, FILM COATED ORAL DAILY
Qty: 90 TABLET | Refills: 1 | Status: SHIPPED | OUTPATIENT
Start: 2021-07-07 | End: 2021-09-28 | Stop reason: SDUPTHER

## 2021-07-07 RX ORDER — CLOPIDOGREL BISULFATE 75 MG/1
75 TABLET ORAL DAILY
Qty: 90 TABLET | Refills: 1 | Status: SHIPPED | OUTPATIENT
Start: 2021-07-07 | End: 2022-01-03

## 2021-07-09 PROBLEM — I51.7 LVH (LEFT VENTRICULAR HYPERTROPHY): Status: ACTIVE | Noted: 2021-07-09

## 2021-07-09 PROBLEM — I65.23 BILATERAL CAROTID ARTERY STENOSIS: Status: ACTIVE | Noted: 2021-07-09

## 2021-07-09 NOTE — PROGRESS NOTES
Subjective:      Kervin Whitt is in the office today for cardiac evaluation. He was recently hospitalized at [facility] for an acute stroke.  (6/1/2021). Evaluation remarkable for acute right basal ganglia infarct. Echocardiogram was done with findings of normal systolic function and mild LVH. EF was 55 to 60%. There was grade 1 left ventricular diastolic dysfunction. Additionally, there was an enlarged aortic root at 4.5 cm. The patient's symptoms at the time of his presentation were left arm numbness and a speech disturbance with the feeling that his left arm would not take commands from his brain. He has had no cardiac history. He has had no chest pain. He has had no shortness of breath. His stroke symptoms have gradually improved. Patient's cardiac risk factors are dyslipidemia, hypertension. Patient Active Problem List    Diagnosis Date Noted    LVH (left ventricular hypertrophy) 07/09/2021    Bilateral carotid artery stenosis 07/09/2021    Acute stroke due to ischemia ([address] 06/01/2021    Overweight with body mass index (BMI) of 25 to 25.9 in adult 03/13/2017    Dyslipidemia 02/04/2014    Sleep apnea Dr. Lula Agosto      Current Outpatient Medications   Medication Sig Dispense Refill    aspirin 81 mg chewable tablet Take 1 Tablet by mouth daily. 30 Tablet 0    atorvastatin (LIPITOR) 80 mg tablet Take 1 Tablet by mouth nightly. 30 Tablet 0    clopidogreL (PLAVIX) 75 mg tab Take 1 Tablet by mouth daily. 21 Tablet 0    atorvastatin (LIPITOR) 80 mg tablet Take 1 Tablet by mouth daily. 90 Tablet 1    clopidogreL (PLAVIX) 75 mg tab Take 1 Tablet by mouth daily for 180 days.  90 Tablet 1     Allergies   Allergen Reactions    Pcn [Penicillins] Shortness of Breath     Past Medical History:   Diagnosis Date    Allergic rhinitis     Benign fundic gland polyps of stomach 09/15/2020    Dr Tobias Moran    Carotid stenosis 06/2021    BICA<50%    COVID-19 virus infection 01/2021    urgent care    CVA (cerebral vascular accident) ([facility] [address] 06/01/2021    mri showed acute R basal ganglia infarct; manifesting as left sided weakness/incoordination; Dr Gary Aranda Dyslipidemia     calculated 10 year risk score was 6.7%(2/14); 7.7% (3/15); 7.9% (2/15); 6.3% (3/17); 9.3% (4/18); declined statin repeatedly; declined ca score    H/O echocardiogram 06/2021    echo showed ef 55%, mild cLVH, gr 1 dd, mod aor ropot dilation 4.5cm, mild LAE, neg bubble study    KAVYA (obstructive sleep apnea)     Dr. Sae Warner Overweight(278.02)     Zoster 1990s    right T10     Past Surgical History:   Procedure Laterality Date    HX COLONOSCOPY      Dr David Kruger 7/16/10 neg; Dr Akin Murrieta 9/15/20 neg    Joan  11/08    echo nl lv, ef 55%    CT CARDIAC SURG PROCEDURE UNLIST  2007    thallium negative     No family history on file. Social History     Tobacco Use   Smoking Status Never Smoker   Smokeless Tobacco Never Used          Review of Systems, additional:  Constitutional: negative  Eyes: negative  Respiratory: negative  Cardiovascular: negative  Gastrointestinal: negative  Musculoskeletal:negative  Neurological: negative  Behvioral/Psych: negative  Endocrine: negative  ENT: negative    Objective:     Visit Vitals  /72 (BP 1 Location: Left upper arm, BP Patient Position: Sitting, BP Cuff Size: Adult)   Pulse (!) 56   Ht 6' 1\" (1.854 m)   Wt 93.4 kg (206 lb)   SpO2 97%   BMI 27.18 kg/m²     General:  alert, cooperative, no distress   Chest Wall: inspection normal - no chest wall deformities or tenderness, respiratory effort normal   Lung: clear to auscultation bilaterally   Heart:  normal rate and regular rhythm, S1 and S2 normal, no murmurs noted, no gallops noted, no JVD   Abdomen: soft, non-tender.  Bowel sounds normal. No masses,  no organomegaly   Extremities: extremities normal, atraumatic, no cyanosis or edema Skin: no rashes   Neuro: alert, oriented, normal speech, no focal findings or movement disorder noted EKG:/29/21. Sinus bradycardia. RSR V1. Lateral Q waves noted    Assessment/Plan:       ICD-10-CM ICD-9-CM    1. Acute stroke due to ischemia ([facility] [address] 6/1/2021. Acute right basal ganglia CVA. Patient on Plavix and aspirin 81 mg as well as atorvastatin. Return in 3 months. Keep a blood pressure log. I63.9 434.91 AMB POC EKG ROUTINE W/ 12 LEADS, INTER & REP   2. Obstructive sleep apnea syndrome , on CPAP G47.33 327.23    3. Dyslipidemia , total cholesterol 205, triglycerides 113, HDL 38, .4, VLDL 22.6.  (6/2/2021) E78.5 272.4    4. LVH (left ventricular hypertrophy) , mild by echo 06/2021 I51.7 429.3    5. Bilateral carotid artery stenosis, mild bilateral ICA stenoses I65.23 433.10    6       Aortic root enlargement, 4.5 cm.   Will arrange for update of aortic measurements at the next visit.  433.30

## 2021-07-15 NOTE — TELEPHONE ENCOUNTER
Patient's wife called back about [employer] forms that were dropped off and she is going try and download and send over again to us to be filled out, her  is not being paid at this time.

## 2021-07-27 ENCOUNTER — TELEPHONE (OUTPATIENT)
Dept: INTERNAL MEDICINE CLINIC | Age: 61
End: 2021-07-27

## 2021-07-27 NOTE — TELEPHONE ENCOUNTER
Patient's wife called to ask Dr Costa Alaniz about her  condition. Mr. Char Gonzales went for a routine walk with his wife this morning and felt a pulling or pushing on his left side. The patient thought his wife was pushing him toward the left but she wasn't. He also complained of lethargy and falling on his left side in the shower after his walk. The patient doesn't have any other symptoms, I.e. nausea, vomiting, dizziness, pain, muscle tightness or achyness. Due to the patient's June 2021 heart attack, the patient was advised to go to the ED for evaluation.

## 2021-08-10 ENCOUNTER — OFFICE VISIT (OUTPATIENT)
Dept: NEUROLOGY | Age: 61
End: 2021-08-10
Payer: COMMERCIAL

## 2021-08-10 VITALS
SYSTOLIC BLOOD PRESSURE: 140 MMHG | DIASTOLIC BLOOD PRESSURE: 94 MMHG | RESPIRATION RATE: 20 BRPM | HEIGHT: 73 IN | HEART RATE: 61 BPM | WEIGHT: 206.4 LBS | OXYGEN SATURATION: 98 % | TEMPERATURE: 98.2 F | BODY MASS INDEX: 27.35 KG/M2

## 2021-08-10 DIAGNOSIS — R53.1 LEFT-SIDED WEAKNESS: ICD-10-CM

## 2021-08-10 DIAGNOSIS — I63.81 STROKE OF RIGHT BASAL GANGLIA (HCC): Primary | ICD-10-CM

## 2021-08-10 PROCEDURE — 99214 OFFICE O/P EST MOD 30 MIN: CPT | Performed by: STUDENT IN AN ORGANIZED HEALTH CARE EDUCATION/TRAINING PROGRAM

## 2021-08-10 NOTE — PROGRESS NOTES
Siddharth Vaughn is a 57 y.o. male . presents for No chief complaint on file. .    A 63years old male patient with past medical history of hyperlipidemia, sleep apnea here for evaluation of stroke. He was admitted to [facility] on June 1, 2021 after presenting with left-sided weakness. 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 Felt a funny feeling sensation over the left hand. When he was trying to walk afterwards, he has some difficulty with the left lower extremity. Was noticed to have mild facial droop. Wife mentioned that his speech was slow a little otherwise no obvious speech difficulty. No significant changes in his vision. MRI of the brain showed lacunar stroke involving the right basal ganglia. Carotid Doppler ultrasound was unremarkable. He had a transthoracic echo which showed a normal ejection fraction with no shunt or thrombus. No detected atrial fibrillation during hospital stay. His left-sided weakness is progressively getting better. Sometimes might lose his balance. Mildly decreased handgrip on the left side. He does not drag his leg when he is walking. Wife mentioned episodes of some behavioral changes over he becomes irritable; patient denied depression. mL after his stroke, wife mentioned that he was having episodes of veering to the left side. Was sleeping a lot. Around 4 July, wife asked him to park something to put into the car to go to [employer]; he was not able to do it. Patient currently driving. But he stays in his a lot. He was discharged with Plavix and aspirin and is still taking it. Also has atorvastatin 40 mg p.o. per day. Following with his primary care provider. Review of Systems   Constitutional: Negative for chills, fever and weight loss. HENT: Positive for hearing loss (mild). Negative for tinnitus.     Eyes: Negative for blurred vision and double vision. Respiratory: Negative for cough and shortness of breath. Cardiovascular: Negative for chest pain and leg swelling. Gastrointestinal: Negative for nausea and vomiting. Genitourinary: Negative for dysuria, frequency and urgency. Musculoskeletal: Negative for back pain and neck pain. Skin: Negative for itching and rash. Neurological: Positive for sensory change (left hand) and focal weakness (left side). Negative for dizziness, tingling, tremors, speech change, seizures and headaches. Endo/Heme/Allergies: Bruises/bleeds easily. Psychiatric/Behavioral: The patient is not nervous/anxious and does not have insomnia. Past Medical History:   Diagnosis Date    Allergic rhinitis     Benign fundic gland polyps of stomach 09/15/2020    Dr Tobias Moran    Carotid stenosis 06/2021    BICA<50%    COVID-19 virus infection 01/2021    urgent care    CVA (cerebral vascular accident) ([facility] [address] 06/01/2021    mri showed acute R basal ganglia infarct; manifesting as left sided weakness/incoordination; Dr Bernadette Ayala Dyslipidemia     calculated 10 year risk score was 6.7%(2/14); 7.7% (3/15); 7.9% (2/15); 6.3% (3/17); 9.3% (4/18); declined statin repeatedly; declined ca score    H/O echocardiogram 06/2021    echo showed ef 55%, mild cLVH, gr 1 dd, mod aor ropot dilation 4.5cm, mild LAE, neg bubble study    KAVYA (obstructive sleep apnea)     Dr. Vazquez Flattery Overweight(278.02)     Zoster 1990s    right T10       Past Surgical History:   Procedure Laterality Date    HX COLONOSCOPY      Dr Yany Murcia 7/16/10 neg; Dr Tobias Moran 9/15/20 neg    [id number]  11/08    echo nl lv, ef 55%    [place] CARDIAC SURG PROCEDURE UNLIST  2007    thallium negative        No family history on file.      Social History     Socioeconomic History    Marital status:      Spouse name: Not on file    Number of children: 2    Years of education: Not on file    Highest education level: Not on file Occupational History    Occupation:    Tobacco Use    Smoking status: Never Smoker    Smokeless tobacco: Never Used   Substance and Sexual Activity    Alcohol use: No    Drug use: No    Sexual activity: Not on file   Other Topics Concern    Not on file   Social History Narrative    Not on file     Social Determinants of Health     Financial Resource Strain:     Difficulty of Paying Living Expenses:    Food Insecurity:     Worried About Running Out of Food in the Last Year:     [address] in the Last Year:    Transportation Needs:     Lack of Transportation (Medical):  Lack of Transportation (Non-Medical):    Physical Activity:     Days of Exercise per Week:     Minutes of Exercise per Session:    Stress:     Feeling of Stress :    Social Connections:     Frequency of Communication with Friends and Family:     Frequency of Social Gatherings with Friends and Family:     Attends [employer] Services:     Active Member of Clubs or Organizations:     Attends Club or Organization Meetings:     Marital Status:    Intimate Partner Violence:     Fear of Current or Ex-Partner:     Emotionally Abused:     Physically Abused:     Sexually Abused: Allergies   Allergen Reactions    Pcn [Penicillins] Shortness of Breath         Current Outpatient Medications   Medication Sig Dispense Refill    atorvastatin (LIPITOR) 80 mg tablet Take 1 Tablet by mouth daily. 90 Tablet 1    clopidogreL (PLAVIX) 75 mg tab Take 1 Tablet by mouth daily for 180 days. 90 Tablet 1    aspirin 81 mg chewable tablet Take 1 Tablet by mouth daily. 30 Tablet 0    atorvastatin (LIPITOR) 80 mg tablet Take 1 Tablet by mouth nightly. 30 Tablet 0    clopidogreL (PLAVIX) 75 mg tab Take 1 Tablet by mouth daily. 21 Tablet 0         Physical Exam  Constitutional:       Appearance: Normal appearance. HENT:      Head: Normocephalic and atraumatic.       Mouth/Throat:      Mouth: Mucous membranes are moist. Pharynx: Oropharynx is clear. No oropharyngeal exudate. Eyes:      Extraocular Movements: Extraocular movements intact. Pupils: Pupils are equal, round, and reactive to light. Pulmonary:      Effort: Pulmonary effort is normal. No respiratory distress. Musculoskeletal:         General: Normal range of motion. Cervical back: Normal range of motion and neck supple. Right lower leg: No edema. Left lower leg: No edema. Neurological:      Mental Status: He is alert. Comments: Mental status: Awake, alert, oriented x3, follows simple and complex commands, no neglect, no extinction to DSS or VSS. Speech and languge: fluent, coherent, and comprehension intact  CN: VFF, EOMI, PERRLA, face sensation intact , no facial asymmetry noted, palate elevation symmetric bilat, SS+SCM 5/5 bilat, tongue midline  Motor: no pronator drift, tone normal throughout, strength 5/5 throughout  Sensory: intact to light touch and PP throughout  Coordination: FNF, HS accurate w/o dysmetria  DTR: 2+ throughout  Gait: Normal; able to do tandem walking.              Admission on 06/01/2021, Discharged on 06/02/2021   Component Date Value Ref Range Status    Ventricular Rate 06/01/2021 49  BPM Final    Atrial Rate 06/01/2021 49  BPM Final    P-R Interval 06/01/2021 176  ms Final    QRS Duration 06/01/2021 98  ms Final    Q-T Interval 06/01/2021 438  ms Final    QTC Calculation (Bezet) 06/01/2021 395  ms Final    Calculated P Axis 06/01/2021 48  degrees Final    Calculated R Axis 06/01/2021 -10  degrees Final    Calculated T Axis 06/01/2021 27  degrees Final    Diagnosis 06/01/2021    Final                    Value:Sinus bradycardia with sinus arrhythmia  Incomplete right bundle branch block  Borderline ECG  No previous ECGs available  Confirmed by Lakshmi Hube[employer] ([id number]) on 6/1/2021 4:27:57 PM      WBC 06/01/2021 5.3  4.6 - 13.2 K/uL Final    RBC 06/01/2021 4.33* 4.35 - 5.65 M/uL Final    HGB 06/01/2021 13.6  13.0 - 16.0 g/dL Final    HCT 06/01/2021 40.5  36.0 - 48.0 % Final    MCV 06/01/2021 93.5  74.0 - 97.0 FL Final    MCH 06/01/2021 31.4  24.0 - 34.0 PG Final    MCHC 06/01/2021 33.6  31.0 - 37.0 g/dL Final    RDW 06/01/2021 12.1  11.6 - 14.5 % Final    PLATELET 07/96/8458 793  135 - 420 K/uL Final    MPV 06/01/2021 10.1  9.2 - 11.8 FL Final    NEUTROPHILS 06/01/2021 63  40 - 73 % Final    LYMPHOCYTES 06/01/2021 24  21 - 52 % Final    MONOCYTES 06/01/2021 9  3 - 10 % Final    EOSINOPHILS 06/01/2021 3  0 - 5 % Final    BASOPHILS 06/01/2021 1  0 - 2 % Final    ABS. NEUTROPHILS 06/01/2021 3.3  1.8 - 8.0 K/UL Final    ABS. LYMPHOCYTES 06/01/2021 1.3  0.9 - 3.6 K/UL Final    ABS. MONOCYTES 06/01/2021 0.5  0.05 - 1.2 K/UL Final    ABS. EOSINOPHILS 06/01/2021 0.1  0.0 - 0.4 K/UL Final    ABS. BASOPHILS 06/01/2021 0.1  0.0 - 0.1 K/UL Final    DF 06/01/2021 AUTOMATED    Final    Sodium 06/01/2021 142  136 - 145 mmol/L Final    Potassium 06/01/2021 3.9  3.5 - 5.5 mmol/L Final    Chloride 06/01/2021 112* 100 - 111 mmol/L Final    CO2 06/01/2021 27  21 - 32 mmol/L Final    Anion gap 06/01/2021 3  3.0 - 18 mmol/L Final    Glucose 06/01/2021 99  74 - 99 mg/dL Final    BUN 06/01/2021 21* 7.0 - 18 MG/DL Final    Creatinine 06/01/2021 0.86  0.6 - 1.3 MG/DL Final    BUN/Creatinine ratio 06/01/2021 24* 12 - 20   Final    GFR est AA 06/01/2021 >60  >60 ml/min/1.73m2 Final    GFR est non-AA 06/01/2021 >60  >60 ml/min/1.73m2 Final    Comment: (NOTE)  Estimated GFR is calculated using the Modification of Diet in Renal   Disease (MDRD) Study equation, reported for both  Americans   (GFRAA) and non- Americans (GFRNA), and normalized to 1.73m2   body surface area. The physician must decide which value applies to   the patient. The MDRD study equation should only be used in   individuals age 25 or older.  It has not been validated for the   following: pregnant women, patients with serious comorbid conditions,   or on certain medications, or persons with extremes of body size,   muscle mass, or nutritional status.  Calcium 06/01/2021 9.3  8.5 - 10.1 MG/DL Final    Troponin-I, QT 06/01/2021 <0.02  0.0 - 0.045 NG/ML Final    Comment: The presence of detectable troponin above the reference range indicates myocardial injury which may be due to ischemia, myocarditis, trauma, etc.  Clinical correlation is necessary to establish the significance of this finding. Sequential testing is recommended to determine if the typical rise and fall of cTnI is demonstrated. Note:  Cardiac troponin I has a relatively long half life and may be present well after the CK MB has returned to baseline. The reference range is based on the 99th percentile of the referent population.       Prothrombin time 06/01/2021 13.3  11.5 - 15.2 sec Final    INR 06/01/2021 1.0  0.8 - 1.2   Final    Comment:            INR Therapeutic Ranges         (on stable oral anticoagulant):     INDICATION                INR  DVT/PE/Atrial Fib          2.0-3.0  MI/Mechanical Heart Valve  2.5-3.5      IVSd 06/02/2021 1.22* 0.60 - 1.00 cm Final    LVIDd 06/02/2021 4.73  4.20 - 5.90 cm Final    LVIDs 06/02/2021 3.04  cm Final    LVOT d 06/02/2021 2.40  cm Final    LVPWd 06/02/2021 1.13* 0.60 - 1.00 cm Final    LVOT Cardiac Output 06/02/2021 5.39  liter/minute Final    LVOT Peak Gradient 06/02/2021 5.18  mmHg Final    Left Ventricular Outflow Tract Lucy* 06/02/2021 2.33  mmHg Final    LVOT SV 06/02/2021 108.9  mL Final    LVOT Peak Velocity 06/02/2021 113.79  cm/s Final    LVOT VTI 06/02/2021 24.16  cm Final    LA Volume 06/02/2021 74.54  18.0 - 58.0 mL Final    LA Area 4C 06/02/2021 22.39  cm2 Final    LA Vol 2C 06/02/2021 75.39* 18.00 - 58.00 mL Final    LA Vol 4C 06/02/2021 63.27* 18.00 - 58.00 mL Final    MV A Rajat 06/02/2021 52.76  cm/s Final    Mitral Valve E Wave Deceleration T* 06/02/2021 199.97  ms Final    MV E Rajat 06/02/2021 56.65  cm/s Final    E/E' lateral 06/02/2021 5.69   Final    E/E' septal 06/02/2021 6.88   Final    LV E' Lateral Velocity 06/02/2021 9.95  cm/s Final    LV E' Septal Velocity 06/02/2021 8.23  cm/s Final    Tapse 06/02/2021 3.34* 1.50 - 2.00 cm Final    Ao Root D 06/02/2021 4.31  cm Final    MV E/A 06/02/2021 1.07   Final    LV Mass AL 06/02/2021 207.8  88.0 - 224.0 g Final    LV Mass AL Index 06/02/2021 96.2  49.0 - 115.0 g/m2 Final    E/E' ratio (averaged) 06/02/2021 6.29   Final    LA Vol Index 06/02/2021 34.51  16.00 - 28.00 ml/m2 Final    LA Vol Index 06/02/2021 34.90  16.00 - 28.00 ml/m2 Final    LA Vol Index 06/02/2021 29.29  16.00 - 28.00 ml/m2 Final    TSH 06/01/2021 3.49  0.36 - 3.74 uIU/mL Final    Right cca dist sys 06/01/2021 69.9  cm/s Final    Right CCA dist webb 06/01/2021 14.9  cm/s Final    RIGHT COMMON CAROTID ARTERY MID S 06/01/2021 80.59  cm/s Final    RIGHT COMMON CAROTID ARTERY MID D 06/01/2021 14.37  cm/s Final    Right CCA prox sys 06/01/2021 119.4  cm/s Final    Right CCA prox webb 06/01/2021 12.8  cm/s Final    Right Bulb PSV 06/01/2021 90.4  cm/s Final    Right Bulb EDV 06/01/2021 9.1  cm/s Final    Right ICA dist sys 06/01/2021 106.5  cm/s Final    Right ICA dist webb 06/01/2021 27.6  cm/s Final    Right ICA mid sys 06/01/2021 95.6  cm/s Final    Right ICA mid webb 06/01/2021 31.5  cm/s Final    Right ICA prox sys 06/01/2021 77.1  cm/s Final    Right ICA prox webb 06/01/2021 21.5  cm/s Final    Right vertebral sys 06/01/2021 40.9  cm/s Final    RIGHT VERTEBRAL ARTERY D 06/01/2021 8.00  cm/s Final    Right ICA/CCA sys 06/01/2021 1.5   Final    Left CCA dist sys 06/01/2021 60.7  cm/s Final    Left CCA dist webb 06/01/2021 15.4  cm/s Final    LEFT COMMON CAROTID ARTERY MID S 06/01/2021 114.66  cm/s Final    LEFT COMMON CAROTID ARTERY MID D 06/01/2021 17.60  cm/s Final    Left CCA prox sys 06/01/2021 100.4  cm/s Final    Left CCA prox webb 06/01/2021 18.9 cm/s Final    Left Bulb PSV 06/01/2021 72.9  cm/s Final    Left Bulb EDV 06/01/2021 15.4  cm/s Final    Left ICA dist sys 06/01/2021 105.9  cm/s Final    Left ICA dist webb 06/01/2021 31.8  cm/s Final    Left ICA mid sys 06/01/2021 73.2  cm/s Final    Left ICA mid webb 06/01/2021 24.1  cm/s Final    Left ICA prox sys 06/01/2021 69.5  cm/s Final    Left ICA prox webb 06/01/2021 20.0  cm/s Final    Left vertebral sys 06/01/2021 50.0  cm/s Final    LEFT VERTEBRAL ARTERY D 06/01/2021 21.20  cm/s Final    Left ICA/CCA sys 06/01/2021 1.74   Final    Right eca sys 06/01/2021 85.0  cm/s Final    Right subclavian sys 06/01/2021 79.4  cm/s Final    Left ECA sys 06/01/2021 85.3  cm/s Final    Left subclavian sys 06/01/2021 127.5  cm/s Final    RIGHT EXTERNAL CAROTID ARTERY D 06/01/2021 4.7  cm/s Final    LEFT SUBCLAVIAN ARTERY D 06/01/2021 0.0  cm/s Final    LEFT EXTERNAL CAROTID ARTERY D 06/01/2021 5.0  cm/s Final    RIGHT SUBCLAVIAN ARTERY D 06/01/2021 0.0  cm/s Final    Glucose (POC) 06/01/2021 98  70 - 110 mg/dL Final    Comment: (NOTE)  The FDA has indicated that no capillary point of care blood glucose   monitoring systems are approved for use in \"critically ill\" patients,   however they have not defined this population. The College of   American Pathologists has recommended that these devices should not   be used in cases such as severe hypotension, dehydration, shock, and   hyperglycemic-hyperosmolar state, amongst others. Venous or arterial   collection is the recommended specimen for testing these patients.  LIPID PROFILE 06/02/2021        Final    Cholesterol, total 06/02/2021 205* <200 MG/DL Final    Triglyceride 06/02/2021 113  <150 MG/DL Final    Comment: The drugs N-acetylcysteine (NAC) and  Metamiszole have been found to cause falsely  low results in this chemical assay.  Please  be sure to submit blood samples obtained  BEFORE administration of either of these  drugs to assure correct results.  HDL Cholesterol 06/02/2021 38* 40 - 60 MG/DL Final    LDL, calculated 06/02/2021 144.4* 0 - 100 MG/DL Final    VLDL, calculated 06/02/2021 22.6  MG/DL Final    CHOL/HDL Ratio 06/02/2021 5.4* 0 - 5.0   Final    Hemoglobin A1c 06/02/2021 5.7* 4.2 - 5.6 % Final    Comment: (NOTE)  HbA1C Interpretive Ranges  <5.7              Normal  5.7 - 6.4         Consider Prediabetes  >6.5              Consider Diabetes      Est. average glucose 06/02/2021 117  mg/dL Final    Comment: (NOTE)  The eAG should be interpreted with patient characteristics in mind   since ethnicity, interindividual differences, red cell lifespan,   variation in rates of glycation, etc. may affect the validity of the   calculation.       WBC 06/02/2021 5.8  4.6 - 13.2 K/uL Final    RBC 06/02/2021 4.03* 4.35 - 5.65 M/uL Final    HGB 06/02/2021 12.7* 13.0 - 16.0 g/dL Final    HCT 06/02/2021 38.6  36.0 - 48.0 % Final    MCV 06/02/2021 95.8  74.0 - 97.0 FL Final    MCH 06/02/2021 31.5  24.0 - 34.0 PG Final    MCHC 06/02/2021 32.9  31.0 - 37.0 g/dL Final    RDW 06/02/2021 12.3  11.6 - 14.5 % Final    PLATELET 90/88/4489 653  135 - 420 K/uL Final    MPV 06/02/2021 10.6  9.2 - 11.8 FL Final    Sodium 06/02/2021 142  136 - 145 mmol/L Final    Potassium 06/02/2021 4.0  3.5 - 5.5 mmol/L Final    Chloride 06/02/2021 113* 100 - 111 mmol/L Final    CO2 06/02/2021 24  21 - 32 mmol/L Final    Anion gap 06/02/2021 5  3.0 - 18 mmol/L Final    Glucose 06/02/2021 88  74 - 99 mg/dL Final    BUN 06/02/2021 14  7.0 - 18 MG/DL Final    Creatinine 06/02/2021 0.78  0.6 - 1.3 MG/DL Final    BUN/Creatinine ratio 06/02/2021 18  12 - 20   Final    GFR est AA 06/02/2021 >60  >60 ml/min/1.73m2 Final    GFR est non-AA 06/02/2021 >60  >60 ml/min/1.73m2 Final    Calcium 06/02/2021 8.5  8.5 - 10.1 MG/DL Final    Bilirubin, total 06/02/2021 0.5  0.2 - 1.0 MG/DL Final    ALT (SGPT) 06/02/2021 41  16 - 61 U/L Final    AST (SGOT) 06/02/2021 36  10 - 38 U/L Final    Alk. phosphatase 06/02/2021 62  45 - 117 U/L Final    Protein, total 06/02/2021 6.8  6.4 - 8.2 g/dL Final    Albumin 06/02/2021 3.5  3.4 - 5.0 g/dL Final    Globulin 06/02/2021 3.3  2.0 - 4.0 g/dL Final    A-G Ratio 06/02/2021 1.1  0.8 - 1.7   Final    Glucose (POC) 06/02/2021 92  70 - 110 mg/dL Final    Comment: (NOTE)  The FDA has indicated that no capillary point of care blood glucose   monitoring systems are approved for use in \"critically ill\" patients,   however they have not defined this population. The College of   American Pathologists has recommended that these devices should not   be used in cases such as severe hypotension, dehydration, shock, and   hyperglycemic-hyperosmolar state, amongst others. Venous or arterial   collection is the recommended specimen for testing these patients.  Glucose (POC) 06/02/2021 86  70 - 110 mg/dL Final    Comment: (NOTE)  The FDA has indicated that no capillary point of care blood glucose   monitoring systems are approved for use in \"critically ill\" patients,   however they have not defined this population. The College of   American Pathologists has recommended that these devices should not   be used in cases such as severe hypotension, dehydration, shock, and   hyperglycemic-hyperosmolar state, amongst others. Venous or arterial   collection is the recommended specimen for testing these patients. ICD-10-CM ICD-9-CM    1. Stroke of right basal ganglia (HCC)  I63.81 434.91     Lacunar   2. Left-sided weakness  R53.1 728.87      A 57years old male patient here for follow-up of right basal lacunar stroke from June 1, 2021. Presented with left-sided weakness. Symptoms are progressively getting better. On exam today, no obvious weakness. MRI of the brain at the time of admission to [facility] as shown DWI lesions over the right basal ganglia. Had a carotid Doppler ultrasound which was unremarkable.   Transthoracic echo was also unremarkable. His lipid panel has shown elevated LDL. Hemoglobin A1c was 5.7. On Plavix and aspirin; will continue with both for a total of 90 days. Will stop Plavix after that and he will continue with aspirin. He is also on atorvastatin 40 mg p.o. per day. I have advised him to remain active, exercise, and follow closely with his PCP for risk factor modification. He is not driving currently; advised him to avoid driving for a total of 6 months. Will go to the emergency room if any new symptoms. We will see him again in 3 months time.

## 2021-09-17 ENCOUNTER — HOSPITAL ENCOUNTER (OUTPATIENT)
Dept: LAB | Age: 61
Discharge: HOME | End: 2021-09-17

## 2021-09-17 LAB
A-G RATIO,AGRAT: 1.6 RATIO (ref 1.1–2.6)
ALBUMIN SERPL-MCNC: 4.2 G/DL (ref 3.5–5)
ALP SERPL-CCNC: 82 U/L (ref 40–125)
ALT SERPL-CCNC: 47 U/L (ref 5–40)
ANION GAP SERPL CALC-SCNC: 8 MMOL/L (ref 3–15)
AST SERPL W P-5'-P-CCNC: 48 U/L (ref 10–37)
AVG GLU, 10930: 123 MG/DL (ref 91–123)
BILIRUB SERPL-MCNC: 0.5 MG/DL (ref 0.2–1.2)
BUN SERPL-MCNC: 18 MG/DL (ref 6–22)
CALCIUM SERPL-MCNC: 9.4 MG/DL (ref 8.4–10.5)
CHLORIDE SERPL-SCNC: 108 MMOL/L (ref 98–110)
CHOLEST SERPL-MCNC: 114 MG/DL (ref 110–200)
CO2 SERPL-SCNC: 26 MMOL/L (ref 20–32)
CREAT SERPL-MCNC: 0.8 MG/DL (ref 0.8–1.6)
ERYTHROCYTE [DISTWIDTH] IN BLOOD BY AUTOMATED COUNT: 13.4 % (ref 10–15.5)
FE % SATURATION,PSAT: 36 % (ref 20–50)
FOLATE,FOL: 18.7 NG/ML
GFRAA, 66117: >60
GFRNA, 66118: >60
GLOBULIN,GLOB: 2.7 G/DL (ref 2–4)
GLUCOSE SERPL-MCNC: 94 MG/DL (ref 70–99)
HBA1C MFR BLD HPLC: 5.9 % (ref 4.8–5.6)
HCT VFR BLD AUTO: 38.5 % (ref 39.3–51.6)
HDLC SERPL-MCNC: 3.4 MG/DL (ref 0–5)
HDLC SERPL-MCNC: 34 MG/DL
HGB BLD-MCNC: 12.1 G/DL (ref 13.1–17.2)
IRON,IRN: 101 MCG/DL (ref 45–160)
LDL/HDL RATIO,LDHD: 2
LDLC SERPL CALC-MCNC: 68 MG/DL (ref 50–99)
MCH RBC QN AUTO: 32 PG (ref 26–34)
MCHC RBC AUTO-ENTMCNC: 31 G/DL (ref 31–36)
MCV RBC AUTO: 100 FL (ref 80–95)
NON-HDL CHOLESTEROL, 011976: 80 MG/DL
PLATELET # BLD AUTO: 269 K/UL (ref 140–440)
PMV BLD AUTO: 11.2 FL (ref 9–13)
POTASSIUM SERPL-SCNC: 4.2 MMOL/L (ref 3.5–5.5)
PROT SERPL-MCNC: 6.9 G/DL (ref 6.2–8.1)
PSA SERPL-MCNC: 0.21 NG/ML
RBC # BLD AUTO: 3.84 M/UL (ref 3.8–5.8)
SENTARA SPECIMEN COL,SENBCF: NORMAL
SODIUM SERPL-SCNC: 142 MMOL/L (ref 133–145)
TIBC,TIBC: 277 MCG/DL (ref 228–428)
TRIGL SERPL-MCNC: 62 MG/DL (ref 40–149)
UIBC SERPL-MCNC: 176 MCG/DL (ref 110–370)
VIT B12 SERPL-MCNC: 831 PG/ML (ref 211–911)
VLDLC SERPL CALC-MCNC: 12 MG/DL (ref 8–30)
WBC # BLD AUTO: 5.7 K/UL (ref 4–11)

## 2021-09-17 PROCEDURE — 99001 SPECIMEN HANDLING PT-LAB: CPT

## 2021-09-22 NOTE — PROGRESS NOTES
57 y.o. WHITE/NON- male who presents for RPE. Wife was present for the evaluation    He was dx'ed w a stroke in June with left sided weakness. He saw Dr Vijaya Demarco and the rec was to take double therapy x3mo then back to asa alone. Also saw Dr Pillo Holliday for opinion regarding possible need for prolonged afib monitoring which was not recommended. In the interim, the wife thinks he's had 2 episodes which she's concerned could've been more tias. The first one occured in July when the pt got super angry and irritable and 'couldn't think' and was lethargic for a few hours. The 2nd was in August when they were walking and she noted him to all of a sudden start consistently veering towards his left and 'did not act right' and he went to sleep as soon as they got home. Both episodes lasted less than an hour or 2 at most.  She suggested they go to the ER both time for evaluation but the pt vehemently declined. He denies any actual weakness with the episodes above, no numbness, tingling, incoordination, diplopia, dysphagia. He felt fine after both episodes. He is not back to work and feels that 'his coping mechanisms' have been compromised with the stroke as he seems easily annoyed. Pt denies any anxiety or depression sx otherwise. No cp, sob, pnd, edema, palpitations. They are now walking 3 mi daily weather permitting    Denied Gi or  complaints.      The sleep apnea is controlled and energy level is good    The allergies are controlled at this time    Past Medical History:   Diagnosis Date    Allergic rhinitis     Benign fundic gland polyps of stomach 09/15/2020    Dr Ge Cortes    Carotid stenosis 06/2021    BICA<50%    COVID-19 virus infection 01/2021    urgent care    CVA (cerebral vascular accident) ([facility] [address] 06/01/2021    mri showed acute R basal ganglia infarct; manifesting as left sided weakness/incoordination; Dr Daryle Piper    Dyslipidemia     calculated 10 year risk score was 6.7%(2/14); 7.7% (3/15); 7.9% (2/15); 6.3% (3/17); 9.3% (4/18); declined statin repeatedly; declined ca score    H/O echocardiogram 06/2021    echo showed ef 55%, mild cLVH, gr 1 dd, mod aor ropot dilation 4.5cm, mild LAE, neg bubble study    KAVYA (obstructive sleep apnea)     Dr. Amarilys Valenzuela Overweight(278.02)     Zoster 1990s    right T10     Past Surgical History:   Procedure Laterality Date   [facility] [place]      Dr Loly Hicks 7/16/10 neg; Dr Franko Yoo 9/15/20 neg    [facility]  11/08    echo nl lv, ef 55%    [place] CARDIAC SURG PROCEDURE UNLIST  2007    thallium negative     Social History     Socioeconomic History    Marital status:      Spouse name: Not on file    Number of children: 2    Years of education: Not on file    Highest education level: Not on file   Occupational History    Occupation:    Tobacco Use    Smoking status: Never Smoker    Smokeless tobacco: Never Used   Substance and Sexual Activity    Alcohol use: No    Drug use: No    Sexual activity: Not on file   Other Topics Concern    Not on file   Social History Narrative    Not on file     Social Determinants of Health     Financial Resource Strain:     Difficulty of Paying Living Expenses:    Food Insecurity:     Worried About Running Out of Food in the Last Year:     [address] in the Last Year:    Transportation Needs:     Lack of Transportation (Medical):      Lack of Transportation (Non-Medical):    Physical Activity:     Days of Exercise per Week:     Minutes of Exercise per Session:    Stress:     Feeling of Stress :    Social Connections:     Frequency of Communication with Friends and Family:     Frequency of Social Gatherings with Friends and Family:     Attends Congregation Services:     Active Member of Clubs or Organizations:     Attends Club or Organization Meetings:     Marital Status:    Intimate Partner Violence:     Fear of Current or Ex-Partner:     Emotionally Abused:     Physically Abused:     Sexually Abused:       Current Outpatient Medications   Medication Sig    clopidogreL (PLAVIX) 75 mg tab Take 1 Tablet by mouth daily for 180 days.  aspirin 81 mg chewable tablet Take 1 Tablet by mouth daily.  atorvastatin (LIPITOR) 80 mg tablet Take 1 Tablet by mouth nightly. No current facility-administered medications for this visit. Allergies   Allergen Reactions    Pcn [Penicillins] Shortness of Breath     REVIEW OF SYSTEMS: colo 9/20 Dr Tomasz Tejeda  Ophtho  no vision change or eye pain  Oral  no mouth pain, tongue or tooth problems  Ears  no hearing loss, ear pain, fullness, no swallowing problems  Cardiac  no CP, PND, orthopnea, edema, palpitations or syncope  Chest  no breast masses  Resp  no wheezing, chronic coughing, dyspnea  GI  no heartburn, nausea, vomiting, change in bowel habits, bleeding, hemorrhoids  Urinary  no dysuria, hematuria, flank pain, urgency, frequency    Visit Vitals  /81   Pulse 70   Temp 97.7 °F (36.5 °C) (Temporal)   Resp 16   Ht 6' 1\" (1.854 m)   Wt 203 lb (92.1 kg)   SpO2 96%   BMI 26.78 kg/m²   A&O x3  Affect is appropriate. Mood stable  No apparent distress  Anicteric, no JVD, adenopathy or thyromegaly. No carotid bruits or radiated murmur  Lungs clear to auscultation, no wheezes or rales  Heart showed regular rate and rhythm. No murmur, rubs, gallops  Abdomen soft nontender, no hepatosplenomegaly or masses. Extremities without edema.   Pulses 1-2+ symmetrically    LABS  From 5/10 showed   gluc 97, cr 0.90, gfr>60,          wbc 5.5, hb 12.9, plt 219  From 12/10 showed gluc 89, cr 0.90,    alt 52,         chol 207, tg 104, hdl 41, ldl-c 145, wbc 5.8, hb 13.3, plt 295, psa 0.20  From 12/11 showed gluc 83, cr 0.82, gfr 102, alt 26, ldl-p 2121, chol 196, tg 163, hdl 36, ldl-c 139,  wbc 4.6, hb 13.0, plt 267, psa 0.30, tsh 3.51  From 7/12 showed       ldl-p 1614, chol 180, tg 92,   hdl 35, ldl-c 127  From 12/12 showed       ldl-p 1671, chol 159, tg 67,   hdl 41, ldl-c 105  From 2/14 showed   gluc 90, cr 0.90, gfr>60, alt 38,         chol 188, tg 86,   hdl 37, ldl-c 134, wbc 4.9, hb 13.0, plt 337, psa 0.27, ua neg  From 2/15 showed   gluc 92, cr 0.90, gfr>60, alt 28,         chol 197, tg 127, hdl 38, ldl-c 133, wbc 5.3, hb 12.9, plt 293, psa 0.20, ua neg  From 2/16 showed   gluc 85, cr 0.70, gfr>60, alt 43,         chol 198, tg 121, hdl 39, ldl-c 135, wbc 5.5, hb 13.3, plt 305, psa 0.30, ua neg,   ast 44, ap 71, hep c-  From 3/17 showed              chol 175, tg 85,   hdl 41, ldl-c 117  From 4/18 showed   gluc 93, cr 0.80, gfr>60, alt 34,         chol 202, tg 113, hdl 37, ldl-c 143, wbc 5.3, hb 12.7, plt 290,              tsh 3.00  From 4/19 showed                         wbc 4.4, hb 12.4, plt 268, psa 0.19  From 7/20 showed   gluc 93, cr 0.80, gfr>60, alt 30,         wbc 5.3, hb 12.9, plt 250, psa 0.20  Form 6/21 showed   gluc 99, cr 0.86, gfr>60,  hba1c 5.7, chol 205, tg 113, hdl 38,  ldl-c 144, wbc 5.4, hb 13.6, plt 249,      tsh 3.49  From 9/21 showed   gluc 94, cr 0.80, gfr>60, alt 47, hba1c 5.9, chol 114, tg 62,   hdl 34, ldl-c 68,    wbc 5.7, hb 12.1, plt 269, psa 0.29,      ast 48, ap 82, tb 0.5, fe 101, %sat 36, b12 81, fol 18.7, spep neg    We reviewed the patient's labs from the last several visits to point out trends in the numbers          Patient Active Problem List   Diagnosis Code    Sleep apnea Dr. Lalit Rice G47.30    Dyslipidemia E78.5    Overweight with body mass index (BMI) of 25 to 25.9 in adult E66.3, Z68.25    H/O: stroke Z86.73    LVH (left ventricular hypertrophy) I51.7    Bilateral carotid artery stenosis I65.23     ASSESSMENT AND PLAN:  1. Dyslipidemia. Continue lipitor  2. Kvng on cpap. F/u Dr. Lalit Rice   3. S/p R BG cva. Asa and plavix x2 mos then asa 325 alone; f/u neurology as scheduled    NEW ISSUES  4. Anemia. Negative evaluation, will continue to follow  5. Cardiology.   Will discuss possibly getting monitoring study with cardiology re: episodes above  6. Transaminasemia. Recheck next draw and proceed with evaluation if persistent  7. Forms filled out  8. Neuro. Unclear if the episodes above are neurologic in nature. Long discussion with pt and wife about going to ER asap if he has any recurrences so he can be quickly evaluated. Continue plavix        RTC 3/22    Above conditions discussed at length and patient vocalized understanding. All questions answered to patient satisfaction          ICD-10-CM ICD-9-CM    1. Physical exam  Z00.00 V70.9 AMB POC FECAL BLOOD, OCCULT, QL 1 CARD   2. LVH (left ventricular hypertrophy)  I51.7 429.3    3. Bilateral carotid artery stenosis  I65.23 433.10      433.30    4. H/O: stroke  Z86.73 V12.54    5. Obstructive sleep apnea syndrome  G47.33 327.23    6.  Dyslipidemia  E78.5 272.4 CBC W/O DIFF      METABOLIC PANEL, COMPREHENSIVE      LIPID PANEL

## 2021-09-23 LAB
ALBUMIN, 001488: 55.1 % (ref 46.6–62.6)
ALPHA-1-GLOBULIN, 001489: 2.4 % (ref 1.7–4.1)
ALPHA-2-GLOBULIN, 001490: 13.4 % (ref 5.9–13.5)
BETA GLOBULIN, 001491: 14 % (ref 10.9–18.9)
GAMMA GLOBULIN, 001492: 15.2 % (ref 11.6–24.4)
PE INTERPRETATION, 107352: NORMAL
PROT SERPL-MCNC: 6.9 G/DL (ref 6.2–8.1)

## 2021-09-28 ENCOUNTER — OFFICE VISIT (OUTPATIENT)
Dept: CARDIOLOGY CLINIC | Age: 61
End: 2021-09-28
Payer: COMMERCIAL

## 2021-09-28 VITALS
DIASTOLIC BLOOD PRESSURE: 70 MMHG | HEIGHT: 73 IN | BODY MASS INDEX: 27.17 KG/M2 | SYSTOLIC BLOOD PRESSURE: 126 MMHG | OXYGEN SATURATION: 98 % | HEART RATE: 59 BPM | WEIGHT: 205 LBS

## 2021-09-28 DIAGNOSIS — I63.9 ACUTE STROKE DUE TO ISCHEMIA (HCC): Primary | ICD-10-CM

## 2021-09-28 PROCEDURE — 99214 OFFICE O/P EST MOD 30 MIN: CPT | Performed by: INTERNAL MEDICINE

## 2021-09-28 NOTE — PROGRESS NOTES
Haleigh Scott presents today for   Chief Complaint   Patient presents with    Follow-up     3 months        Velmynor Scott preferred language for health care discussion is english/other. Is someone accompanying this pt? Wife     Is the patient using any DME equipment during [address]? no    Depression Screening:  3 most recent PHQ Screens 9/28/2021   Little interest or pleasure in doing things Not at all   Feeling down, depressed, irritable, or hopeless Not at all   Total Score PHQ 2 0       Learning Assessment:  Learning Assessment 6/29/2021   PRIMARY LEARNER Patient   HIGHEST LEVEL OF EDUCATION - PRIMARY LEARNER  -   BARRIERS PRIMARY LEARNER -   CO-LEARNER CAREGIVER -   PRIMARY LANGUAGE ENGLISH   LEARNER PREFERENCE PRIMARY DEMONSTRATION   ANSWERED BY patient   RELATIONSHIP SELF       Abuse Screening:  Abuse Screening Questionnaire 6/29/2021   Do you ever feel afraid of your partner? N   Are you in a relationship with someone who physically or mentally threatens you? N   Is it safe for you to go home? Y     Pt currently taking Anticoagulant therapy? ASA 81mg every day and Plavix     Coordination of Care:  1. Have you been to the ER, urgent care clinic since your last visit? Hospitalized since your last visit? no    2. Have you seen or consulted any other health care providers outside of the [address] [place] [address] since your last visit? Include any pap smears or colon screening.  no

## 2021-09-29 ENCOUNTER — OFFICE VISIT (OUTPATIENT)
Dept: INTERNAL MEDICINE CLINIC | Age: 61
End: 2021-09-29
Payer: COMMERCIAL

## 2021-09-29 VITALS
HEART RATE: 70 BPM | HEIGHT: 73 IN | DIASTOLIC BLOOD PRESSURE: 81 MMHG | TEMPERATURE: 97.7 F | WEIGHT: 203 LBS | RESPIRATION RATE: 16 BRPM | OXYGEN SATURATION: 96 % | BODY MASS INDEX: 26.9 KG/M2 | SYSTOLIC BLOOD PRESSURE: 125 MMHG

## 2021-09-29 DIAGNOSIS — G47.33 OBSTRUCTIVE SLEEP APNEA SYNDROME: ICD-10-CM

## 2021-09-29 DIAGNOSIS — Z86.73 H/O: STROKE: ICD-10-CM

## 2021-09-29 DIAGNOSIS — I51.7 LVH (LEFT VENTRICULAR HYPERTROPHY): ICD-10-CM

## 2021-09-29 DIAGNOSIS — E78.5 DYSLIPIDEMIA: ICD-10-CM

## 2021-09-29 DIAGNOSIS — Z00.00 PHYSICAL EXAM: Primary | ICD-10-CM

## 2021-09-29 DIAGNOSIS — I65.23 BILATERAL CAROTID ARTERY STENOSIS: ICD-10-CM

## 2021-09-29 PROCEDURE — 82270 OCCULT BLOOD FECES: CPT | Performed by: INTERNAL MEDICINE

## 2021-09-29 PROCEDURE — 99396 PREV VISIT EST AGE 40-64: CPT | Performed by: INTERNAL MEDICINE

## 2021-09-29 NOTE — PROGRESS NOTES
Violette Gresham presents today for   Chief Complaint   Patient presents with   [employer] Physical    Cholesterol Problem    Labs     9-17-21       Coordination of Care:  1. Have you been to the ER, urgent care clinic since your last visit? Hospitalized since your last visit? YES    2. Have you seen or consulted any other health care providers outside of the [address] [place] [address] since your last visit? Include any pap smears or colon screening.  YES

## 2021-10-06 NOTE — PROGRESS NOTES
Subjective:      Jada Bender is in the office today for cardiac re-evaluation. He was  hospitalized at [facility] for an acute stroke.  (6/1/2021). His evaluation was remarkable for acute right basal ganglia infarct. An echocardiogram was done with the findings of normal systolic function and mild LVH. EF was 55 to 60%. There was grade 1 left ventricular diastolic dysfunction. Additionally, there was an enlarged aortic root at 4.5 cm. The patient's symptoms at the time of his presentation were left arm numbness and a speech disturbance with the feeling that his left arm would not take commands from his brain. He has had no cardiac history. He has had no chest pain. He has had no shortness of breath. His stroke symptoms have gradually improved. He was instructed to bring in a blood pressure log which he did. Blood pressures are acceptable. He has no specific complaints in the office today. Patient's cardiac risk factors are dyslipidemia, hypertension. Patient Active Problem List    Diagnosis Date Noted    LVH (left ventricular hypertrophy) 07/09/2021    Bilateral carotid artery stenosis 07/09/2021    Acute stroke due to ischemia ([address] 06/01/2021    Overweight with body mass index (BMI) of 25 to 25.9 in adult 03/13/2017    Dyslipidemia 02/04/2014    Sleep apnea Dr. Gustavo Dillon      Current Outpatient Medications   Medication Sig Dispense Refill    clopidogreL (PLAVIX) 75 mg tab Take 1 Tablet by mouth daily for 180 days. 90 Tablet 1    aspirin 81 mg chewable tablet Take 1 Tablet by mouth daily. 30 Tablet 0    atorvastatin (LIPITOR) 80 mg tablet Take 1 Tablet by mouth nightly.  30 Tablet 0     Allergies   Allergen Reactions    Pcn [Penicillins] Shortness of Breath     Past Medical History:   Diagnosis Date    Allergic rhinitis     Benign fundic gland polyps of stomach 09/15/2020    Dr Mino Ramos    Carotid stenosis 06/2021    BICA<50%    COVID-19 virus infection 01/2021    urgent care    CVA (cerebral vascular accident) ([facility] [address] 06/01/2021    mri showed acute R basal ganglia infarct; manifesting as left sided weakness/incoordination; Dr Solomon Whitney Dyslipidemia     calculated 10 year risk score was 6.7%(2/14); 7.7% (3/15); 7.9% (2/15); 6.3% (3/17); 9.3% (4/18); declined statin repeatedly; declined ca score    H/O echocardiogram 06/2021    echo showed ef 55%, mild cLVH, gr 1 dd, mod aor ropot dilation 4.5cm, mild LAE, neg bubble study    KAVYA (obstructive sleep apnea)     Dr. Scott Bibpatric Overweight(278.02)     Zoster 1990s    right T10     Past Surgical History:   Procedure Laterality Date    HX COLONOSCOPY      Dr Susy Laura 7/16/10 neg; Dr Liana Junior 9/15/20 neg    [id number] [facility]  11/08    echo nl lv, ef 55%    [place] CARDIAC SURG PROCEDURE UNLIST  2007    thallium negative     No family history on file. Social History     Tobacco Use   Smoking Status Never Smoker   Smokeless Tobacco Never Used          Review of Systems, additional:  Constitutional: negative  Eyes: negative  Respiratory: negative  Cardiovascular: negative  Gastrointestinal: negative  Musculoskeletal:negative  Neurological: negative  Behvioral/Psych: negative  Endocrine: negative  ENT: negative    Objective:     Visit Vitals  /70 (BP 1 Location: Left upper arm, BP Patient Position: Sitting, BP Cuff Size: Adult)   Pulse (!) 59   Ht 6' 1\" (1.854 m)   Wt 93 kg (205 lb)   SpO2 98%   BMI 27.05 kg/m²     General:  alert, cooperative, no distress   Chest Wall: inspection normal - no chest wall deformities or tenderness, respiratory effort normal   Lung: clear to auscultation bilaterally   Heart:  normal rate and regular rhythm, S1 and S2 normal, no murmurs noted, no gallops noted, no JVD   Abdomen: soft, non-tender.  Bowel sounds normal. No masses,  no organomegaly   Extremities: extremities normal, atraumatic, no cyanosis or edema Skin: no rashes   Neuro: alert, oriented, normal speech, no focal findings or movement disorder noted EKG:/29/21. Sinus bradycardia. RSR V1. Lateral Q waves noted    Assessment/Plan:       ICD-10-CM ICD-9-CM    1. Acute stroke due to ischemia ([facility] [address] 6/1/2021. Acute right basal ganglia CVA. Patient on Plavix and aspirin 81 mg as well as atorvastatin. The patient kept a blood pressure log with acceptable BPs. Return in 3 months. I63.9 434.91 AMB POC EKG ROUTINE W/ 12 LEADS, INTER & REP   2. Obstructive sleep apnea syndrome , on CPAP G47.33 327.23    3. Dyslipidemia , total cholesterol 205, triglycerides 113, HDL 38, .4, VLDL 22.6.  (6/2/2021) E78.5 272.4    4. LVH (left ventricular hypertrophy) , mild by echo 06/2021 I51.7 429.3    5. Bilateral carotid artery stenosis, mild bilateral ICA stenoses I65.23 433.10    6       Aortic root enlargement, 4.5 cm.   Will arrange for update of aortic measurements at the next visit.  433.30

## 2021-10-14 PROBLEM — Z86.73 H/O: STROKE: Status: ACTIVE | Noted: 2021-06-01

## 2021-10-14 LAB
HEMOCCULT STL QL: NEGATIVE
VALID INTERNAL CONTROL?: YES

## 2021-12-06 DIAGNOSIS — E78.5 DYSLIPIDEMIA: ICD-10-CM

## 2021-12-08 DIAGNOSIS — D64.9 ANEMIA, UNSPECIFIED TYPE: ICD-10-CM

## 2021-12-08 DIAGNOSIS — E78.5 DYSLIPIDEMIA: ICD-10-CM

## 2021-12-14 ENCOUNTER — OFFICE VISIT (OUTPATIENT)
Dept: CARDIOLOGY CLINIC | Age: 61
End: 2021-12-14
Payer: COMMERCIAL

## 2021-12-14 VITALS
DIASTOLIC BLOOD PRESSURE: 62 MMHG | OXYGEN SATURATION: 98 % | HEART RATE: 65 BPM | BODY MASS INDEX: 26.37 KG/M2 | SYSTOLIC BLOOD PRESSURE: 122 MMHG | HEIGHT: 73 IN | WEIGHT: 199 LBS

## 2021-12-14 DIAGNOSIS — I63.9 ACUTE STROKE DUE TO ISCHEMIA (HCC): Primary | ICD-10-CM

## 2021-12-14 PROCEDURE — 99214 OFFICE O/P EST MOD 30 MIN: CPT | Performed by: INTERNAL MEDICINE

## 2021-12-14 NOTE — PROGRESS NOTES
Praful Blake presents today for   Chief Complaint   Patient presents with    Follow-up     3-4 months        Praful Blake preferred language for health care discussion is english/other. Is someone accompanying this pt? no    Is the patient using any DME equipment during [address]? no    Depression Screening:  3 most recent PHQ Screens 12/14/2021   Little interest or pleasure in doing things Not at all   Feeling down, depressed, irritable, or hopeless Not at all   Total Score PHQ 2 0       Learning Assessment:  Learning Assessment 6/29/2021   PRIMARY LEARNER Patient   HIGHEST LEVEL OF EDUCATION - PRIMARY LEARNER  -   BARRIERS PRIMARY LEARNER -   CO-LEARNER CAREGIVER -   PRIMARY LANGUAGE ENGLISH   LEARNER PREFERENCE PRIMARY DEMONSTRATION   ANSWERED BY patient   RELATIONSHIP SELF       Abuse Screening:  Abuse Screening Questionnaire 9/29/2021   Do you ever feel afraid of your partner? N   Are you in a relationship with someone who physically or mentally threatens you? N   Is it safe for you to go home? Y       Fall Risk  No flowsheet data found. Pt currently taking Anticoagulant therapy? ASA 81mg every day and Plavix     Coordination of Care:  1. Have you been to the ER, urgent care clinic since your last visit? Hospitalized since your last visit? no    2. Have you seen or consulted any other health care providers outside of the [address] [place] [address] since your last visit? Include any pap smears or colon screening.  no

## 2021-12-17 NOTE — PROGRESS NOTES
Subjective:      Yola Anderson is in the office today for cardiac reevaluation. He was  hospitalized at [facility] for an acute stroke.  (6/1/2021). His evaluation was remarkable for an acute right basal ganglia infarct. An echocardiogram was done with the findings of normal systolic function and mild LVH. EF was 55 to 60%. There was grade 1 left ventricular diastolic dysfunction. Additionally, there was an enlarged aortic root at 4.5 cm. The patient's symptoms at the time of his presentation were left arm numbness and a speech disturbance with the feeling that his left arm would not take commands from his brain. He has had no cardiac history. He has had no chest pain. He has had no shortness of breath. His stroke symptoms have gradually improved. Blood pressures have been acceptable. He has no specific complaints in the office today. He has had no palpitations. He informs me that he is supposed to have a Holter monitor. Patient's cardiac risk factors are dyslipidemia, hypertension. Patient Active Problem List    Diagnosis Date Noted    LVH (left ventricular hypertrophy) 07/09/2021    Bilateral carotid artery stenosis 07/09/2021    H/O: stroke 06/01/2021    Overweight with body mass index (BMI) of 25 to 25.9 in adult 03/13/2017    Dyslipidemia 02/04/2014    Sleep apnea Dr. Violette Pinto      Current Outpatient Medications   Medication Sig Dispense Refill    clopidogreL (PLAVIX) 75 mg tab Take 1 Tablet by mouth daily for 180 days. 90 Tablet 1    aspirin 81 mg chewable tablet Take 1 Tablet by mouth daily. 30 Tablet 0    atorvastatin (LIPITOR) 80 mg tablet Take 1 Tablet by mouth nightly.  30 Tablet 0     Allergies   Allergen Reactions    Pcn [Penicillins] Shortness of Breath     Past Medical History:   Diagnosis Date    Allergic rhinitis     Benign fundic gland polyps of stomach 09/15/2020    Dr Tobias Jackman    Carotid stenosis 06/2021    BICA<50%    COVID-19 virus infection 01/2021    urgent care    CVA (cerebral vascular accident) ([facility] [address] 06/01/2021    mri showed acute R basal ganglia infarct; manifesting as left sided weakness/incoordination; Dr Shaka Cummings Dyslipidemia     calculated 10 year risk score was 6.7%(2/14); 7.7% (3/15); 7.9% (2/15); 6.3% (3/17); 9.3% (4/18); declined statin repeatedly; declined ca score    H/O echocardiogram 06/2021    echo showed ef 55%, mild cLVH, gr 1 dd, mod aor ropot dilation 4.5cm, mild LAE, neg bubble study    KAVYA (obstructive sleep apnea)     Dr. Dar Kline Overweight(278.02)     Zoster 1990s    right T10     Past Surgical History:   Procedure Laterality Date    HX COLONOSCOPY      Dr Christina Stevens 7/16/10 neg; Dr Josie Davis 9/15/20 neg    [address] [facility]  11/08    echo nl lv, ef 55%    [place] CARDIAC SURG PROCEDURE UNLIST  2007    thallium negative     No family history on file. Social History     Tobacco Use   Smoking Status Never Smoker   Smokeless Tobacco Never Used          Review of Systems, additional:  Constitutional: negative  Eyes: negative  Respiratory: negative  Cardiovascular: negative  Gastrointestinal: negative  Musculoskeletal:negative  Neurological: negative  Behvioral/Psych: negative  Endocrine: negative  ENT: negative    Objective:     Visit Vitals  /62 (BP 1 Location: Right arm, BP Patient Position: Sitting, BP Cuff Size: Adult)   Pulse 65   Ht 6' 1\" (1.854 m)   Wt 90.3 kg (199 lb)   SpO2 98%   BMI 26.25 kg/m²     General:  alert, cooperative, no distress   Chest Wall: inspection normal - no chest wall deformities or tenderness, respiratory effort normal   Lung: clear to auscultation bilaterally   Heart:  normal rate and regular rhythm, S1 and S2 normal, no murmurs noted, no gallops noted, no JVD   Abdomen: soft, non-tender.  Bowel sounds normal. No masses,  no organomegaly   Extremities: extremities normal, atraumatic, no cyanosis or edema Skin: no rashes   Neuro: alert, oriented, normal speech, no focal findings or movement disorder noted     EKG:/29/21. Sinus bradycardia. RSR V1. Lateral Q waves noted    Assessment/Plan:       ICD-10-CM ICD-9-CM    1. Acute stroke due to ischemia ([facility] [address] 6/1/2021. Acute right basal ganglia CVA. Patient on Plavix and aspirin 81 mg as well as atorvastatin. Arrange for Holter monitor. Patient can call for results. We will have patient return in 6 months. I63.9 434.91 AMB POC EKG ROUTINE W/ 12 LEADS, INTER & REP   2. Obstructive sleep apnea syndrome , on CPAP G47.33 327.23    3. Dyslipidemia , total cholesterol 114, triglycerides 62, HDL 34, , VLDL 12.  (9/17/2021). Would consider switch to Crestor in light of patient's low HDL E78.5 272.4    4. LVH (left ventricular hypertrophy) , mild by echo 06/2021 I51.7 429.3    5. Bilateral carotid artery stenosis, mild bilateral ICA stenoses I65.23 433.10    6       Aortic root enlargement, 4.5 cm. Will arrange for update of aortic measurements at 1 year post last CT or sometime around June 2022. If the aortic root is 4.5 cm or greater we will switch to every 6 month surveillance.   433.30

## 2022-01-05 ENCOUNTER — TELEPHONE (OUTPATIENT)
Dept: CARDIOLOGY CLINIC | Age: 62
End: 2022-01-05

## 2022-01-07 ENCOUNTER — OFFICE VISIT (OUTPATIENT)
Dept: INTERNAL MEDICINE CLINIC | Age: 62
End: 2022-01-07
Payer: COMMERCIAL

## 2022-01-07 ENCOUNTER — TELEPHONE (OUTPATIENT)
Dept: INTERNAL MEDICINE CLINIC | Age: 62
End: 2022-01-07

## 2022-01-07 VITALS
OXYGEN SATURATION: 99 % | HEART RATE: 53 BPM | WEIGHT: 201 LBS | RESPIRATION RATE: 16 BRPM | SYSTOLIC BLOOD PRESSURE: 142 MMHG | TEMPERATURE: 97.5 F | DIASTOLIC BLOOD PRESSURE: 82 MMHG | BODY MASS INDEX: 26.64 KG/M2 | HEIGHT: 73 IN

## 2022-01-07 DIAGNOSIS — E78.5 DYSLIPIDEMIA: ICD-10-CM

## 2022-01-07 DIAGNOSIS — G47.33 OBSTRUCTIVE SLEEP APNEA SYNDROME: ICD-10-CM

## 2022-01-07 DIAGNOSIS — Z86.73 H/O: STROKE: Primary | ICD-10-CM

## 2022-01-07 PROCEDURE — 99214 OFFICE O/P EST MOD 30 MIN: CPT | Performed by: INTERNAL MEDICINE

## 2022-01-07 RX ORDER — CHLORHEXIDINE GLUCONATE 1.2 MG/ML
RINSE ORAL
COMMUNITY
Start: 2021-12-27 | End: 2022-08-30

## 2022-01-07 RX ORDER — CLOPIDOGREL BISULFATE 75 MG/1
75 TABLET ORAL DAILY
COMMUNITY

## 2022-01-07 NOTE — PROGRESS NOTES
57 y.o. WHITE/NON- male who presents for evaluation. He is here to get a form filled out to go back to work. He has been out after his stroke, no recurrence of symptoms. He has not followed up with Dr. Ana Pavon but continues to see Dr. Gary Will. No cardiovascular complaints.     Past Medical History:   Diagnosis Date    Allergic rhinitis     Benign fundic gland polyps of stomach 09/15/2020    Dr Yoana Foreman    Carotid stenosis 06/2021    BICA<50%    COVID-19 virus infection 01/2021    urgent care    CVA (cerebral vascular accident) ([facility] [address] 06/01/2021    mri showed acute R basal ganglia infarct; manifesting as left sided weakness/incoordination; Dr Ursula Romano Dyslipidemia     calculated 10 year risk score was 6.7%(2/14); 7.7% (3/15); 7.9% (2/15); 6.3% (3/17); 9.3% (4/18); declined statin repeatedly; declined ca score    H/O echocardiogram 06/2021    echo showed ef 55%, mild cLVH, gr 1 dd, mod aor ropot dilation 4.5cm, mild LAE, neg bubble study    KAVYA (obstructive sleep apnea)     Dr. Shanique Molina Overweight(278.02)     Zoster 1990s    right T10     Past Surgical History:   Procedure Laterality Date    HX COLONOSCOPY      Dr Lois Hinton 7/16/10 neg; Dr Yoana Foreman 9/15/20 neg    [place] CARDIAC SURG PROCEDURE UNLIST  11/08    echo nl lv, ef 55%    [place] CARDIAC SUR[place] PROCEDURE UNLIST  2007    thallium negative     Social History     Socioeconomic History    Marital status:      Spouse name: Not on file    Number of children: 2    Years of education: Not on file    Highest education level: Not on file   Occupational History    Occupation:    Tobacco Use    Smoking status: Never Smoker    Smokeless tobacco: Never Used   Substance and Sexual Activity    Alcohol use: No    Drug use: No    Sexual activity: Not on file   Other Topics Concern    Not on file   Social History Narrative    Not on file     Social Determinants of Health     Financial Resource Strain:     Difficulty of Paying Living Expenses: Not on file   Food Insecurity:     Worried About Running Out of Food in the Last Year: Not on file    Rogelio of Food in the Last Year: Not on file   Transportation Needs:     Lack of Transportation (Medical): Not on file    Lack of Transportation (Non-Medical): Not on file   Physical Activity:     Days of Exercise per Week: Not on file    Minutes of Exercise per Session: Not on file   Stress:     Feeling of Stress : Not on file   Social Connections:     Frequency of Communication with Friends and Family: Not on file    Frequency of Social Gatherings with Friends and Family: Not on file    Attends [employer]ism Services: Not on file    Active Member of [address] [place] [address] [employer] or Organizations: Not on file    Attends Club or Organization Meetings: Not on file    Marital Status: Not on file   Intimate Partner Violence:     Fear of Current or Ex-Partner: Not on file    Emotionally Abused: Not on file    Physically Abused: Not on file    Sexually Abused: Not on file   Housing Stability:     Unable to Pay for Housing in the Last Year: Not on file    Number of [place] in the Last Year: Not on file    Unstable Housing in the Last Year: Not on file     Current Outpatient Medications   Medication Sig    chlorhexidine (PERIDEX) 0.12 % solution GENTLY SOAK AREA WITH 1 CAPFUL THREE TIMES DAILY UNTIL GONE STARTING NIGHT OF SURGERY    clopidogreL (Plavix) 75 mg tab Take 75 mg by mouth daily.  aspirin 81 mg chewable tablet Take 1 Tablet by mouth daily.  atorvastatin (LIPITOR) 80 mg tablet Take 1 Tablet by mouth nightly. (Patient not taking: Reported on 1/7/2022)     No current facility-administered medications for this visit. Allergies   Allergen Reactions    Pcn [Penicillins] Shortness of Breath     Visit Vitals  BP (!) 142/82   Pulse (!) 53   Temp 97.5 °F (36.4 °C) (Temporal)   Resp 16   Ht 6' 1\" (1.854 m)   Wt 201 lb (91.2 kg)   SpO2 99%   BMI 26.52 kg/m²   A&O x3  Affect is appropriate.   Mood stable  No apparent distress  Anicteric, no JVD, adenopathy or thyromegaly. No carotid bruits or radiated murmur  Lungs clear to auscultation, no wheezes or rales  Heart showed regular rate and rhythm. No murmur, rubs, gallops  Abdomen soft nontender, no hepatosplenomegaly or masses. Extremities without edema. Pulses 1-2+ symmetrically    Assessment and plan:  1. Status post CVA. On aspirin alone, follow-up Dr Manny Kirkland  2. Dyslipidemia. Continue Lipitor  3. Elevated blood pressure. He will follow readings at home and call us back with an update  4. Form to be filled out once we have it      Above conditions discussed at length and patient vocalized understanding. All questions answered to patient satisfaction      ICD-10-CM ICD-9-CM    1. H/O: stroke  Z86.73 V12.54    2. Dyslipidemia  E78.5 272.4    3.  Obstructive sleep apnea syndrome  G47.33 327.23

## 2022-01-07 NOTE — TELEPHONE ENCOUNTER
Wasn't he supposed to bring me in a copy of the form?  The original one is gone he gave previously is gone

## 2022-01-07 NOTE — PROGRESS NOTES
Dre Melgar presents with   Chief Complaint   Patient presents with    Form Completion            1. \"Have you been to the ER, urgent care clinic since your last visit? Hospitalized since your last visit? \" NO    2. \"Have you seen or consulted any other health care providers outside of the [address] [place] [address] since your last visit? \" NO    3. For patients aged 39-70: Has the patient had a colonoscopy? Yes, HM satisfied with blue hyperlink     If the patient is female:    4. For patients aged 41-77: Has the patient had a mammogram within the past 2 years? NA based on age or sex    11. For patients aged 21-65: Has the patient had a pap smear?  NA based on age or sex

## 2022-01-07 NOTE — TELEPHONE ENCOUNTER
Wife is calling sating he has been out of work for 6 months and needs a note from Dr. Basil Harden stating he is fit to drive and can return to work.

## 2022-01-11 ENCOUNTER — TELEPHONE (OUTPATIENT)
Dept: INTERNAL MEDICINE CLINIC | Age: 62
End: 2022-01-11

## 2022-01-11 DIAGNOSIS — E78.5 DYSLIPIDEMIA: Primary | ICD-10-CM

## 2022-01-11 DIAGNOSIS — Z86.73 H/O: STROKE: ICD-10-CM

## 2022-01-11 RX ORDER — ATORVASTATIN CALCIUM 80 MG/1
80 TABLET, FILM COATED ORAL
Qty: 30 TABLET | Refills: 0 | Status: SHIPPED | OUTPATIENT
Start: 2022-01-11 | End: 2022-01-12

## 2022-01-11 RX ORDER — ASPIRIN 325 MG
325 TABLET ORAL DAILY
Qty: 90 TABLET | Refills: 3 | Status: SHIPPED | OUTPATIENT
Start: 2022-01-11

## 2022-01-11 NOTE — TELEPHONE ENCOUNTER
Called wife to let her know papers are at the  to  to return to work. Says patient does seem to be having some issues with memory the last couple days. She says he isn't as energetic, Yesterday he said he thinks he needs medicine which is really out of character for him. He also started playing his game they gave him after his stroke like he is trying to remember things. He is very private and doesn't tell her a lot. Says this just isn't him. She isn't sure what is going on. She isn't sure if he had a mini stroke. Wants to know if he should be on cholesterol meds? What would RD advise?

## 2022-01-11 NOTE — TELEPHONE ENCOUNTER
Spoke with Matthew Spears and she is aware of message and verbalizes understanding. Matthew Spears states patient is not on Atorvastatin 80 mg since October 2021. Ms. Glen Triplett states if patient needs to be on this please call in refill. Ms. Glen Triplett states patient is on Clopidogrel 75 mg daily, and wants to make sure patient should continue this medication.

## 2022-01-11 NOTE — TELEPHONE ENCOUNTER
Ms. Nay Conley is aware of message and states she will have patient stop plavix(clopedigril) and follow up with Neurology.

## 2022-01-11 NOTE — TELEPHONE ENCOUNTER
What is he doing?     He is on lipitor - FOREVER    He was supposed to go on asa 325 and drop the plavix, not the other way around  If he had followed up w neuro as directed in Nov then there would've been no confusion    Asa 325 every day sent - take forever  lipitor refilled

## 2022-01-11 NOTE — TELEPHONE ENCOUNTER
Sx are vague - not c/w stroke  But if they are concerned about it, the only was to answer is to be evaluated - would suggest ER as he would need CT asap    If he's having memory problems, would suggest reevaluation with neurologist dr Mercie Schwab    He's supposed to be on atorvastatin 80

## 2022-01-12 DIAGNOSIS — E78.5 DYSLIPIDEMIA: ICD-10-CM

## 2022-01-12 DIAGNOSIS — Z86.73 H/O: STROKE: ICD-10-CM

## 2022-01-12 RX ORDER — ATORVASTATIN CALCIUM 80 MG/1
TABLET, FILM COATED ORAL
Qty: 90 TABLET | Refills: 3 | Status: SHIPPED | OUTPATIENT
Start: 2022-01-12

## 2022-01-20 DIAGNOSIS — I63.9 ACUTE STROKE DUE TO ISCHEMIA (HCC): ICD-10-CM

## 2022-03-18 PROBLEM — Z86.73 H/O: STROKE: Status: ACTIVE | Noted: 2021-06-01

## 2022-03-18 PROBLEM — I65.23 BILATERAL CAROTID ARTERY STENOSIS: Status: ACTIVE | Noted: 2021-07-09

## 2022-03-19 PROBLEM — E66.3 OVERWEIGHT WITH BODY MASS INDEX (BMI) OF 25 TO 25.9 IN ADULT: Status: ACTIVE | Noted: 2017-03-13

## 2022-03-20 PROBLEM — I51.7 LVH (LEFT VENTRICULAR HYPERTROPHY): Status: ACTIVE | Noted: 2021-07-09

## 2022-04-13 DIAGNOSIS — E78.5 DYSLIPIDEMIA: ICD-10-CM

## 2022-07-18 ENCOUNTER — TELEPHONE (OUTPATIENT)
Dept: INTERNAL MEDICINE CLINIC | Age: 62
End: 2022-07-18

## 2022-07-18 NOTE — TELEPHONE ENCOUNTER
Pt calling, says he had a minor stroke about a year ago. Wants to know if he needs to still see Dr. Shey Gallagher? Says he doesn't really see a reason to keep seeing him if he isn't having issues.

## 2022-08-12 ENCOUNTER — OFFICE VISIT (OUTPATIENT)
Dept: CARDIOLOGY CLINIC | Age: 62
End: 2022-08-12
Payer: COMMERCIAL

## 2022-08-12 DIAGNOSIS — I51.7 LVH (LEFT VENTRICULAR HYPERTROPHY): Primary | ICD-10-CM

## 2022-08-12 PROCEDURE — 99214 OFFICE O/P EST MOD 30 MIN: CPT | Performed by: INTERNAL MEDICINE

## 2022-08-12 PROCEDURE — 93000 ELECTROCARDIOGRAM COMPLETE: CPT | Performed by: INTERNAL MEDICINE

## 2022-08-12 NOTE — PROGRESS NOTES
Identified pt with two pt identifiers(name and ). Reviewed record in preparation for visit and have obtained necessary documentation. Adam Blake presents today for   Chief Complaint   Patient presents with    Follow-up       Pt c/o DIZZINESS, SOB, CHEST PAIN/ PRESSURE, FATIGUE/WEAKNESS, HEADACHES, SWELLING. Adam Blake preferred language for health care discussion is english/other. Personal Protective Equipment:   Personal Protective Equipment was used including: mask-surgical and hands-gloves. Patient was placed on no precaution(s). Patient was masked. Precautions:   Patient currently on None  Patient currently roomed with door closed. Is someone accompanying this pt? no    Is the patient using any DME equipment during [address]? no    Depression Screening:  3 most recent PHQ Screens 2022   Little interest or pleasure in doing things Not at all   Feeling down, depressed, irritable, or hopeless Not at all   Total Score PHQ 2 0       Learning Assessment:  Learning Assessment 2021   PRIMARY LEARNER Patient   HIGHEST LEVEL OF EDUCATION - PRIMARY LEARNER  -   BARRIERS PRIMARY LEARNER -   CO-LEARNER CAREGIVER -   PRIMARY LANGUAGE ENGLISH   LEARNER PREFERENCE PRIMARY DEMONSTRATION   ANSWERED BY patient   RELATIONSHIP SELF       Abuse Screening:  Abuse Screening Questionnaire 2022   Do you ever feel afraid of your partner? N   Are you in a relationship with someone who physically or mentally threatens you? N   Is it safe for you to go home? Y       Fall Risk  No flowsheet data found. Pt currently taking Anticoagulant therapy? no  Pt currently taking Antiplatelet therapy? no    Coordination of Care:  1. Have you been to the ER, urgent care clinic since your last visit? Hospitalized since your last visit? no    2. Have you seen or consulted any other health care providers outside of the [address] [place] [address] since your last visit?  Include any pap smears or colon screening. no      Please see Red banners under Allergies and Med Rec to remove outside inquires. All correct information has been verified with patient and added to chart.      Medication's patient's would liked removed has been marked not taking to be removed per Verbal order and read back per Saurabh Gudino MD

## 2022-08-19 NOTE — PROGRESS NOTES
Subjective:      Darnell Alatorre is in the office today for cardiac reevaluation. He was  hospitalized at [facility] for an acute stroke.  (6/1/2021). His evaluation was remarkable for an acute right basal ganglia infarct. An echocardiogram was done with the findings of normal systolic function and mild LVH. EF was 55 to 60%. There was grade 1 left ventricular diastolic dysfunction. Additionally, there was an enlarged aortic root at 4.5 cm. The patient's symptoms at the time of his presentation were left arm numbness and a speech disturbance with the feeling that his left arm would not take commands from his brain. He has had no cardiac history. He has had no chest pain. He has had no shortness of breath. His stroke symptoms have gradually improved. Blood pressures have been acceptable. He has no specific complaints in the office today. He reports he is doing \"fine \". He has had no palpitations. Holter that was ordered at the last appointment did not show any evidence for AF. Patient's cardiac risk factors are dyslipidemia, hypertension. Patient Active Problem List    Diagnosis Date Noted    LVH (left ventricular hypertrophy) 07/09/2021    Bilateral carotid artery stenosis 07/09/2021    H/O: stroke 06/01/2021    Overweight with body mass index (BMI) of 25 to 25.9 in adult 03/13/2017    Dyslipidemia 02/04/2014    Sleep apnea Dr. Eboni Camilo      Current Outpatient Medications   Medication Sig Dispense Refill    atorvastatin (LIPITOR) 80 mg tablet TAKE 1 TABLET BY MOUTH EVERY NIGHT 90 Tablet 3    aspirin (ASPIRIN) 325 mg tablet Take 1 Tablet by mouth daily. (Patient not taking: Reported on 8/12/2022) 90 Tablet 3    chlorhexidine (PERIDEX) 0.12 % solution GENTLY SOAK AREA WITH 1 CAPFUL THREE TIMES DAILY UNTIL GONE STARTING NIGHT OF SURGERY (Patient not taking: Reported on 8/12/2022)      clopidogreL (PLAVIX) 75 mg tab Take 75 mg by mouth daily.  (Patient not taking: Reported on 8/12/2022)       Allergies Allergen Reactions    Pcn [Penicillins] Shortness of Breath     Past Medical History:   Diagnosis Date    Allergic rhinitis     Benign fundic gland polyps of stomach 09/15/2020    Dr Courtney Boyle    Carotid stenosis 06/2021    BICA<50%    COVID-19 virus infection 01/2021    urgent care    CVA (cerebral vascular accident) ([facility] [address] 06/01/2021    mri showed acute R basal ganglia infarct; manifesting as left sided weakness/incoordination; Dr Norma Vasques    Dyslipidemia     calculated 10 year risk score was 6.7%(2/14); 7.7% (3/15); 7.9% (2/15); 6.3% (3/17); 9.3% (4/18); declined statin repeatedly; declined ca score    H/O echocardiogram 06/2021    echo showed ef 55%, mild cLVH, gr 1 dd, mod aor ropot dilation 4.5cm, mild LAE, neg bubble study    KAVYA (obstructive sleep apnea)     Dr. Lucy Sy    Overweight(278.02)     Zoster 1990s    right T10     Past Surgical History:   Procedure Laterality Date    HX COLONOSCOPY      Dr Shelly Knutson 7/16/10 neg; Dr Courtney Boyle 9/15/20 neg    [place] CARDIAC SURG PROCEDURE UNLIST  11/08    echo nl lv, ef 55%    [place] CARDIAC SURG PROCEDURE UNLIST  2007    thallium negative     No family history on file. Social History     Tobacco Use   Smoking Status Never   Smokeless Tobacco Never          Review of Systems, additional:  Constitutional: negative  Eyes: negative  Respiratory: negative  Cardiovascular: negative  Gastrointestinal: negative  Musculoskeletal:negative  Neurological: negative  Behvioral/Psych: negative  Endocrine: negative  ENT: negative    Objective: There were no vitals taken for this visit. General:  alert, cooperative, no distress   Chest Wall: inspection normal - no chest wall deformities or tenderness, respiratory effort normal   Lung: clear to auscultation bilaterally   Heart:  normal rate and regular rhythm, S1 and S2 normal, no murmurs noted, no gallops noted, no JVD   Abdomen: soft, non-tender.  Bowel sounds normal. No masses,  no organomegaly   Extremities: extremities normal, atraumatic, no cyanosis or edema Skin: no rashes   Neuro: alert, oriented, normal speech, no focal findings or movement disorder noted     EKG:/29/21. Sinus bradycardia. RSR V1. Lateral Q waves noted    Assessment/Plan:       ICD-10-CM ICD-9-CM    1. Acute stroke due to ischemia ([facility] [address] 6/1/2021. Acute right basal ganglia CVA. Patient on Plavix and aspirin 81 mg as well as atorvastatin. Holter monitor ordered and completed on 12/19/2021. Sinus rhythm average heart rate 66 bpm.  No AF. No pauses. Minimal ectopy. .  Patient can call for results. We will have patient return in 12 months. I63.9 434.91 AMB POC EKG ROUTINE W/ 12 LEADS, INTER & REP   2. Obstructive sleep apnea syndrome , on CPAP G47.33 327.23    3. Dyslipidemia , total cholesterol 114, triglycerides 62, HDL 34, , VLDL 12.  (9/17/2021). Would consider switch to Crestor in light of patient's low HDL E78.5 272.4    4. LVH (left ventricular hypertrophy) , mild by echo 06/2021 I51.7 429.3    5. Bilateral carotid artery stenosis, mild bilateral ICA stenoses I65.23 433.10    6       Aortic root enlargement, 4.5 cm. Will arrange for update of aortic measurements at next appointment. If the aortic root is 4.5 cm or greater we will switch to every 6 month surveillance. Patient is not interested in pursuing the scan anytime soon.   433.30

## 2022-08-22 ENCOUNTER — HOSPITAL ENCOUNTER (OUTPATIENT)
Dept: LAB | Age: 62
Discharge: HOME | End: 2022-08-22

## 2022-08-22 LAB — SENTARA SPECIMEN COL,SENBCF: NORMAL

## 2022-08-22 PROCEDURE — 99001 SPECIMEN HANDLING PT-LAB: CPT

## 2022-08-23 LAB
CHOLEST SERPL-MCNC: 136 MG/DL (ref 110–200)
HDLC SERPL-MCNC: 3.7 MG/DL (ref 0–5)
HDLC SERPL-MCNC: 37 MG/DL
LDL/HDL RATIO,LDHD: 2.2
LDLC SERPL CALC-MCNC: 81 MG/DL (ref 50–99)
NON-HDL CHOLESTEROL, 011976: 99 MG/DL
TRIGL SERPL-MCNC: 88 MG/DL (ref 40–149)
VLDLC SERPL CALC-MCNC: 18 MG/DL (ref 8–30)

## 2022-08-25 NOTE — PROGRESS NOTES
60 y.o. WHITE/NON- male who presents for RPE. Wife was present for the evaluation    He has not followed up with Dr Bebe Gibson. He was supposed to finish out 3 mo dual antiplt therapy then change to asa but he is on plavix instead. 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 He has been watching the [employer] through the summer so not much exercise    Denied Gi or  complaints.  No bleeding to report    The sleep apnea is controlled and energy level is good    The allergies are controlled at this time    Past Medical History:   Diagnosis Date    Allergic rhinitis     Benign fundic gland polyps of stomach 09/15/2020    Dr Rosa Soria    Carotid stenosis 06/2021    BICA<50%    COVID-19 virus infection 01/2021    urgent care    CVA (cerebral vascular accident) ([facility] [address] 06/01/2021    mri showed acute R basal ganglia infarct; manifesting as left sided weakness/incoordination; Dr Rex Alaniz    Dyslipidemia     calculated 10 year risk score was 6.7%(2/14); 7.7% (3/15); 7.9% (2/15); 6.3% (3/17); 9.3% (4/18); declined statin repeatedly; declined ca score    H/O echocardiogram 06/2021    echo showed ef 55%, mild cLVH, gr 1 dd, mod aor ropot dilation 4.5cm, mild LAE, neg bubble study    KAVYA (obstructive sleep apnea)     Dr. Broderick Barton    Overweight(278.02)     Zoster 1990s    right T10     Past Surgical History:   Procedure Laterality Date    HX COLONOSCOPY      Dr Kingsley Boland 7/16/10 neg; Dr Rosa Soria 9/15/20 neg    [place] CARDIAC SURG PROCEDURE UNLIST  11/08    echo nl lv, ef 55%    [place] CARDIAC SURG PROCEDURE UNLIST  2007    thallium negative     Social History     Socioeconomic History    Marital status:      Spouse name: Not on file    Number of children: 2    Years of education: Not on file    Highest education level: Not on file   Occupational History    Occupation:    Tobacco Use    Smoking status: Never    Smokeless tobacco: Never   Substance and Sexual Activity    Alcohol use: No    Drug use: No    Sexual activity: Not on file   Other Topics Concern    Not on file   Social History Narrative    Not on file     Social Determinants of Health     Financial Resource Strain: Not on file   Food Insecurity: Not on file   Transportation Needs: Not on file   Physical Activity: Not on file   Stress: Not on file   Social Connections: Not on file   Intimate Partner Violence: Not on file   Housing Stability: Not on file      Current Outpatient Medications   Medication Sig    atorvastatin (LIPITOR) 80 mg tablet TAKE 1 TABLET BY MOUTH EVERY NIGHT    clopidogreL (PLAVIX) 75 mg tab Take 75 mg by mouth daily. aspirin (ASPIRIN) 325 mg tablet Take 1 Tablet by mouth daily. (Patient not taking: No sig reported)     No current facility-administered medications for this visit. Allergies   Allergen Reactions    Pcn [Penicillins] Shortness of Breath     REVIEW OF SYSTEMS: colo 9/20 Dr Samira Ledezma  Ophtho - no vision change or eye pain  Oral - no mouth pain, tongue or tooth problems  Ears - no hearing loss, ear pain, fullness, no swallowing problems  Cardiac - no CP, PND, orthopnea, edema, palpitations or syncope  Chest - no breast masses  Resp - no wheezing, chronic coughing, dyspnea  GI - no heartburn, nausea, vomiting, change in bowel habits, bleeding, hemorrhoids  Urinary - no dysuria, hematuria, flank pain, urgency, frequency    Visit Vitals  /83   Pulse 60   Temp 97.7 °F (36.5 °C) (Temporal)   Resp 16   Ht 6' 1\" (1.854 m)   Wt 193 lb (87.5 kg)   SpO2 97%   BMI 25.46 kg/m²     A&O x3  Affect is appropriate. Mood stable  No apparent distress  Anicteric, no JVD, adenopathy or thyromegaly. No carotid bruits or radiated murmur  Lungs clear to auscultation, no wheezes or rales  Heart showed regular rate and rhythm. No murmur, rubs, gallops  Abdomen soft nontender, no hepatosplenomegaly or masses. Extremities without edema. Pulses 1-2+ symmetrically    LABS  From 5/10 showed   gluc 97, cr 0.90, gfr>60,            wbc 5.5, hb 12.9, plt 219  From 12/10 showed gluc 89, cr 0.90,    alt 52,         chol 207, tg 104, hdl 41, ldl-c 145,  wbc 5.8, hb 13.3, plt 295, psa 0.20  From 12/11 showed gluc 83, cr 0.82, gfr 102, alt 26, ldl-p 2121, chol 196, tg 163, hdl 36, ldl-c 139,  wbc 4.6, hb 13.0, plt 267, psa 0.30, tsh 3.51  From 7/12 showed       ldl-p 1614, chol 180, tg 92,   hdl 35, ldl-c 127  From 12/12 showed       ldl-p 1671, chol 159, tg 67,   hdl 41, ldl-c 105  From 2/14 showed   gluc 90, cr 0.90, gfr>60, alt 38,         chol 188, tg 86,   hdl 37, ldl-c 134,  wbc 4.9, hb 13.0, plt 337, psa 0.27, ua neg  From 2/15 showed   gluc 92, cr 0.90, gfr>60, alt 28,         chol 197, tg 127, hdl 38, ldl-c 133,  wbc 5.3, hb 12.9, plt 293, psa 0.20, ua neg  From 2/16 showed   gluc 85, cr 0.70, gfr>60, alt 43,         chol 198, tg 121, hdl 39, ldl-c 135,  wbc 5.5, hb 13.3, plt 305, psa 0.30, ua neg,   ast 44, ap 71, hep c-  From 3/17 showed              chol 175, tg 85,   hdl 41, ldl-c 117  From 4/18 showed   gluc 93, cr 0.80, gfr>60, alt 34,         chol 202, tg 113, hdl 37, ldl-c 143,  wbc 5.3, hb 12.7, plt 290,                tsh 3.00  From 4/19 showed                           wbc 4.4, hb 12.4, plt 268, psa 0.19  From 7/20 showed   gluc 93, cr 0.80, gfr>60, alt 30,           wbc 5.3, hb 12.9, plt 250, psa 0.20  Form 6/21 showed   gluc 99, cr 0.86, gfr>60,   hba1c 5.7, chol 205, tg 113, hdl 38, ldl-c 144,  wbc 5.4, hb 13.6, plt 249,        tsh 3.49  From 9/21 showed   gluc 94, cr 0.80, gfr>60, alt 47, hba1c 5.9, chol 114, tg 62,   hdl 34, ldl-c 68,    wbc 5.7, hb 12.1, plt 269, psa 0.29,     ast 48, ap 82, tb 0.5, fe 101, %sat 36, b12 81, fol 18.7, spep neg    Results for orders placed or performed in visit on 08/22/22   LIPID PANEL   Result Value Ref Range    Triglyceride 88 40 - 149 mg/dL    HDL Cholesterol 37 (L) >=40 mg/dL    Cholesterol, total 136 110 - 200 mg/dL    CHOLESTEROL/HDL 3.7 0.0 - 5.0    Non-HDL Cholesterol 99 <130 mg/dL    LDL, calculated 81 50 - 99 mg/dL    VLDL, calculated 18 8 - 30 mg/dL    LDL/HDL Ratio 2.2        We reviewed the patient's labs from the last several visits to point out trends in the numbers          Patient Active Problem List   Diagnosis Code    Sleep apnea Dr. Anabelle Quijano G47.30    Dyslipidemia E78.5    Overweight with body mass index (BMI) of 25 to 25.9 in adult E66.3, Z68.25    H/O: stroke Z86.73    LVH (left ventricular hypertrophy) I51.7    Bilateral carotid artery stenosis I65.23     ASSESSMENT AND PLAN:  1. Dyslipidemia. Continue lipitor; we discussed adding zetia vs inc exercise, he chose the latter and hopefully that will improve hdl as well  2. Kvng on cpap. F/u Dr. Anabelle Quijano   3. S/p R BG cva. Change to asa 325  4. Anemia. Negative evaluation, will continue to follow  5. Cardiology. Follow up Dr Sia Salazar  6. Transaminasemia. Recheck next draw and proceed with evaluation if persistent          RTC 1/23    Above conditions discussed at length and patient vocalized understanding. All questions answered to patient satisfaction          ICD-10-CM ICD-9-CM    1. Dyslipidemia  E78.5 272.4 CBC W/O DIFF      METABOLIC PANEL, COMPREHENSIVE      LIPID PANEL      PSA SCREENING (SCREENING)      2. H/O: stroke  Z86.73 V12.54       3.  Obstructive sleep apnea syndrome  G47.33 327.23

## 2022-08-30 ENCOUNTER — TELEPHONE (OUTPATIENT)
Dept: INTERNAL MEDICINE CLINIC | Age: 62
End: 2022-08-30

## 2022-08-30 ENCOUNTER — OFFICE VISIT (OUTPATIENT)
Dept: INTERNAL MEDICINE CLINIC | Age: 62
End: 2022-08-30
Payer: COMMERCIAL

## 2022-08-30 VITALS
HEART RATE: 60 BPM | SYSTOLIC BLOOD PRESSURE: 138 MMHG | DIASTOLIC BLOOD PRESSURE: 83 MMHG | HEIGHT: 73 IN | RESPIRATION RATE: 16 BRPM | TEMPERATURE: 97.7 F | OXYGEN SATURATION: 97 % | BODY MASS INDEX: 25.58 KG/M2 | WEIGHT: 193 LBS

## 2022-08-30 DIAGNOSIS — E78.5 DYSLIPIDEMIA: Primary | ICD-10-CM

## 2022-08-30 DIAGNOSIS — Z86.73 H/O: STROKE: ICD-10-CM

## 2022-08-30 DIAGNOSIS — G47.33 OBSTRUCTIVE SLEEP APNEA SYNDROME: ICD-10-CM

## 2022-08-30 PROCEDURE — 99214 OFFICE O/P EST MOD 30 MIN: CPT | Performed by: INTERNAL MEDICINE

## 2022-08-30 NOTE — PROGRESS NOTES
Tamela Manoj presents with   Chief Complaint   Patient presents with    Physical    Cholesterol Problem    Labs     8-23-22                  1. \"Have you been to the ER, urgent care clinic since your last visit? Hospitalized since your last visit? \" No    2. \"Have you seen or consulted any other health care providers outside of the [address] [place] [address] since your last visit? \" No     3. For patients aged 39-70: Has the patient had a colonoscopy / FIT/ Cologuard?  Yes - no Care Gap present

## 2022-08-30 NOTE — TELEPHONE ENCOUNTER
At check out pt did not want to schedule his next year appt.  Says he will call back when it is closer

## 2022-10-27 ENCOUNTER — TELEPHONE (OUTPATIENT)
Dept: INTERNAL MEDICINE CLINIC | Age: 62
End: 2022-10-27

## 2022-10-27 DIAGNOSIS — L98.9 SKIN LESION: Primary | ICD-10-CM

## 2022-10-28 NOTE — TELEPHONE ENCOUNTER
Patient contacted, patient identified with two identifiers (Name & ). Patient advised of referral, and given the phone number to contact.

## 2022-12-28 ENCOUNTER — HOSPITAL ENCOUNTER (OUTPATIENT)
Dept: LAB | Age: 62
Discharge: HOME | End: 2022-12-28

## 2022-12-28 LAB
A-G RATIO,AGRAT: 1.5 RATIO (ref 1.1–2.6)
ALBUMIN SERPL-MCNC: 4.2 G/DL (ref 3.5–5)
ALP SERPL-CCNC: 73 U/L (ref 40–125)
ALT SERPL-CCNC: 27 U/L (ref 5–40)
ANION GAP SERPL CALC-SCNC: 11 MMOL/L (ref 3–15)
AST SERPL W P-5'-P-CCNC: 27 U/L (ref 10–37)
BILIRUB SERPL-MCNC: 0.3 MG/DL (ref 0.2–1.2)
BUN SERPL-MCNC: 21 MG/DL (ref 6–22)
CALCIUM SERPL-MCNC: 10.1 MG/DL (ref 8.4–10.5)
CHLORIDE SERPL-SCNC: 106 MMOL/L (ref 98–110)
CHOLEST SERPL-MCNC: 223 MG/DL (ref 110–200)
CO2 SERPL-SCNC: 26 MMOL/L (ref 20–32)
CREAT SERPL-MCNC: 0.9 MG/DL (ref 0.8–1.6)
ERYTHROCYTE [DISTWIDTH] IN BLOOD BY AUTOMATED COUNT: 13.2 % (ref 10–15.5)
GLOBULIN,GLOB: 2.8 G/DL (ref 2–4)
GLOMERULAR FILTRATION RATE: >60 ML/MIN/1.73 SQ.M.
GLUCOSE SERPL-MCNC: 97 MG/DL (ref 70–99)
HCT VFR BLD AUTO: 43.2 % (ref 39.3–51.6)
HDLC SERPL-MCNC: 40 MG/DL
HDLC SERPL-MCNC: 5.6 MG/DL (ref 0–5)
HGB BLD-MCNC: 13.2 G/DL (ref 13.1–17.2)
LDL/HDL RATIO,LDHD: 3.9
LDLC SERPL CALC-MCNC: 156 MG/DL (ref 50–99)
MCH RBC QN AUTO: 30 PG (ref 26–34)
MCHC RBC AUTO-ENTMCNC: 31 G/DL (ref 31–36)
MCV RBC AUTO: 98 FL (ref 80–95)
NON-HDL CHOLESTEROL, 011976: 183 MG/DL
PLATELET # BLD AUTO: 303 K/UL (ref 140–440)
PMV BLD AUTO: 10.8 FL (ref 9–13)
POTASSIUM SERPL-SCNC: 5 MMOL/L (ref 3.5–5.5)
PROT SERPL-MCNC: 7 G/DL (ref 6.2–8.1)
PSA SERPL-MCNC: 1.46 NG/ML
RBC # BLD AUTO: 4.43 M/UL (ref 3.8–5.8)
SENTARA SPECIMEN COL,SENBCF: NORMAL
SODIUM SERPL-SCNC: 143 MMOL/L (ref 133–145)
TRIGL SERPL-MCNC: 136 MG/DL (ref 40–149)
VLDLC SERPL CALC-MCNC: 27 MG/DL (ref 8–30)
WBC # BLD AUTO: 5.8 K/UL (ref 4–11)

## 2022-12-28 PROCEDURE — 99001 SPECIMEN HANDLING PT-LAB: CPT

## 2023-01-01 NOTE — PROGRESS NOTES
60 y.o. WHITE/NON- male who presents for RPE. He has not followed up with Dr Amadeo Ayon but no new neurologic issues. He stopped taking lipitor for reasons unclear but is willing to start it back. Still no exercise mainly due to motivational issues  No cp, sob, pnd, edema, palpitations. Denied Gi or  complaints. The sleep apnea is controlled and energy level is good    The allergies are controlled at this time    He does feel somewhat depressed but denied any VI/hallucinations. Not happy with 'what he has not accomplished in life'.   Declined any therapy or meds, not really getting worse, just 'low grade' and he feels he will eventually snap out of his funk    Past Medical History:   Diagnosis Date    Allergic rhinitis     Benign fundic gland polyps of stomach 09/15/2020    Dr Poli Michelle    Carotid stenosis 06/2021    BICA<50%    COVID-19 vaccine series declined 01/2023    COVID-19 virus infection 01/2021    urgent care    CVA (cerebral vascular accident) ([facility] [address] 06/01/2021    mri showed acute R basal ganglia infarct; manifesting as left sided weakness/incoordination; Dr Pete Maldonado    Depression 01/2023    declined therapy or meds    Dyslipidemia     calculated 10 year risk score was 6.7%(2/14); 7.7% (3/15); 7.9% (2/15); 6.3% (3/17); 9.3% (4/18); declined statin repeatedly; declined ca score    H/O cardiovascular stress test     thallium neg (2007)    H/O echocardiogram     nl lv, ef 55% (11/08); ef 55%, mild cLVH, gr 1 dd, mod ao root dilation 4.5cm, mild LAE, neg bubble study (6/21)    KAVYA (obstructive sleep apnea)     Dr. Marcelo Turcios    Overweight(278.02)     Zoster 1990s    right T10     Past Surgical History:   Procedure Laterality Date    HX COLONOSCOPY      Dr Aakash Hirsch 7/16/10 neg; Dr Poli Michelle 9/15/20 neg     Social History     Socioeconomic History    Marital status:      Spouse name: Not on file    Number of children: 2    Years of education: Not on file    Highest education level: Not on file Occupational History    Occupation:    Tobacco Use    Smoking status: Never    Smokeless tobacco: Never   Substance and Sexual Activity    Alcohol use: No    Drug use: No    Sexual activity: Not on file   Other Topics Concern    Not on file   Social History Narrative    Not on file     Social Determinants of Health     Financial Resource Strain: Not on file   Food Insecurity: Not on file   Transportation Needs: Not on file   Physical Activity: Not on file   Stress: Not on file   Social Connections: Not on file   Intimate Partner Violence: Not on file   Housing Stability: Not on file      Current Outpatient Medications   Medication Sig    atorvastatin (LIPITOR) 80 mg tablet Take 1 Tablet by mouth nightly. aspirin (ASPIRIN) 325 mg tablet Take 1 Tablet by mouth daily. clopidogreL (PLAVIX) 75 mg tab Take 75 mg by mouth daily. (Patient not taking: Reported on 1/9/2023)     No current facility-administered medications for this visit. Allergies   Allergen Reactions    Pcn [Penicillins] Shortness of Breath     REVIEW OF SYSTEMS: colo 9/20 Dr Erica Farris  Ophtho - no vision change or eye pain  Oral - no mouth pain, tongue or tooth problems  Ears - no hearing loss, ear pain, fullness, no swallowing problems  Cardiac - no CP, PND, orthopnea, edema, palpitations or syncope  Chest - no breast masses  Resp - no wheezing, chronic coughing, dyspnea  GI - no heartburn, nausea, vomiting, change in bowel habits, bleeding, hemorrhoids  Urinary - no dysuria, hematuria, flank pain, urgency, frequency    Visit Vitals  /82   Pulse 65   Temp 97.6 °F (36.4 °C) (Temporal)   Resp 20   Ht 6' 1\" (1.854 m)   Wt 194 lb (88 kg)   SpO2 98%   BMI 25.60 kg/m²     A&O x3  Affect is appropriate. Mood stable  No apparent distress  Anicteric, no JVD, adenopathy or thyromegaly. No carotid bruits or radiated murmur  Lungs clear to auscultation, no wheezes or rales  Heart showed regular rate and rhythm.  No murmur, rubs, gallops  Abdomen soft nontender, no hepatosplenomegaly or masses. Extremities without edema.   Pulses 1-2+ symmetrically    LABS  From 5/10 showed   gluc 97, cr 0.90, gfr>60,            wbc 5.5, hb 12.9, plt 219  From 12/10 showed gluc 89, cr 0.90,    alt 52,         chol 207, tg 104, hdl 41, ldl-c 145, wbc 5.8, hb 13.3, plt 295, psa 0.20  From 12/11 showed gluc 83, cr 0.82, gfr 102, alt 26, ldl-p 2121, chol 196, tg 163, hdl 36, ldl-c 139, wbc 4.6, hb 13.0, plt 267, psa 0.30, tsh 3.51  From 7/12 showed       ldl-p 1614, chol 180, tg 92,   hdl 35, ldl-c 127  From 12/12 showed       ldl-p 1671, chol 159, tg 67,   hdl 41, ldl-c 105  From 2/14 showed   gluc 90, cr 0.90, gfr>60, alt 38,         chol 188, tg 86,   hdl 37, ldl-c 134, wbc 4.9, hb 13.0, plt 337, psa 0.27, ua neg  From 2/15 showed   gluc 92, cr 0.90, gfr>60, alt 28,         chol 197, tg 127, hdl 38, ldl-c 133, wbc 5.3, hb 12.9, plt 293, psa 0.20, ua neg  From 2/16 showed   gluc 85, cr 0.70, gfr>60, alt 43,         chol 198, tg 121, hdl 39, ldl-c 135, wbc 5.5, hb 13.3, plt 305, psa 0.30, ua neg,   ast 44, ap 71, hep c-  From 3/17 showed              chol 175, tg 85,   hdl 41, ldl-c 117  From 4/18 showed   gluc 93, cr 0.80, gfr>60, alt 34,         chol 202, tg 113, hdl 37, ldl-c 143, wbc 5.3, hb 12.7, plt 290,                tsh 3.00  From 4/19 showed                          wbc 4.4, hb 12.4, plt 268, psa 0.19  From 7/20 showed   gluc 93, cr 0.80, gfr>60, alt 30,          wbc 5.3, hb 12.9, plt 250, psa 0.20  Form 6/21 showed   gluc 99, cr 0.86, gfr>60,   hba1c 5.7, chol 205, tg 113, hdl 38, ldl-c 144, wbc 5.4, hb 13.6, plt 249,        tsh 3.49  From 9/21 showed   gluc 94, cr 0.80, gfr>60, alt 47, hba1c 5.9, chol 114, tg 62,   hdl 34, ldl-c 68,   wbc 5.7, hb 12.1, plt 269, psa 0.29,                ast 48, ap 82, tb 0.5, fe 101, %sat 36, b12 81, fol 18.7, spep neg  From 8/22 showed              chol 136, tg 88,   hdl 37, ldl-c 81  From 12/22 showed gluc 97, cr 0.90, gfr>60, alt 27,         chol 223, tg 136, hdl 40, ldl-c 156, wbc 5.7, hb 13.2, plt 303, psa 1.46    We reviewed the patient's labs from the last several visits to point out trends in the numbers          Patient Active Problem List   Diagnosis Code    Sleep apnea Dr. Best Lopez G47.30    Dyslipidemia E78.5    Overweight with body mass index (BMI) of 25 to 25.9 in adult E66.3, Z68.25    H/O: stroke Z86.73    LVH (left ventricular hypertrophy) I51.7    Bilateral carotid artery stenosis I65.23     ASSESSMENT AND PLAN:  1. Dyslipidemia. Restart lipitor and recheck in 6 mos; reminded him that optimal target ldl<70 and we actually were considering adding zetia at the last visit  2. Kvng on cpap. F/u Dr. Best Lopez   3. S/p R BG cva. Continue asa  4. Cardiology. Follow up Dr Carlos Acevedo  5. Declined covid, flu, shingrix  6. His form was filled out  7. Depression, mild. He is not interested in meds or therapy despite discussion. Call if he changes his mind  8. Covid, shingrix, flu advocated      RTC 7/23    Above conditions discussed at length and patient vocalized understanding. All questions answered to patient satisfaction          ICD-10-CM ICD-9-CM    1. Dyslipidemia  E78.5 272.4 atorvastatin (LIPITOR) 80 mg tablet      LIPID PANEL      2. H/O: stroke  Z86.73 V12.54 atorvastatin (LIPITOR) 80 mg tablet      3.  Rising PSA level  R97.20 790.93 PSA SCREENING (SCREENING)

## 2023-01-05 NOTE — PROGRESS NOTES
Ford Moreno presents today for   Chief Complaint   Patient presents with    Physical    Labs     12-28-22    Cholesterol Problem     Dyslipidemia    Sleep Problem     KAVYA     1. \"Have you been to the ER, urgent care clinic since your last visit? Hospitalized since your last visit? \" no    2. \"Have you seen or consulted any other health care providers outside of the [address] [place] [address] since your last visit? \" no     3. For patients aged 39-70: Has the patient had a colonoscopy / FIT/ Cologuard? Yes - no Care Gap present      If the patient is female:    4. For patients aged 41-77: Has the patient had a mammogram within the past 2 years? NA - based on age or sex  See top three    5. For patients aged 21-65: Has the patient had a pap smear?  NA - based on age or sex

## 2023-01-09 ENCOUNTER — OFFICE VISIT (OUTPATIENT)
Dept: INTERNAL MEDICINE CLINIC | Age: 63
End: 2023-01-09
Payer: COMMERCIAL

## 2023-01-09 VITALS
HEART RATE: 65 BPM | HEIGHT: 73 IN | WEIGHT: 194 LBS | TEMPERATURE: 97.6 F | BODY MASS INDEX: 25.71 KG/M2 | DIASTOLIC BLOOD PRESSURE: 82 MMHG | SYSTOLIC BLOOD PRESSURE: 135 MMHG | RESPIRATION RATE: 20 BRPM | OXYGEN SATURATION: 98 %

## 2023-01-09 DIAGNOSIS — R97.20 RISING PSA LEVEL: ICD-10-CM

## 2023-01-09 DIAGNOSIS — Z86.73 H/O: STROKE: ICD-10-CM

## 2023-01-09 DIAGNOSIS — E78.5 DYSLIPIDEMIA: Primary | ICD-10-CM

## 2023-01-09 PROCEDURE — 99396 PREV VISIT EST AGE 40-64: CPT | Performed by: INTERNAL MEDICINE

## 2023-01-09 RX ORDER — ATORVASTATIN CALCIUM 80 MG/1
80 TABLET, FILM COATED ORAL
Qty: 90 TABLET | Refills: 3 | Status: SHIPPED | OUTPATIENT
Start: 2023-01-09

## 2023-02-03 DIAGNOSIS — E78.5 DYSLIPIDEMIA: Primary | ICD-10-CM

## 2023-02-04 DIAGNOSIS — E78.5 DYSLIPIDEMIA: Primary | ICD-10-CM

## 2023-02-05 DIAGNOSIS — E78.5 DYSLIPIDEMIA: Primary | ICD-10-CM

## 2023-02-05 DIAGNOSIS — R97.20 RISING PSA LEVEL: Primary | ICD-10-CM

## 2023-02-27 DIAGNOSIS — E78.5 DYSLIPIDEMIA: ICD-10-CM

## 2023-02-28 DIAGNOSIS — E78.5 DYSLIPIDEMIA: ICD-10-CM

## 2023-07-09 DIAGNOSIS — E78.5 DYSLIPIDEMIA: ICD-10-CM

## 2023-07-09 DIAGNOSIS — R97.20 RISING PSA LEVEL: ICD-10-CM

## 2023-07-11 ENCOUNTER — TELEPHONE (OUTPATIENT)
Age: 63
End: 2023-07-11

## 2023-07-11 NOTE — TELEPHONE ENCOUNTER
TRIAGE   pt Bp is 190/89 pt wife darby it has been consistantly running high she states they are in [place] she said pt is not in distress she only wanted to sched an appt but was transferred to speak with the nurse , appt has been sched for 07/20 when pt returns from [place]   [phone]

## 2023-07-14 NOTE — TELEPHONE ENCOUNTER
Spoke with wife she stated everything is fine and they will have an appointment when they come back.

## 2023-07-20 ENCOUNTER — OFFICE VISIT (OUTPATIENT)
Age: 63
End: 2023-07-20
Payer: COMMERCIAL

## 2023-07-20 VITALS
TEMPERATURE: 97.8 F | HEIGHT: 73 IN | OXYGEN SATURATION: 98 % | RESPIRATION RATE: 18 BRPM | DIASTOLIC BLOOD PRESSURE: 90 MMHG | SYSTOLIC BLOOD PRESSURE: 128 MMHG | WEIGHT: 197 LBS | HEART RATE: 62 BPM | BODY MASS INDEX: 26.11 KG/M2

## 2023-07-20 DIAGNOSIS — I10 PRIMARY HYPERTENSION: Primary | ICD-10-CM

## 2023-07-20 PROCEDURE — 3080F DIAST BP >= 90 MM HG: CPT | Performed by: INTERNAL MEDICINE

## 2023-07-20 PROCEDURE — 99213 OFFICE O/P EST LOW 20 MIN: CPT | Performed by: INTERNAL MEDICINE

## 2023-07-20 PROCEDURE — 3074F SYST BP LT 130 MM HG: CPT | Performed by: INTERNAL MEDICINE

## 2023-07-20 RX ORDER — AMLODIPINE BESYLATE 2.5 MG/1
2.5 TABLET ORAL DAILY
Qty: 30 TABLET | Refills: 5 | Status: SHIPPED | OUTPATIENT
Start: 2023-07-20

## 2023-07-20 SDOH — ECONOMIC STABILITY: FOOD INSECURITY: WITHIN THE PAST 12 MONTHS, THE FOOD YOU BOUGHT JUST DIDN'T LAST AND YOU DIDN'T HAVE MONEY TO GET MORE.: NEVER TRUE

## 2023-07-20 SDOH — ECONOMIC STABILITY: HOUSING INSECURITY
IN THE LAST 12 MONTHS, WAS THERE A TIME WHEN YOU DID NOT HAVE A STEADY PLACE TO SLEEP OR SLEPT IN A SHELTER (INCLUDING NOW)?: NO

## 2023-07-20 SDOH — ECONOMIC STABILITY: INCOME INSECURITY: HOW HARD IS IT FOR YOU TO PAY FOR THE VERY BASICS LIKE FOOD, HOUSING, MEDICAL CARE, AND HEATING?: NOT HARD AT ALL

## 2023-07-20 SDOH — ECONOMIC STABILITY: FOOD INSECURITY: WITHIN THE PAST 12 MONTHS, YOU WORRIED THAT YOUR FOOD WOULD RUN OUT BEFORE YOU GOT MONEY TO BUY MORE.: NEVER TRUE

## 2023-09-01 ENCOUNTER — OFFICE VISIT (OUTPATIENT)
Age: 63
End: 2023-09-01
Payer: COMMERCIAL

## 2023-09-01 VITALS
HEART RATE: 69 BPM | SYSTOLIC BLOOD PRESSURE: 155 MMHG | HEIGHT: 73 IN | TEMPERATURE: 97.7 F | RESPIRATION RATE: 18 BRPM | DIASTOLIC BLOOD PRESSURE: 90 MMHG | BODY MASS INDEX: 25.31 KG/M2 | OXYGEN SATURATION: 98 % | WEIGHT: 191 LBS

## 2023-09-01 DIAGNOSIS — R21 RASH AND NONSPECIFIC SKIN ERUPTION: Primary | ICD-10-CM

## 2023-09-01 DIAGNOSIS — Z86.19 HISTORY OF SHINGLES: ICD-10-CM

## 2023-09-01 PROCEDURE — 3077F SYST BP >= 140 MM HG: CPT | Performed by: INTERNAL MEDICINE

## 2023-09-01 PROCEDURE — 99214 OFFICE O/P EST MOD 30 MIN: CPT | Performed by: INTERNAL MEDICINE

## 2023-09-01 PROCEDURE — 3080F DIAST BP >= 90 MM HG: CPT | Performed by: INTERNAL MEDICINE

## 2023-09-01 RX ORDER — VALACYCLOVIR HYDROCHLORIDE 1 G/1
1000 TABLET, FILM COATED ORAL 3 TIMES DAILY
Qty: 21 TABLET | Refills: 0 | Status: SHIPPED | OUTPATIENT
Start: 2023-09-01 | End: 2023-09-08

## 2023-09-01 ASSESSMENT — ENCOUNTER SYMPTOMS: BLOOD IN STOOL: 0

## 2023-09-01 ASSESSMENT — PATIENT HEALTH QUESTIONNAIRE - PHQ9
SUM OF ALL RESPONSES TO PHQ9 QUESTIONS 1 & 2: 0
SUM OF ALL RESPONSES TO PHQ QUESTIONS 1-9: 0
2. FEELING DOWN, DEPRESSED OR HOPELESS: 0
1. LITTLE INTEREST OR PLEASURE IN DOING THINGS: 0
SUM OF ALL RESPONSES TO PHQ QUESTIONS 1-9: 0

## 2023-09-01 NOTE — PROGRESS NOTES
Yarelis Timmons presents today for   Chief Complaint   Patient presents with    Skin Problem     Have history of shingles                 1. \"Have you been to the ER, urgent care clinic since your last visit? Hospitalized since your last visit? \" no    2. \"Have you seen or consulted any other health care providers outside of the [address] [place] [address] since your last visit? \" no     3. For patients aged 43-73: Has the patient had a colonoscopy / FIT/ Cologuard? Yes - no Care Gap present      If the patient is female:    4. For patients aged 43-66: Has the patient had a mammogram within the past 2 years? NA - based on age or sex      11. For patients aged 21-65: Has the patient had a pap smear?  NA - based on age or sex
Positive for rash. Psychiatric/Behavioral:  Negative for dysphoric mood. The patient is not nervous/anxious. Physical Exam  Constitutional:       General: He is not in acute distress. Appearance: He is not ill-appearing. Eyes:      General: No scleral icterus. Conjunctiva/sclera: Conjunctivae normal.   Neck:      Comments: No cervical or supraclavicular lymphadenopathy. Pulmonary:      Effort: Pulmonary effort is normal.      Breath sounds: Normal breath sounds. Abdominal:      Palpations: Abdomen is soft. Tenderness: There is no abdominal tenderness. There is no right CVA tenderness or left CVA tenderness. Musculoskeletal:      Comments: No clubbing, cyanosis, or edema. Calves nontender, no cords. Skin:     General: Skin is warm and dry. Comments: Vesicular linear eruption on a red base on right sacrum. Neurological:      Mental Status: He is alert and oriented to person, place, and time. Psychiatric:         Mood and Affect: Mood normal.                Majo Lopez was seen today for skin problem. Diagnoses and all orders for this visit:    Rash and nonspecific skin eruption  Comments:  Suggests herpetic or shingles. Swab of area sent to rule out HSV, empiric Valtrex, transmission precautions  Orders:  -     Culture, HSV, Reflex to typing; Future  -     Culture, HSV, Reflex to typing  -     valACYclovir (VALTREX) 1 g tablet; Take 1 tablet by mouth 3 times daily for 7 days    History of shingles  Comments:  Discussed comes from chickenpox virus and may occur more than once in an individual.  After current issues resolve, recommend Shingrix vaccine  Orders:  -     Culture, HSV, Reflex to typing; Future  -     Culture, HSV, Reflex to typing         No follow-up provider specified.          Dinh Daigle MD

## 2023-09-06 DIAGNOSIS — Z86.19 HISTORY OF SHINGLES: Primary | ICD-10-CM

## 2023-09-11 DIAGNOSIS — Z86.19 HISTORY OF SHINGLES: ICD-10-CM

## 2023-10-25 DIAGNOSIS — I10 PRIMARY HYPERTENSION: ICD-10-CM

## 2023-10-25 NOTE — TELEPHONE ENCOUNTER
Please fill or refuse as appropriate.     LOV: 9/1/23    NOV: Patient cancelled last 2 scheduled appointments    Last Refill: 7/20/23

## 2023-10-26 RX ORDER — AMLODIPINE BESYLATE 2.5 MG/1
2.5 TABLET ORAL DAILY
Qty: 90 TABLET | Refills: 3 | Status: SHIPPED | OUTPATIENT
Start: 2023-10-26

## 2023-12-06 ENCOUNTER — TELEPHONE (OUTPATIENT)
Age: 63
End: 2023-12-06

## 2023-12-06 NOTE — TELEPHONE ENCOUNTER
----- Message from [facility] sent at 12/6/2023  1:02 PM EST -----  Subject: Message to Provider    QUESTIONS  Information for Provider? Patient called in to request for hisd provider   to fax his lab orders over to [facility] so he can have them done   prior to hi scheduled appointment on 12/21. Please contact patient to   further assist.   ---------------------------------------------------------------------------  --------------  Michael VILLELA  [phone]; OK to leave message on voicemail  ---------------------------------------------------------------------------  --------------  SCRIPT ANSWERS  Relationship to Patient?  Self

## 2023-12-06 NOTE — TELEPHONE ENCOUNTER
Please let patient know that lab orders are in the computer and can be seen by [facility] facility.

## 2023-12-21 PROBLEM — Z86.73 HISTORY OF COMPLETED STROKE: Status: ACTIVE | Noted: 2021-06-01

## 2023-12-21 PROBLEM — R73.02 IGT (IMPAIRED GLUCOSE TOLERANCE): Status: ACTIVE | Noted: 2023-12-21

## 2024-01-02 ENCOUNTER — HOSPITAL ENCOUNTER (OUTPATIENT)
Facility: HOSPITAL | Age: 64
Discharge: HOME | End: 2024-01-05

## 2024-01-02 LAB — SENTARA SPECIMEN COLLECTION: NORMAL

## 2024-01-03 LAB
A/G RATIO: 1.8 RATIO (ref 1.1–2.6)
ALBUMIN SERPL-MCNC: 4.5 G/DL (ref 3.5–5)
ALP BLD-CCNC: 77 U/L (ref 40–125)
ALT SERPL-CCNC: 58 U/L (ref 5–40)
ANION GAP SERPL CALCULATED.3IONS-SCNC: 9 MMOL/L (ref 3–15)
AST SERPL-CCNC: 56 U/L (ref 10–37)
AVERAGE GLUCOSE: 119 MG/DL (ref 91–123)
BASOPHILS # BLD: 2 % (ref 0–2)
BASOPHILS ABSOLUTE: 0.1 K/UL (ref 0–0.2)
BILIRUB SERPL-MCNC: 0.6 MG/DL (ref 0.2–1.2)
BUN BLDV-MCNC: 13 MG/DL (ref 6–22)
CALCIUM SERPL-MCNC: 9.7 MG/DL (ref 8.4–10.5)
CHLORIDE BLD-SCNC: 106 MMOL/L (ref 98–110)
CHOLESTEROL/HDL RATIO: 3.9 (ref 0–5)
CHOLESTEROL: 139 MG/DL (ref 110–200)
CO2: 27 MMOL/L (ref 20–32)
CREAT SERPL-MCNC: 0.7 MG/DL (ref 0.8–1.6)
EOSINOPHIL # BLD: 5 % (ref 0–6)
EOSINOPHILS ABSOLUTE: 0.3 K/UL (ref 0–0.5)
GLOBULIN: 2.5 G/DL (ref 2–4)
GLOMERULAR FILTRATION RATE: >60 ML/MIN/1.73 SQ.M.
GLUCOSE: 93 MG/DL (ref 70–99)
HBA1C MFR BLD: 5.8 % (ref 4.8–5.6)
HCT VFR BLD CALC: 41.4 % (ref 39.3–51.6)
HDLC SERPL-MCNC: 36 MG/DL
HEMOGLOBIN: 13.1 G/DL (ref 13.1–17.2)
LDL CHOLESTEROL CALCULATED: 87 MG/DL (ref 50–99)
LDL/HDL RATIO: 2.4
LYMPHOCYTES # BLD: 29 % (ref 20–45)
LYMPHOCYTES ABSOLUTE: 1.6 K/UL (ref 1–4.8)
MCH RBC QN AUTO: 31 PG (ref 26–34)
MCHC RBC AUTO-ENTMCNC: 32 G/DL (ref 31–36)
MCV RBC AUTO: 98 FL (ref 80–95)
MONOCYTES ABSOLUTE: 0.6 K/UL (ref 0.1–1)
MONOCYTES: 11 % (ref 3–12)
NEUTROPHILS ABSOLUTE: 2.9 K/UL (ref 1.8–7.7)
NEUTROPHILS: 54 % (ref 40–75)
NON-HDL CHOLESTEROL: 103 MG/DL
PDW BLD-RTO: 12.6 % (ref 10–15.5)
PLATELET # BLD: 264 K/UL (ref 140–440)
PMV BLD AUTO: 11.2 FL (ref 9–13)
POTASSIUM SERPL-SCNC: 4.6 MMOL/L (ref 3.5–5.5)
PROSTATE SPECIFIC ANTIGEN: 0.26 NG/ML
RBC: 4.21 M/UL (ref 3.8–5.8)
SODIUM BLD-SCNC: 142 MMOL/L (ref 133–145)
TOTAL PROTEIN: 7 G/DL (ref 6.2–8.1)
TRIGL SERPL-MCNC: 80 MG/DL (ref 40–149)
VLDLC SERPL CALC-MCNC: 16 MG/DL (ref 8–30)
WBC: 5.4 K/UL (ref 4–11)

## 2024-01-05 ENCOUNTER — OFFICE VISIT (OUTPATIENT)
Age: 64
End: 2024-01-05
Payer: COMMERCIAL

## 2024-01-05 ENCOUNTER — TELEPHONE (OUTPATIENT)
Age: 64
End: 2024-01-05

## 2024-01-05 VITALS
BODY MASS INDEX: 26.64 KG/M2 | WEIGHT: 201 LBS | RESPIRATION RATE: 16 BRPM | OXYGEN SATURATION: 98 % | HEART RATE: 58 BPM | TEMPERATURE: 97.4 F | DIASTOLIC BLOOD PRESSURE: 78 MMHG | SYSTOLIC BLOOD PRESSURE: 135 MMHG | HEIGHT: 73 IN

## 2024-01-05 DIAGNOSIS — Z86.73 HISTORY OF COMPLETED STROKE: ICD-10-CM

## 2024-01-05 DIAGNOSIS — G47.30 SLEEP APNEA, UNSPECIFIED TYPE: ICD-10-CM

## 2024-01-05 DIAGNOSIS — I10 PRIMARY HYPERTENSION: ICD-10-CM

## 2024-01-05 DIAGNOSIS — R74.01 TRANSAMINASEMIA: ICD-10-CM

## 2024-01-05 DIAGNOSIS — E66.3 OVERWEIGHT WITH BODY MASS INDEX (BMI) OF 25 TO 25.9 IN ADULT: ICD-10-CM

## 2024-01-05 DIAGNOSIS — R73.02 IGT (IMPAIRED GLUCOSE TOLERANCE): ICD-10-CM

## 2024-01-05 DIAGNOSIS — I77.810 AORTIC ROOT DILATION (HCC): Primary | ICD-10-CM

## 2024-01-05 DIAGNOSIS — E78.5 DYSLIPIDEMIA: ICD-10-CM

## 2024-01-05 DIAGNOSIS — Z00.00 PHYSICAL EXAM: Primary | ICD-10-CM

## 2024-01-05 PROCEDURE — 99396 PREV VISIT EST AGE 40-64: CPT | Performed by: INTERNAL MEDICINE

## 2024-01-05 PROCEDURE — 3078F DIAST BP <80 MM HG: CPT | Performed by: INTERNAL MEDICINE

## 2024-01-05 PROCEDURE — 3075F SYST BP GE 130 - 139MM HG: CPT | Performed by: INTERNAL MEDICINE

## 2024-01-05 RX ORDER — ASPIRIN 325 MG
325 TABLET ORAL DAILY
Qty: 90 TABLET | Refills: 3
Start: 2024-01-05

## 2024-01-05 RX ORDER — ASPIRIN 325 MG
325 TABLET ORAL DAILY
Qty: 90 TABLET | Refills: 3 | Status: SHIPPED | OUTPATIENT
Start: 2024-01-05

## 2024-01-05 ASSESSMENT — PATIENT HEALTH QUESTIONNAIRE - PHQ9
SUM OF ALL RESPONSES TO PHQ9 QUESTIONS 1 & 2: 0
1. LITTLE INTEREST OR PLEASURE IN DOING THINGS: 0
SUM OF ALL RESPONSES TO PHQ QUESTIONS 1-9: 0
2. FEELING DOWN, DEPRESSED OR HOPELESS: 0
SUM OF ALL RESPONSES TO PHQ QUESTIONS 1-9: 0

## 2024-01-05 NOTE — PROGRESS NOTES
Aamir ELMA Aponte presents today for   Chief Complaint   Patient presents with    Annual Exam       1. \"Have you been to the ER, urgent care clinic since your last visit?  Hospitalized since your last visit?\" No    2. \"Have you seen or consulted any other health care providers outside of the [facility] since your last visit?\" No     3. For patients aged 45-75: Has the patient had a colonoscopy / FIT/ Cologuard? Yes - no Care Gap present      If the patient is female:    4. For patients aged 40-74: Has the patient had a mammogram within the past 2 years? NA - based on age or sex      5. For patients aged 21-65: Has the patient had a pap smear? NA - based on age or sex

## 2024-01-05 NOTE — TELEPHONE ENCOUNTER
Pls call    He's supposed to be on lipitor (chol) and amlodipine (bp)  However, he's also supposed to be taking asa 325 daily FOREVER with the h/o stroke - script sent    Also, he was supposed to see cardiology 2022/2023 for follow up aortic root dilation - Dr Montemayor - but was not seen  I will sched CT thorax to evaluate for possible progression to aneurysm       Diagnosis Orders   1. Aortic root dilation (HCC)  CT CHEST W CONTRAST      2. History of completed stroke  aspirin (JEFE ASPIRIN) 325 MG tablet

## 2024-01-05 NOTE — PROGRESS NOTES
63 y.o. male who presents for evaluation. RPE.      He has not followed up with Dr Ohara but no new neurologic issues.  He stopped taking asa for reasons unclear despite previous discussions about the stroke     Denied Gi or  complaints.     The sleep apnea is controlled and energy level is good    He has not had follow up for card evaluation and due for imaging of the dilated ao root    Denies polyuria, polydipsia, nocturia, vision change.  Not checking sugars at this time. Weight stable.  No exercise mainly due to motivational issues, reports they've cut back on the red meat a lot. Bp has been controlled since we added amlo mid 2023 1/9/2023 7/20/2023 9/1/2023 12/21/2023 1/5/2024   Vitals        Weight - Scale 194 lb  197 lb  191 lb  200 lb  201 lb      He did have presumed shingles episode in early fall, has not taken shingrix as yet    Past Medical History:   Diagnosis Date    Allergic rhinitis     Benign fundic gland polyps of stomach 09/15/2020    Dr Estrella    Carotid stenosis 06/2021    BICA<50%    COVID-19 vaccine series declined 01/2023    COVID-19 virus infection 01/2021    urgent care    Depression 01/2023    declined therapy or meds    Dyslipidemia     calculated 10 year risk score was 6.7%(2/14); 7.7% (3/15); 7.9% (2/15); 6.3% (3/17); 9.3% (4/18); declined statin repeatedly; declined ca score    H/O cardiovascular stress test     thallium neg (2007)    H/O echocardiogram     nl lv, ef 55% (11/08); ef 55%, mild cLVH, gr 1 dd, mod ao root dilation 4.5cm, mild LAE, neg bubble study (6/21)    Hypertension     IGT (impaired glucose tolerance) 12/21/2023    IGT (impaired glucose tolerance) 2021    LANRE (obstructive sleep apnea)     Dr. Barksdale    Overweight(278.02)     Stroke (cerebrum) ([facility]) 06/01/2021    mri showed acute R basal ganglia infarct; left sided weakness/incoord; Dr Mahoney; saw Dr Ohara; holter neg    Zoster     right T10 (1990s); groin (2023)     Past Surgical History:   Procedure

## 2024-01-15 ENCOUNTER — HOSPITAL ENCOUNTER (OUTPATIENT)
Facility: HOSPITAL | Age: 64
Discharge: HOME | End: 2024-01-18
Attending: INTERNAL MEDICINE
Payer: COMMERCIAL

## 2024-01-15 ENCOUNTER — HOSPITAL ENCOUNTER (OUTPATIENT)
Facility: HOSPITAL | Age: 64
Discharge: HOME | End: 2024-01-18

## 2024-01-15 DIAGNOSIS — I77.810 AORTIC ROOT DILATION (HCC): ICD-10-CM

## 2024-01-15 DIAGNOSIS — R74.01 TRANSAMINASEMIA: ICD-10-CM

## 2024-01-15 DIAGNOSIS — E78.5 DYSLIPIDEMIA: ICD-10-CM

## 2024-01-15 LAB — SENTARA SPECIMEN COLLECTION: NORMAL

## 2024-01-15 PROCEDURE — 71260 CT THORAX DX C+: CPT

## 2024-01-15 PROCEDURE — 76700 US EXAM ABDOM COMPLETE: CPT

## 2024-01-15 PROCEDURE — 6360000004 HC RX CONTRAST MEDICATION: Performed by: INTERNAL MEDICINE

## 2024-01-15 RX ADMIN — IOPAMIDOL 80 ML: 612 INJECTION, SOLUTION INTRAVENOUS at 16:24

## 2024-01-17 LAB
A/G RATIO: 1.6 RATIO (ref 1.1–2.6)
ALBUMIN SERPL-MCNC: 4.8 G/DL (ref 3.5–5)
ALP BLD-CCNC: 91 U/L (ref 40–125)
ALT SERPL-CCNC: 59 U/L (ref 5–40)
ANA HOMOGENEOUS: ABNORMAL TITER
ANA NUCLEOLAR: NEGATIVE TITER
ANA PERIPHERAL: NEGATIVE TITER
ANA SCREEN: POSITIVE
ANA SPECKLED: NEGATIVE TITER
ANION GAP SERPL CALCULATED.3IONS-SCNC: 11 MMOL/L (ref 3–15)
AST SERPL-CCNC: 53 U/L (ref 10–37)
AVERAGE GLUCOSE: 122 MG/DL (ref 91–123)
BILIRUB SERPL-MCNC: 0.6 MG/DL (ref 0.2–1.2)
BUN BLDV-MCNC: 18 MG/DL (ref 6–22)
CALCIUM SERPL-MCNC: 9.9 MG/DL (ref 8.4–10.5)
CENTROMERE AB: NEGATIVE TITER
CERULOPLASMIN: 29 MG/DL (ref 18–36)
CHLORIDE BLD-SCNC: 103 MMOL/L (ref 98–110)
CHOLESTEROL/HDL RATIO: 3.7 (ref 0–5)
CHOLESTEROL: 149 MG/DL (ref 110–200)
CO2: 26 MMOL/L (ref 20–32)
CREAT SERPL-MCNC: 0.7 MG/DL (ref 0.8–1.6)
FERRITIN: 268 NG/ML (ref 22–322)
GLIADIN ANTIBODIES IGA: 1.2 U/ML
GLIADIN ANTIBODIES IGG: <0.5 U/ML
GLOBULIN: 3 G/DL (ref 2–4)
GLOMERULAR FILTRATION RATE: >60 ML/MIN/1.73 SQ.M.
GLUCOSE: 79 MG/DL (ref 70–99)
HBA1C MFR BLD: 5.9 % (ref 4.8–5.6)
HDLC SERPL-MCNC: 40 MG/DL
HEPATITIS B SURF AG,XHBAGS: NORMAL
HEPATITIS C ANTIBODY: NORMAL
IMMUNOGLOBULIN A, QT.: 353 MG/DL (ref 70–400)
IRON % SATURATION: 41 % (ref 20–50)
IRON: 137 MCG/DL (ref 45–160)
LDL CHOLESTEROL CALCULATED: 96 MG/DL (ref 50–99)
LDL/HDL RATIO: 2.4
MITOCHONDRIAL M2 AB, IGG: <=20 U
NON-HDL CHOLESTEROL: 109 MG/DL
POTASSIUM SERPL-SCNC: 3.9 MMOL/L (ref 3.5–5.5)
SODIUM BLD-SCNC: 140 MMOL/L (ref 133–145)
TISSUE TRANSGLUTAMINASE ANTIBODY IGG: <0.8 U/ML
TISSUE TRANSGLUTAMINASE IGA: <0.5 U/ML
TOTAL IRON BINDING CAPACITY: 337 MCG/DL (ref 228–428)
TOTAL PROTEIN: 7.8 G/DL (ref 6.2–8.1)
TRIGL SERPL-MCNC: 62 MG/DL (ref 40–149)
UIBC: 200 MCG/DL (ref 110–370)
VLDLC SERPL CALC-MCNC: 12 MG/DL (ref 8–30)

## 2024-02-04 ENCOUNTER — TELEPHONE (OUTPATIENT)
Age: 64
End: 2024-02-04

## 2024-02-04 PROBLEM — K76.0 FATTY LIVER: Status: ACTIVE | Noted: 2024-02-04

## 2024-02-07 ENCOUNTER — TELEPHONE (OUTPATIENT)
Age: 64
End: 2024-02-07

## 2024-02-07 NOTE — TELEPHONE ENCOUNTER
Patients wife called back due to missing Nia's calls and states instead of calling  please call her for he is on a bus and unable to get to the phone.     Alicia Aponte: [phone]

## 2024-02-28 RX ORDER — ATORVASTATIN CALCIUM 80 MG/1
TABLET, FILM COATED ORAL
Qty: 90 TABLET | Refills: 2 | Status: SHIPPED | OUTPATIENT
Start: 2024-02-28

## 2024-06-21 DIAGNOSIS — Z00.00 PHYSICAL EXAM: ICD-10-CM

## 2024-07-05 ENCOUNTER — HOSPITAL ENCOUNTER (OUTPATIENT)
Facility: HOSPITAL | Age: 64
Discharge: HOME | End: 2024-07-08

## 2024-07-05 LAB
A/G RATIO: 1.7 RATIO (ref 1.1–2.6)
ALBUMIN: 4.3 G/DL (ref 3.5–5)
ALP BLD-CCNC: 90 U/L (ref 40–125)
ALT SERPL-CCNC: 46 U/L (ref 5–40)
ANION GAP SERPL CALCULATED.3IONS-SCNC: 10 MMOL/L (ref 3–15)
AST SERPL-CCNC: 47 U/L (ref 10–37)
BASOPHILS ABSOLUTE: 0.1 K/UL (ref 0–0.2)
BASOPHILS RELATIVE PERCENT: 2 % (ref 0–2)
BILIRUB SERPL-MCNC: 0.5 MG/DL (ref 0.2–1.2)
BUN BLDV-MCNC: 14 MG/DL (ref 6–22)
CALCIUM SERPL-MCNC: 9.2 MG/DL (ref 8.4–10.5)
CHLORIDE BLD-SCNC: 106 MMOL/L (ref 98–110)
CHOLESTEROL, TOTAL: 132 MG/DL (ref 110–200)
CHOLESTEROL/HDL RATIO: 3.5 (ref 0–5)
CO2: 25 MMOL/L (ref 20–32)
CREAT SERPL-MCNC: 0.8 MG/DL (ref 0.8–1.6)
EOSINOPHIL # BLD: 6 % (ref 0–6)
EOSINOPHILS ABSOLUTE: 0.3 K/UL (ref 0–0.5)
GFR, ESTIMATED: >60 ML/MIN/1.73 SQ.M.
GLOBULIN: 2.6 G/DL (ref 2–4)
GLUCOSE: 93 MG/DL (ref 70–99)
HCT VFR BLD CALC: 41.1 % (ref 39.3–51.6)
HDLC SERPL-MCNC: 38 MG/DL
HEMOGLOBIN: 13.4 G/DL (ref 13.1–17.2)
LDL CHOLESTEROL: 80 MG/DL (ref 50–99)
LDL/HDL RATIO: 2.1
LYMPHOCYTES # BLD: 28 % (ref 20–45)
LYMPHOCYTES ABSOLUTE: 1.4 K/UL (ref 1–4.8)
MCH RBC QN AUTO: 31 PG (ref 26–34)
MCHC RBC AUTO-ENTMCNC: 33 G/DL (ref 31–36)
MCV RBC AUTO: 96 FL (ref 80–95)
MONOCYTES ABSOLUTE: 0.5 K/UL (ref 0.1–1)
MONOCYTES: 9 % (ref 3–12)
NEUTROPHILS ABSOLUTE: 2.8 K/UL (ref 1.8–7.7)
NEUTROPHILS: 55 % (ref 40–75)
NON-HDL CHOLESTEROL: 94 MG/DL
PDW BLD-RTO: 12.8 % (ref 10–15.5)
PLATELET # BLD: 241 K/UL (ref 140–440)
PMV BLD AUTO: 11.4 FL (ref 9–13)
POTASSIUM SERPL-SCNC: 4.6 MMOL/L (ref 3.5–5.5)
PROSTATE SPECIFIC ANTIGEN: 0.24 NG/ML
RBC # BLD: 4.27 M/UL (ref 3.8–5.8)
SENTARA SPECIMEN COLLECTION: NORMAL
SODIUM BLD-SCNC: 141 MMOL/L (ref 133–145)
TOTAL PROTEIN: 6.9 G/DL (ref 6.2–8.1)
TRIGL SERPL-MCNC: 69 MG/DL (ref 40–149)
VLDLC SERPL CALC-MCNC: 14 MG/DL (ref 8–30)
WBC # BLD: 5.1 K/UL (ref 4–11)

## 2024-07-05 PROCEDURE — 99001 SPECIMEN HANDLING PT-LAB: CPT

## 2024-07-05 NOTE — PROGRESS NOTES
63 y.o. male who presents for evaluation evaluation    He has not followed up with Dr Ohara but no new neurologic issues.    Denied Gi or  complaints. His lfts were noted to be elevated the last couple visit, serologies were neg as below but US confirmed fatty liver.  Minimal etoh, no fhx liver disease    The sleep apnea is controlled and energy level is good    Denies polyuria, polydipsia, nocturia, vision change.  Not checking sugars at this time. Weight is inching up as below and diet is generally good     1/9/2023 7/20/2023 9/1/2023 12/21/2023 1/5/2024 7/8/2024   Vitals         Weight - Scale 194 lb  197 lb  191 lb  200 lb  201 lb  200 lb      Past Medical History:   Diagnosis Date    Allergic rhinitis     Benign fundic gland polyps of stomach 09/15/2020    Dr Estrella    Carotid stenosis 06/2021    BICA<50%    COVID-19 vaccine series declined 01/2023    COVID-19 virus infection 01/2021    urgent care    Depression 01/2023    declined therapy or meds    Dyslipidemia     calculated 10 year risk score was 6.7%(2/14); 7.7% (3/15); 7.9% (2/15); 6.3% (3/17); 9.3% (4/18); declined statin repeatedly; declined ca score    Fatty liver 01/2024    on US; serologies ok    H/O cardiovascular stress test     thallium neg (2007)    H/O echocardiogram     nl lv, ef 55% (11/08); ef 55%, mild cLVH, gr 1 dd, mod ao root dilation 4.5cm, mild LAE, neg bubble study (6/21); f/u CT thorax showed ao root 3.4cm (1/24)    Hypertension     IGT (impaired glucose tolerance) 2021    LANRE (obstructive sleep apnea)     Dr. Barksdale    Overweight(278.02)     Stroke (cerebrum) (HCC) 06/01/2021    mri showed acute R basal ganglia infarct; left sided weakness/incoord; Dr Mahoney; saw Dr Ohara; holter neg    Zoster     right T10 (1990s); groin (2023)     Past Surgical History:   Procedure Laterality Date    COLONOSCOPY      Dr Rabago (7/16/10) neg; Dr Estrella (9/15/20) neg     Social History     Socioeconomic History    Marital status:

## 2024-07-08 ENCOUNTER — OFFICE VISIT (OUTPATIENT)
Facility: CLINIC | Age: 64
End: 2024-07-08
Payer: COMMERCIAL

## 2024-07-08 VITALS
SYSTOLIC BLOOD PRESSURE: 123 MMHG | RESPIRATION RATE: 16 BRPM | DIASTOLIC BLOOD PRESSURE: 85 MMHG | WEIGHT: 200 LBS | OXYGEN SATURATION: 96 % | TEMPERATURE: 97.7 F | HEIGHT: 73 IN | HEART RATE: 64 BPM | BODY MASS INDEX: 26.51 KG/M2

## 2024-07-08 DIAGNOSIS — K76.0 FATTY LIVER: Primary | ICD-10-CM

## 2024-07-08 DIAGNOSIS — I65.23 BILATERAL CAROTID ARTERY STENOSIS: ICD-10-CM

## 2024-07-08 DIAGNOSIS — Z86.73 HISTORY OF COMPLETED STROKE: ICD-10-CM

## 2024-07-08 DIAGNOSIS — R73.02 IGT (IMPAIRED GLUCOSE TOLERANCE): ICD-10-CM

## 2024-07-08 DIAGNOSIS — G47.30 SLEEP APNEA, UNSPECIFIED TYPE: ICD-10-CM

## 2024-07-08 DIAGNOSIS — E78.5 DYSLIPIDEMIA: ICD-10-CM

## 2024-07-08 DIAGNOSIS — I10 PRIMARY HYPERTENSION: ICD-10-CM

## 2024-07-08 PROCEDURE — 3079F DIAST BP 80-89 MM HG: CPT | Performed by: INTERNAL MEDICINE

## 2024-07-08 PROCEDURE — 3074F SYST BP LT 130 MM HG: CPT | Performed by: INTERNAL MEDICINE

## 2024-07-08 PROCEDURE — 99214 OFFICE O/P EST MOD 30 MIN: CPT | Performed by: INTERNAL MEDICINE

## 2024-07-08 RX ORDER — FLUTICASONE PROPIONATE 50 MCG
1 SPRAY, SUSPENSION (ML) NASAL DAILY PRN
COMMUNITY

## 2024-07-08 NOTE — PROGRESS NOTES
Aamir Aponte presents today for   Chief Complaint   Patient presents with    6 Month Follow-Up    Hypertension       \"Have you been to the ER, urgent care clinic since your last visit?  Hospitalized since your last visit?\"    NO    “Have you seen or consulted any other health care providers outside of [facility] since your last visit?”    NO

## 2024-07-10 ENCOUNTER — TELEPHONE (OUTPATIENT)
Facility: CLINIC | Age: 64
End: 2024-07-10

## 2024-07-10 NOTE — TELEPHONE ENCOUNTER
Pt called in states he thinks he is having a shingles outbreak behind his knees and wants to know if something can be called in or if he needs to come in for an appt. Pt was last seen 7/8/24.  Please advise.     Pharmacy   [id number] - [place], [place] - [address] - P [phone] - F [phone] [[id number]]

## 2024-10-02 RX ORDER — ATORVASTATIN CALCIUM 80 MG/1
TABLET, FILM COATED ORAL
Qty: 90 TABLET | Refills: 3 | Status: SHIPPED | OUTPATIENT
Start: 2024-10-02

## 2024-11-04 DIAGNOSIS — I10 PRIMARY HYPERTENSION: ICD-10-CM

## 2024-11-04 NOTE — TELEPHONE ENCOUNTER
Refill request via fax     Medication: amLODIPine (NORVASC) 2.5 MG tablet   Quantity: 90  Pharmacy: [facility] #[id number] [address]   Last Fill: 10/26/2023    PCP: Jeremias Lyn MD    LAST OFFICE VISIT: 7/8/2024      Future Appointments   Date Time Provider Department Center   1/9/2025  1:20 PM Jeremias Lyn MD [facility] DEP

## 2024-11-05 RX ORDER — AMLODIPINE BESYLATE 2.5 MG/1
2.5 TABLET ORAL DAILY
Qty: 90 TABLET | Refills: 3 | Status: SHIPPED | OUTPATIENT
Start: 2024-11-05

## 2024-11-11 ENCOUNTER — HOSPITAL ENCOUNTER (OUTPATIENT)
Facility: HOSPITAL | Age: 64
Setting detail: SPECIMEN
Discharge: HOME | End: 2024-11-14

## 2024-11-11 LAB — SENTARA SPECIMEN COLLECTION: NORMAL

## 2024-11-11 PROCEDURE — 99001 SPECIMEN HANDLING PT-LAB: CPT

## 2024-11-19 ENCOUNTER — TELEPHONE (OUTPATIENT)
Facility: CLINIC | Age: 64
End: 2024-11-19

## 2024-11-19 DIAGNOSIS — R73.02 IGT (IMPAIRED GLUCOSE TOLERANCE): Primary | ICD-10-CM

## 2024-11-20 ENCOUNTER — HOSPITAL ENCOUNTER (OUTPATIENT)
Facility: HOSPITAL | Age: 64
Setting detail: SPECIMEN
Discharge: HOME | End: 2024-11-23

## 2024-11-20 LAB — SENTARA SPECIMEN COLLECTION: NORMAL

## 2024-11-20 PROCEDURE — 99001 SPECIMEN HANDLING PT-LAB: CPT

## 2024-12-19 DIAGNOSIS — R73.02 IGT (IMPAIRED GLUCOSE TOLERANCE): ICD-10-CM

## 2025-01-01 NOTE — PROGRESS NOTES
64 y.o. male who presents for evaluation evaluation    Here to get his  forms filled out.  No cp, sob, pnd, edema.      He has not followed up with Dr Ohara but no new neurologic issues.    Denied Gi or  complaints. He saw [facility] for the fatty liver, no specific recs outside of trending the numbers.    The sleep apnea is controlled and energy level is good    Denies polyuria, polydipsia, nocturia, vision change.  Not checking sugars at this time. Weight is inching up as below and diet is generally good     7/20/2023 9/1/2023 12/21/2023 1/5/2024 7/8/2024 1/9/2025   Vitals         Weight - Scale 197 lb  191 lb  200 lb  201 lb  200 lb  203 lb      He has an infected area in the right chest     Lastly, he reports hot flashes at night intermittently the last few weeks. No f, wt loss, resp/gi/gu complaints    Past Medical History:   Diagnosis Date    Allergic rhinitis     Benign fundic gland polyps of stomach 09/15/2020    Dr Estrella    Carotid stenosis 06/2021    BICA<50%    COVID-19 vaccine series declined 01/2023    COVID-19 virus infection 01/2021    urgent care    Depression 01/2023    declined therapy or meds    Dyslipidemia     calculated 10 year risk score was 6.7%(2/14); 7.7% (3/15); 7.9% (2/15); 6.3% (3/17); 9.3% (4/18); declined statin repeatedly; declined ca score    H/O cardiovascular stress test     thallium neg (2007)    H/O echocardiogram     nl lv, ef 55% (11/08); ef 55%, mild cLVH, gr 1 dd, mod ao root dilation 4.5cm, mild LAE, neg bubble study (6/21); f/u CT thorax showed ao root 3.4cm (1/24)    Hypertension     IGT (impaired glucose tolerance) 2021    Metabolic dysfunction-associated steatotic liver disease (MASLD) 01/2024    fatty liver on US; serologies neg (1/24); Fib-4 1.78 (7/24); PA Leo (11/24)    LANRE (obstructive sleep apnea)     Dr. Barksdale    Overweight(278.02)     Stroke (cerebrum) (HCC) 06/01/2021    mri showed acute R basal ganglia infarct; left sided weakness/incoord; Dr Mahoney;

## 2025-01-02 ENCOUNTER — HOSPITAL ENCOUNTER (OUTPATIENT)
Facility: HOSPITAL | Age: 65
Setting detail: SPECIMEN
Discharge: HOME | End: 2025-01-05

## 2025-01-02 LAB — LABCORP SPECIMEN COLLECTION: NORMAL

## 2025-01-02 PROCEDURE — 99001 SPECIMEN HANDLING PT-LAB: CPT

## 2025-01-03 LAB
ALBUMIN SERPL-MCNC: 4.2 G/DL (ref 3.9–4.9)
ALP SERPL-CCNC: 96 IU/L (ref 44–121)
ALT SERPL-CCNC: 47 IU/L (ref 0–44)
AST SERPL-CCNC: 49 IU/L (ref 0–40)
BILIRUB SERPL-MCNC: 0.5 MG/DL (ref 0–1.2)
BUN SERPL-MCNC: 15 MG/DL (ref 8–27)
BUN/CREAT SERPL: 19 (ref 10–24)
CALCIUM SERPL-MCNC: 9.7 MG/DL (ref 8.6–10.2)
CHLORIDE SERPL-SCNC: 106 MMOL/L (ref 96–106)
CO2 SERPL-SCNC: 24 MMOL/L (ref 20–29)
CREAT SERPL-MCNC: 0.81 MG/DL (ref 0.76–1.27)
EGFRCR SERPLBLD CKD-EPI 2021: 98 ML/MIN/1.73
GLOBULIN SER CALC-MCNC: 2.7 G/DL (ref 1.5–4.5)
GLUCOSE SERPL-MCNC: 95 MG/DL (ref 70–99)
POTASSIUM SERPL-SCNC: 4.6 MMOL/L (ref 3.5–5.2)
PROT SERPL-MCNC: 6.9 G/DL (ref 6–8.5)
SODIUM SERPL-SCNC: 144 MMOL/L (ref 134–144)
SPECIMEN STATUS REPORT: NORMAL

## 2025-01-09 ENCOUNTER — OFFICE VISIT (OUTPATIENT)
Facility: CLINIC | Age: 65
End: 2025-01-09

## 2025-01-09 VITALS
SYSTOLIC BLOOD PRESSURE: 133 MMHG | TEMPERATURE: 98.4 F | HEART RATE: 75 BPM | BODY MASS INDEX: 26.9 KG/M2 | HEIGHT: 73 IN | RESPIRATION RATE: 16 BRPM | OXYGEN SATURATION: 98 % | WEIGHT: 203 LBS | DIASTOLIC BLOOD PRESSURE: 89 MMHG

## 2025-01-09 DIAGNOSIS — I10 PRIMARY HYPERTENSION: ICD-10-CM

## 2025-01-09 DIAGNOSIS — Z12.5 SCREENING FOR MALIGNANT NEOPLASM OF PROSTATE: ICD-10-CM

## 2025-01-09 DIAGNOSIS — Z86.73 HISTORY OF COMPLETED STROKE: ICD-10-CM

## 2025-01-09 DIAGNOSIS — R23.2 HOT FLASHES: ICD-10-CM

## 2025-01-09 DIAGNOSIS — R73.02 IGT (IMPAIRED GLUCOSE TOLERANCE): ICD-10-CM

## 2025-01-09 DIAGNOSIS — E78.5 DYSLIPIDEMIA: ICD-10-CM

## 2025-01-09 DIAGNOSIS — L03.313 CELLULITIS OF CHEST WALL: Primary | ICD-10-CM

## 2025-01-09 DIAGNOSIS — K76.0 FATTY LIVER: ICD-10-CM

## 2025-01-09 RX ORDER — SULFAMETHOXAZOLE AND TRIMETHOPRIM 800; 160 MG/1; MG/1
1 TABLET ORAL 2 TIMES DAILY
Qty: 14 TABLET | Refills: 0 | Status: SHIPPED | OUTPATIENT
Start: 2025-01-09 | End: 2025-01-16

## 2025-01-09 SDOH — ECONOMIC STABILITY: FOOD INSECURITY: WITHIN THE PAST 12 MONTHS, THE FOOD YOU BOUGHT JUST DIDN'T LAST AND YOU DIDN'T HAVE MONEY TO GET MORE.: NEVER TRUE

## 2025-01-09 SDOH — ECONOMIC STABILITY: FOOD INSECURITY: WITHIN THE PAST 12 MONTHS, YOU WORRIED THAT YOUR FOOD WOULD RUN OUT BEFORE YOU GOT MONEY TO BUY MORE.: NEVER TRUE

## 2025-01-09 ASSESSMENT — PATIENT HEALTH QUESTIONNAIRE - PHQ9
SUM OF ALL RESPONSES TO PHQ9 QUESTIONS 1 & 2: 0
SUM OF ALL RESPONSES TO PHQ QUESTIONS 1-9: 0
1. LITTLE INTEREST OR PLEASURE IN DOING THINGS: NOT AT ALL
2. FEELING DOWN, DEPRESSED OR HOPELESS: NOT AT ALL

## 2025-01-09 NOTE — PROGRESS NOTES
Aamir Aponte presents today for   Chief Complaint   Patient presents with    Annual Exam       \"Have you been to the ER, urgent care clinic since your last visit?  Hospitalized since your last visit?\"    NO    “Have you seen or consulted any other health care providers outside of [facility] since your last visit?”    NO

## 2025-01-20 ENCOUNTER — PATIENT MESSAGE (OUTPATIENT)
Facility: CLINIC | Age: 65
End: 2025-01-20

## 2025-01-20 DIAGNOSIS — D75.89 MACROCYTOSIS: Primary | ICD-10-CM

## 2025-01-21 DIAGNOSIS — D75.89 MACROCYTOSIS: ICD-10-CM

## 2025-01-21 NOTE — TELEPHONE ENCOUNTER
Contacted patient spouse and notified of the lab orders. Sent scheduling ticket for the option to have the labs drawn here.

## 2025-01-21 NOTE — TELEPHONE ENCOUNTER
B12/fol/mma ordered  Pls print out and mail to pt to get done     Diagnosis Orders   1. Macrocytosis  Vitamin B12 & Folate    Methylmalonic Acid, Serum

## 2025-01-25 ENCOUNTER — HOSPITAL ENCOUNTER (OUTPATIENT)
Facility: HOSPITAL | Age: 65
Discharge: HOME | End: 2025-01-28

## 2025-01-25 LAB
SENTARA SPECIMEN COLLECTION: NORMAL
T4 FREE: 0.9 NG/DL (ref 0.9–1.8)
TSH SERPL DL<=0.05 MIU/L-ACNC: 3.81 MCU/ML (ref 0.27–4.2)

## 2025-01-25 PROCEDURE — 99001 SPECIMEN HANDLING PT-LAB: CPT

## 2025-03-09 DIAGNOSIS — Z86.73 HISTORY OF COMPLETED STROKE: ICD-10-CM

## 2025-03-11 RX ORDER — ASPIRIN 325 MG
325 TABLET, DELAYED RELEASE (ENTERIC COATED) ORAL DAILY
Qty: 90 TABLET | Refills: 3 | Status: SHIPPED | OUTPATIENT
Start: 2025-03-11

## 2025-04-01 ENCOUNTER — TELEPHONE (OUTPATIENT)
Facility: CLINIC | Age: 65
End: 2025-04-01

## 2025-04-01 NOTE — TELEPHONE ENCOUNTER
Pt wife called states on 03/29 patient went for a walk after returning she stated he was   Pounding on his chest , he told her it was indigestion,he does not have SOB she said because of his history  she is asking for pt to get a stress test . Please advise   [phone]

## 2025-04-02 ENCOUNTER — OFFICE VISIT (OUTPATIENT)
Facility: CLINIC | Age: 65
End: 2025-04-02
Payer: MEDICARE

## 2025-04-02 VITALS
WEIGHT: 208 LBS | SYSTOLIC BLOOD PRESSURE: 133 MMHG | TEMPERATURE: 98.4 F | BODY MASS INDEX: 27.57 KG/M2 | HEART RATE: 64 BPM | DIASTOLIC BLOOD PRESSURE: 84 MMHG | OXYGEN SATURATION: 99 % | RESPIRATION RATE: 16 BRPM | HEIGHT: 73 IN

## 2025-04-02 DIAGNOSIS — R07.9 CHEST PAIN, UNSPECIFIED TYPE: Primary | ICD-10-CM

## 2025-04-02 DIAGNOSIS — R10.13 DYSPEPSIA: ICD-10-CM

## 2025-04-02 PROCEDURE — 3017F COLORECTAL CA SCREEN DOC REV: CPT | Performed by: INTERNAL MEDICINE

## 2025-04-02 PROCEDURE — 1036F TOBACCO NON-USER: CPT | Performed by: INTERNAL MEDICINE

## 2025-04-02 PROCEDURE — G8427 DOCREV CUR MEDS BY ELIG CLIN: HCPCS | Performed by: INTERNAL MEDICINE

## 2025-04-02 PROCEDURE — 1123F ACP DISCUSS/DSCN MKR DOCD: CPT | Performed by: INTERNAL MEDICINE

## 2025-04-02 PROCEDURE — 99215 OFFICE O/P EST HI 40 MIN: CPT | Performed by: INTERNAL MEDICINE

## 2025-04-02 PROCEDURE — 93000 ELECTROCARDIOGRAM COMPLETE: CPT | Performed by: INTERNAL MEDICINE

## 2025-04-02 PROCEDURE — G8419 CALC BMI OUT NRM PARAM NOF/U: HCPCS | Performed by: INTERNAL MEDICINE

## 2025-04-02 PROCEDURE — 3079F DIAST BP 80-89 MM HG: CPT | Performed by: INTERNAL MEDICINE

## 2025-04-02 PROCEDURE — 3075F SYST BP GE 130 - 139MM HG: CPT | Performed by: INTERNAL MEDICINE

## 2025-04-02 RX ORDER — OMEPRAZOLE 40 MG/1
40 CAPSULE, DELAYED RELEASE ORAL
Qty: 90 CAPSULE | Refills: 1 | Status: SHIPPED | OUTPATIENT
Start: 2025-04-02

## 2025-04-02 NOTE — PROGRESS NOTES
Aamir ELMA Aponte presents today for No chief complaint on file.    Accompanied by spouse.    \"Have you been to the ER, urgent care clinic since your last visit?  Hospitalized since your last visit?\"    NO    “Have you seen or consulted any other health care providers outside of [facility] since your last visit?”    NO

## 2025-04-02 NOTE — PROGRESS NOTES
64 y.o. male who presents for evaluation.  Wife was present for the encounter    [place] this past weekend when he ate cheese with jalapeno and noted onset of chest pain which lasted a couple hours.  He thought it was indigestion, did not take any antacid.  There was no assoc sob, n/v/sweats, did not radiate.  His wife reports that he's complained of several other similar episodes as this the last 6 mos.  He can't say they're exertional.  No dysphagia, bleeding or other alarm complaints, not taking acid blocker    Past Medical History:   Diagnosis Date    Allergic rhinitis     Benign fundic gland polyps of stomach 09/15/2020    Dr Estrella    Carotid stenosis 06/2021    BICA<50%    COVID-19 vaccine series declined 01/2023    COVID-19 virus infection 01/2021    urgent care    Depression 01/2023    declined therapy or meds    Dyslipidemia     calculated 10 year risk score was 6.7%(2/14); 7.7% (3/15); 7.9% (2/15); 6.3% (3/17); 9.3% (4/18); declined statin repeatedly; declined ca score    H/O cardiovascular stress test     thallium neg (2007)    H/O echocardiogram     nl lv, ef 55% (11/08); ef 55%, mild cLVH, gr 1 dd, mod ao root dilation 4.5cm, mild LAE, neg bubble study (6/21); f/u CT thorax showed ao root 3.4cm (1/24)    Hypertension     IGT (impaired glucose tolerance) 2021    Metabolic dysfunction-associated steatotic liver disease (MASLD) 01/2024    fatty liver on US; serologies neg (1/24); Fib-4 1.78 (7/24); PA Leo (11/24)    LANRE (obstructive sleep apnea)     Dr. Barksdale    Overweight(278.02)     Stroke (cerebrum) ([facility]) 06/01/2021    mri showed acute R basal ganglia infarct; left sided weakness/incoord; Dr Mahoney; saw Dr Ohara; holter neg    Zoster     right T10 (1990s); groin (2023)     Past Surgical History:   Procedure Laterality Date    COLONOSCOPY      Dr Rabago (7/16/10) neg; Dr Estrella (9/15/20) neg     Current Outpatient Medications   Medication Sig    omeprazole (PRILOSEC) 40 MG delayed  Detail Level: Detailed

## 2025-04-24 ENCOUNTER — HOSPITAL ENCOUNTER (OUTPATIENT)
Facility: HOSPITAL | Age: 65
Discharge: HOME | End: 2025-04-27
Attending: INTERNAL MEDICINE
Payer: MEDICARE

## 2025-04-24 ENCOUNTER — HOSPITAL ENCOUNTER (OUTPATIENT)
Facility: HOSPITAL | Age: 65
Discharge: HOME | End: 2025-04-26
Attending: INTERNAL MEDICINE
Payer: MEDICARE

## 2025-04-24 VITALS
HEIGHT: 73 IN | SYSTOLIC BLOOD PRESSURE: 146 MMHG | WEIGHT: 200 LBS | HEART RATE: 63 BPM | BODY MASS INDEX: 26.51 KG/M2 | DIASTOLIC BLOOD PRESSURE: 73 MMHG

## 2025-04-24 DIAGNOSIS — R07.9 CHEST PAIN, UNSPECIFIED TYPE: ICD-10-CM

## 2025-04-24 LAB
ECHO BSA: 2.16 M2
NUC STRESS EJECTION FRACTION: 50 %
STRESS BASELINE DIAS BP: 87 MMHG
STRESS BASELINE HR: 51 BPM
STRESS BASELINE SYS BP: 161 MMHG
STRESS ESTIMATED WORKLOAD: 1 METS
STRESS EXERCISE DUR MIN: 4 MIN
STRESS EXERCISE DUR SEC: 0 SEC
STRESS PEAK DIAS BP: 87 MMHG
STRESS PEAK SYS BP: 161 MMHG
STRESS PERCENT HR ACHIEVED: 52 %
STRESS POST PEAK HR: 81 BPM
STRESS RATE PRESSURE PRODUCT: NORMAL BPM*MMHG
STRESS TARGET HR: 155 BPM
TID: 1.12

## 2025-04-24 PROCEDURE — 6360000002 HC RX W HCPCS: Performed by: INTERNAL MEDICINE

## 2025-04-24 PROCEDURE — 78452 HT MUSCLE IMAGE SPECT MULT: CPT | Performed by: INTERNAL MEDICINE

## 2025-04-24 PROCEDURE — A9502 TC99M TETROFOSMIN: HCPCS | Performed by: INTERNAL MEDICINE

## 2025-04-24 PROCEDURE — 93016 CV STRESS TEST SUPVJ ONLY: CPT | Performed by: INTERNAL MEDICINE

## 2025-04-24 PROCEDURE — 93017 CV STRESS TEST TRACING ONLY: CPT

## 2025-04-24 PROCEDURE — 93018 CV STRESS TEST I&R ONLY: CPT | Performed by: INTERNAL MEDICINE

## 2025-04-24 PROCEDURE — 3430000000 HC RX DIAGNOSTIC RADIOPHARMACEUTICAL: Performed by: INTERNAL MEDICINE

## 2025-04-24 RX ORDER — REGADENOSON 0.08 MG/ML
0.4 INJECTION, SOLUTION INTRAVENOUS
Status: COMPLETED | OUTPATIENT
Start: 2025-04-24 | End: 2025-04-24

## 2025-04-24 RX ADMIN — REGADENOSON 0.4 MG: 0.08 INJECTION, SOLUTION INTRAVENOUS at 08:55

## 2025-04-24 RX ADMIN — TETROFOSMIN 11 MILLICURIE: 1.38 INJECTION, POWDER, LYOPHILIZED, FOR SOLUTION INTRAVENOUS at 06:50

## 2025-04-24 RX ADMIN — TETROFOSMIN 33 MILLICURIE: 1.38 INJECTION, POWDER, LYOPHILIZED, FOR SOLUTION INTRAVENOUS at 08:56

## 2025-04-26 ENCOUNTER — RESULTS FOLLOW-UP (OUTPATIENT)
Facility: CLINIC | Age: 65
End: 2025-04-26

## 2025-04-26 NOTE — TELEPHONE ENCOUNTER
Pls call    Stress test neg  If sx persist even on the acid blocker, will need to call back to get endoscopy scheduled

## 2025-04-29 NOTE — TELEPHONE ENCOUNTER
Called and spoke to patient's spouse and relayed result note from Dr. Lyn; she verbalized understanding.    No further action required.

## 2025-07-09 DIAGNOSIS — Z12.5 SCREENING FOR MALIGNANT NEOPLASM OF PROSTATE: ICD-10-CM

## 2025-07-09 DIAGNOSIS — E78.5 DYSLIPIDEMIA: ICD-10-CM

## 2025-07-09 DIAGNOSIS — I10 PRIMARY HYPERTENSION: ICD-10-CM

## 2025-07-09 DIAGNOSIS — R73.02 IGT (IMPAIRED GLUCOSE TOLERANCE): ICD-10-CM

## 2025-07-16 ENCOUNTER — HOSPITAL ENCOUNTER (OUTPATIENT)
Facility: HOSPITAL | Age: 65
Setting detail: SPECIMEN
Discharge: HOME | End: 2025-07-19

## 2025-07-16 LAB — SENTARA SPECIMEN COLLECTION: NORMAL

## 2025-07-16 PROCEDURE — 99001 SPECIMEN HANDLING PT-LAB: CPT

## 2025-07-17 LAB
A/G RATIO: 1.6 RATIO (ref 1.1–2.6)
ALBUMIN: 4.3 G/DL (ref 3.5–5)
ALP BLD-CCNC: 90 U/L (ref 40–125)
ALT SERPL-CCNC: 47 U/L (ref 5–40)
ANION GAP SERPL CALCULATED.3IONS-SCNC: 17 MMOL/L (ref 3–15)
AST SERPL-CCNC: 45 U/L (ref 10–37)
BASOPHILS # BLD: 2 % (ref 0–2)
BASOPHILS ABSOLUTE: 0.1 K/UL (ref 0–0.2)
BILIRUB SERPL-MCNC: 0.6 MG/DL (ref 0.2–1.2)
BUN BLDV-MCNC: 17 MG/DL (ref 6–22)
CALCIUM SERPL-MCNC: 9.5 MG/DL (ref 8.4–10.5)
CHLORIDE BLD-SCNC: 108 MMOL/L (ref 98–110)
CHOLESTEROL, TOTAL: 145 MG/DL (ref 110–200)
CHOLESTEROL/HDL RATIO: 4.1 (ref 0–5)
CO2: 19 MMOL/L (ref 20–32)
CREAT SERPL-MCNC: 0.9 MG/DL (ref 0.8–1.6)
EOSINOPHIL # BLD: 4 % (ref 0–6)
EOSINOPHILS ABSOLUTE: 0.2 K/UL (ref 0–0.5)
GFR, ESTIMATED: >90 ML/MIN/1.73 SQ.M.
GLOBULIN: 2.7 G/DL (ref 2–4)
GLUCOSE: 94 MG/DL (ref 70–99)
HCT VFR BLD CALC: 43.1 % (ref 37.8–52.2)
HDLC SERPL-MCNC: 35 MG/DL
HEMOGLOBIN: 13.7 G/DL (ref 12.6–17.1)
LDL CHOLESTEROL: 91 MG/DL (ref 50–99)
LDL/HDL RATIO: 2.6
LYMPHOCYTES # BLD: 28 % (ref 20–45)
LYMPHOCYTES ABSOLUTE: 1.5 K/UL (ref 1–4.8)
MCH RBC QN AUTO: 32 PG (ref 26–34)
MCHC RBC AUTO-ENTMCNC: 32 G/DL (ref 31–36)
MCV RBC AUTO: 100 FL (ref 80–95)
MONOCYTES ABSOLUTE: 0.6 K/UL (ref 0.1–1)
MONOCYTES: 11 % (ref 3–12)
NEUTROPHILS ABSOLUTE: 3 K/UL (ref 1.8–7.7)
NEUTROPHILS SEGMENTED: 55 % (ref 40–75)
NON-HDL CHOLESTEROL: 110 MG/DL
PDW BLD-RTO: 13.7 % (ref 10–15.5)
PLATELET # BLD: 267 K/UL (ref 140–440)
PMV BLD AUTO: 11.3 FL (ref 9–13)
POTASSIUM SERPL-SCNC: 4.5 MMOL/L (ref 3.5–5.5)
RBC # BLD: 4.31 M/UL (ref 3.8–5.8)
SCREENING PSA: 0.18 NG/ML
SODIUM BLD-SCNC: 144 MMOL/L (ref 133–145)
TOTAL PROTEIN: 7 G/DL (ref 6.2–8.1)
TRIGL SERPL-MCNC: 95 MG/DL (ref 40–149)
VLDLC SERPL CALC-MCNC: 19 MG/DL (ref 8–30)
WBC # BLD: 5.4 K/UL (ref 4–11)

## 2025-07-25 ENCOUNTER — OFFICE VISIT (OUTPATIENT)
Facility: CLINIC | Age: 65
End: 2025-07-25
Payer: COMMERCIAL

## 2025-07-25 VITALS
DIASTOLIC BLOOD PRESSURE: 84 MMHG | OXYGEN SATURATION: 98 % | BODY MASS INDEX: 27.43 KG/M2 | HEIGHT: 73 IN | TEMPERATURE: 98.1 F | RESPIRATION RATE: 16 BRPM | SYSTOLIC BLOOD PRESSURE: 137 MMHG | HEART RATE: 78 BPM | WEIGHT: 207 LBS

## 2025-07-25 DIAGNOSIS — E78.5 DYSLIPIDEMIA: ICD-10-CM

## 2025-07-25 DIAGNOSIS — I65.23 BILATERAL CAROTID ARTERY STENOSIS: ICD-10-CM

## 2025-07-25 DIAGNOSIS — Z86.73 HISTORY OF COMPLETED STROKE: ICD-10-CM

## 2025-07-25 DIAGNOSIS — K76.0 METABOLIC DYSFUNCTION-ASSOCIATED STEATOTIC LIVER DISEASE (MASLD): ICD-10-CM

## 2025-07-25 DIAGNOSIS — I10 PRIMARY HYPERTENSION: ICD-10-CM

## 2025-07-25 DIAGNOSIS — G47.30 SLEEP APNEA, UNSPECIFIED TYPE: ICD-10-CM

## 2025-07-25 DIAGNOSIS — Z00.00 PHYSICAL EXAM: Primary | ICD-10-CM

## 2025-07-25 DIAGNOSIS — R73.02 IGT (IMPAIRED GLUCOSE TOLERANCE): ICD-10-CM

## 2025-07-25 PROCEDURE — 3075F SYST BP GE 130 - 139MM HG: CPT | Performed by: INTERNAL MEDICINE

## 2025-07-25 PROCEDURE — 99397 PER PM REEVAL EST PAT 65+ YR: CPT | Performed by: INTERNAL MEDICINE

## 2025-07-25 PROCEDURE — 1159F MED LIST DOCD IN RCRD: CPT | Performed by: INTERNAL MEDICINE

## 2025-07-25 PROCEDURE — 3079F DIAST BP 80-89 MM HG: CPT | Performed by: INTERNAL MEDICINE

## 2025-07-25 PROCEDURE — 1126F AMNT PAIN NOTED NONE PRSNT: CPT | Performed by: INTERNAL MEDICINE

## 2025-07-25 NOTE — PROGRESS NOTES
65 y.o. male who presents for RPE    He is in the process of retiring apparently.  Also, they are selling the house and moving to a rental unit owned by the son in [place] in the upcoming months.  No cp, sob, pnd, edema.  He walks the dog or the grandbabies, maybe 10 min a day.  No other exercise    He has not followed up with Dr Ohara but no new neurologic issues.    Denied Gi or  complaints.  The cp went away w acid blocker and avoidance measures, had neg NST as below 4/25    The sleep apnea is controlled and energy level is good    Denies polyuria, polydipsia, nocturia, vision change.  Not checking sugars at this time. Weight stable     7/8/2024 1/9/2025 4/2/2025 4/24/2025 7/25/2025   Vitals        Weight - Scale 200 lb  203 lb  208 lb  200 lb  207 lb      LAST MEDICARE WELLNESS EXAM: never as not medicare b    Past Medical History:   Diagnosis Date    Allergic rhinitis     Benign fundic gland polyps of stomach 09/15/2020    Dr Estrella    Carotid stenosis 06/2021    BICA<50%    COVID-19 vaccine series declined 01/2023    COVID-19 virus infection 01/2021    urgent care    Depression 01/2023    declined therapy or meds    Dyslipidemia     calculated 10 year risk score was 6.7%(2/14); 7.7% (3/15); 7.9% (2/15); 6.3% (3/17); 9.3% (4/18)    H/O cardiovascular stress test     thallium neg (2007); NST low risk, ef 50%, tid 1.12  (4/25)    H/O echocardiogram     nl lv, ef 55% (11/08); ef 55%, mild cLVH, gr 1 dd, mod ao root dilation 4.5cm, mild LAE, neg bubble study (6/21); f/u CT thorax showed ao root 3.4cm (1/24)    Hypertension     IGT (impaired glucose tolerance) 2021    Metabolic dysfunction-associated steatotic liver disease (MASLD) 01/2024    fatty liver on US; serologies neg (1/24); Fib-4 1.78 (7/24), 1.62 (7/25) ; RADHA Bailey (11/24)    LANRE (obstructive sleep apnea)     Dr. Barksdale    Overweight(278.02)     Stroke (cerebrum) ([facility]) 06/01/2021    mri showed acute R basal ganglia infarct; left sided

## 2025-07-25 NOTE — PROGRESS NOTES
Aamir Aponte presents today for   Chief Complaint   Patient presents with    6 Month Follow-Up    Hypertension       \"Have you been to the ER, urgent care clinic since your last visit?  Hospitalized since your last visit?\"    NO    “Have you seen or consulted any other health care providers outside of [facility] since your last visit?”    NO